# Patient Record
Sex: FEMALE | Race: BLACK OR AFRICAN AMERICAN | NOT HISPANIC OR LATINO | Employment: FULL TIME | URBAN - METROPOLITAN AREA
[De-identification: names, ages, dates, MRNs, and addresses within clinical notes are randomized per-mention and may not be internally consistent; named-entity substitution may affect disease eponyms.]

---

## 2017-04-13 ENCOUNTER — TELEPHONE (OUTPATIENT)
Dept: INTERNAL MEDICINE | Facility: CLINIC | Age: 52
End: 2017-04-13

## 2017-04-21 DIAGNOSIS — Z12.31 OTHER SCREENING MAMMOGRAM: ICD-10-CM

## 2017-06-08 ENCOUNTER — OFFICE VISIT (OUTPATIENT)
Dept: INTERNAL MEDICINE | Facility: CLINIC | Age: 52
End: 2017-06-08
Payer: COMMERCIAL

## 2017-06-08 VITALS
RESPIRATION RATE: 16 BRPM | SYSTOLIC BLOOD PRESSURE: 126 MMHG | HEIGHT: 57 IN | WEIGHT: 151.25 LBS | TEMPERATURE: 98 F | DIASTOLIC BLOOD PRESSURE: 82 MMHG | BODY MASS INDEX: 32.63 KG/M2 | HEART RATE: 83 BPM

## 2017-06-08 DIAGNOSIS — N39.0 URINARY TRACT INFECTION WITHOUT HEMATURIA, SITE UNSPECIFIED: Primary | ICD-10-CM

## 2017-06-08 DIAGNOSIS — R42 DIZZINESS: ICD-10-CM

## 2017-06-08 LAB
BACTERIA #/AREA URNS AUTO: ABNORMAL /HPF
BILIRUB UR QL STRIP: NEGATIVE
CLARITY UR REFRACT.AUTO: ABNORMAL
COLOR UR AUTO: YELLOW
GLUCOSE UR QL STRIP: NEGATIVE
HGB UR QL STRIP: ABNORMAL
HYALINE CASTS UR QL AUTO: 0 /LPF
KETONES UR QL STRIP: NEGATIVE
LEUKOCYTE ESTERASE UR QL STRIP: ABNORMAL
MICROSCOPIC COMMENT: ABNORMAL
NITRITE UR QL STRIP: NEGATIVE
NON-SQ EPI CELLS #/AREA URNS AUTO: 5 /HPF
PH UR STRIP: 7 [PH] (ref 5–8)
PROT UR QL STRIP: ABNORMAL
RBC #/AREA URNS AUTO: 56 /HPF (ref 0–4)
SP GR UR STRIP: 1.02 (ref 1–1.03)
SQUAMOUS #/AREA URNS AUTO: 6 /HPF
URN SPEC COLLECT METH UR: ABNORMAL
UROBILINOGEN UR STRIP-ACNC: NEGATIVE EU/DL
WBC #/AREA URNS AUTO: >100 /HPF (ref 0–5)
WBC CLUMPS UR QL AUTO: ABNORMAL

## 2017-06-08 PROCEDURE — 87186 SC STD MICRODIL/AGAR DIL: CPT

## 2017-06-08 PROCEDURE — 87088 URINE BACTERIA CULTURE: CPT

## 2017-06-08 PROCEDURE — 99214 OFFICE O/P EST MOD 30 MIN: CPT | Mod: S$GLB,,, | Performed by: INTERNAL MEDICINE

## 2017-06-08 PROCEDURE — 99999 PR PBB SHADOW E&M-EST. PATIENT-LVL III: CPT | Mod: PBBFAC,,, | Performed by: INTERNAL MEDICINE

## 2017-06-08 PROCEDURE — 87086 URINE CULTURE/COLONY COUNT: CPT

## 2017-06-08 PROCEDURE — 81001 URINALYSIS AUTO W/SCOPE: CPT

## 2017-06-08 PROCEDURE — 87077 CULTURE AEROBIC IDENTIFY: CPT

## 2017-06-08 RX ORDER — MECLIZINE HYDROCHLORIDE 25 MG/1
25 TABLET ORAL 3 TIMES DAILY PRN
Qty: 30 TABLET | Refills: 0 | Status: SHIPPED | OUTPATIENT
Start: 2017-06-08 | End: 2020-08-06

## 2017-06-08 RX ORDER — FLUCONAZOLE 150 MG/1
150 TABLET ORAL ONCE
Qty: 2 TABLET | Refills: 0 | Status: SHIPPED | OUTPATIENT
Start: 2017-06-08 | End: 2017-06-08

## 2017-06-08 RX ORDER — PHENAZOPYRIDINE HYDROCHLORIDE 200 MG/1
200 TABLET, FILM COATED ORAL 3 TIMES DAILY PRN
Qty: 9 TABLET | Refills: 0 | Status: SHIPPED | OUTPATIENT
Start: 2017-06-08 | End: 2017-06-11

## 2017-06-08 RX ORDER — CIPROFLOXACIN 500 MG/1
500 TABLET ORAL 2 TIMES DAILY
Qty: 6 TABLET | Refills: 0 | Status: SHIPPED | OUTPATIENT
Start: 2017-06-08 | End: 2017-06-11

## 2017-06-08 NOTE — PROGRESS NOTES
Subjective:       Patient ID: Joanne Stallings is a 51 y.o. female.    Chief Complaint: Urinary Tract Infection (possible)    HPI     51-year-old female here for evaluation of possible UTI.  This started 2 days ago.  She reports that she has frequent urination and discomfort at the end of urination.  She has had UTIs in the past.  No fevers or chills.  She has not taken azo as of yet.    She gets dizzy when she changes position.  This is intermittent.    Review of Systems    Objective:      Physical Exam   Constitutional: She is oriented to person, place, and time. She appears well-developed and well-nourished.   HENT:   Head: Normocephalic and atraumatic.   Mouth/Throat: No oropharyngeal exudate.   Eyes: EOM are normal. Pupils are equal, round, and reactive to light. Right eye exhibits no discharge. Left eye exhibits no discharge. No scleral icterus.   Neck: Normal range of motion. Neck supple. No tracheal deviation present. No thyromegaly present.   Cardiovascular: Normal rate, regular rhythm and normal heart sounds.  Exam reveals no gallop and no friction rub.    No murmur heard.  Pulmonary/Chest: Effort normal and breath sounds normal. No respiratory distress. She has no wheezes. She has no rales. She exhibits no tenderness.   Abdominal: Soft. Bowel sounds are normal. She exhibits no distension and no mass. There is no tenderness. There is no rebound and no guarding.   Musculoskeletal: Normal range of motion. She exhibits no edema or tenderness.   Neurological: She is alert and oriented to person, place, and time.   Skin: Skin is warm and dry. No rash noted. No erythema. No pallor.   Psychiatric: She has a normal mood and affect. Her behavior is normal.   Vitals reviewed.      Assessment:       1. Urinary tract infection without hematuria, site unspecified    2. Dizziness        Plan:       1.  Urinalysis, urine culture.  Cipro 500 mg twice a day ×3 days.  Diflucan prescribed in case she needs this.  Pyridium  for symptoms.  2.  Meclizine prescribed as needed for dizziness.  Advised patient that this is likely to be is additional hypertension and to move a little more slowly when changing position.

## 2017-06-09 ENCOUNTER — HOSPITAL ENCOUNTER (OUTPATIENT)
Dept: RADIOLOGY | Facility: HOSPITAL | Age: 52
Discharge: HOME | End: 2017-06-09
Attending: INTERNAL MEDICINE
Payer: COMMERCIAL

## 2017-06-09 DIAGNOSIS — Z12.31 OTHER SCREENING MAMMOGRAM: ICD-10-CM

## 2017-06-09 PROCEDURE — 77067 SCR MAMMO BI INCL CAD: CPT | Mod: 26,,, | Performed by: RADIOLOGY

## 2017-06-09 PROCEDURE — 77067 SCR MAMMO BI INCL CAD: CPT | Mod: TC

## 2017-06-09 PROCEDURE — 77063 BREAST TOMOSYNTHESIS BI: CPT | Mod: 26,,, | Performed by: RADIOLOGY

## 2017-06-11 LAB
BACTERIA UR CULT: NORMAL
BACTERIA UR CULT: NORMAL

## 2017-07-21 DIAGNOSIS — E11.9 TYPE 2 DIABETES MELLITUS WITHOUT COMPLICATION: ICD-10-CM

## 2017-09-28 ENCOUNTER — OFFICE VISIT (OUTPATIENT)
Dept: INTERNAL MEDICINE | Facility: CLINIC | Age: 52
End: 2017-09-28
Payer: COMMERCIAL

## 2017-09-28 VITALS
BODY MASS INDEX: 32.86 KG/M2 | WEIGHT: 152.31 LBS | TEMPERATURE: 99 F | RESPIRATION RATE: 16 BRPM | HEART RATE: 93 BPM | DIASTOLIC BLOOD PRESSURE: 63 MMHG | HEIGHT: 57 IN | SYSTOLIC BLOOD PRESSURE: 116 MMHG

## 2017-09-28 DIAGNOSIS — J02.9 ACUTE PHARYNGITIS, UNSPECIFIED ETIOLOGY: Primary | ICD-10-CM

## 2017-09-28 DIAGNOSIS — J04.0 LARYNGITIS: ICD-10-CM

## 2017-09-28 DIAGNOSIS — H61.20 IMPACTED CERUMEN, UNSPECIFIED LATERALITY: ICD-10-CM

## 2017-09-28 DIAGNOSIS — H92.09 OTALGIA, UNSPECIFIED LATERALITY: ICD-10-CM

## 2017-09-28 LAB — DEPRECATED S PYO AG THROAT QL EIA: NEGATIVE

## 2017-09-28 PROCEDURE — 99999 PR PBB SHADOW E&M-EST. PATIENT-LVL III: CPT | Mod: PBBFAC,,, | Performed by: INTERNAL MEDICINE

## 2017-09-28 PROCEDURE — 99212 OFFICE O/P EST SF 10 MIN: CPT | Mod: 25,S$GLB,, | Performed by: INTERNAL MEDICINE

## 2017-09-28 PROCEDURE — 96372 THER/PROPH/DIAG INJ SC/IM: CPT | Mod: S$GLB,,, | Performed by: INTERNAL MEDICINE

## 2017-09-28 PROCEDURE — 87880 STREP A ASSAY W/OPTIC: CPT | Mod: PO

## 2017-09-28 PROCEDURE — 87081 CULTURE SCREEN ONLY: CPT

## 2017-09-28 PROCEDURE — 87147 CULTURE TYPE IMMUNOLOGIC: CPT

## 2017-09-28 RX ORDER — TRIAMCINOLONE ACETONIDE 40 MG/ML
40 INJECTION, SUSPENSION INTRA-ARTICULAR; INTRAMUSCULAR
Status: COMPLETED | OUTPATIENT
Start: 2017-09-28 | End: 2017-09-28

## 2017-09-28 RX ORDER — AMOXICILLIN AND CLAVULANATE POTASSIUM 875; 125 MG/1; MG/1
1 TABLET, FILM COATED ORAL 2 TIMES DAILY
Qty: 20 TABLET | Refills: 0 | Status: SHIPPED | OUTPATIENT
Start: 2017-09-28 | End: 2017-10-08

## 2017-09-28 RX ADMIN — TRIAMCINOLONE ACETONIDE 40 MG: 40 INJECTION, SUSPENSION INTRA-ARTICULAR; INTRAMUSCULAR at 03:09

## 2017-09-30 LAB — BACTERIA THROAT CULT: NORMAL

## 2017-10-01 ENCOUNTER — PATIENT MESSAGE (OUTPATIENT)
Dept: INTERNAL MEDICINE | Facility: CLINIC | Age: 52
End: 2017-10-01

## 2017-10-10 NOTE — PROGRESS NOTES
Subjective:       Patient ID: Joanne Stallings is a 51 y.o. female.    Chief Complaint: Cough; Sore Throat; Otalgia; and Laryngitis    HPI   The patient presents for evaluation of progressive hoarseness, sore throat and dry cough.  She also notes that her ears are clogged.    The patient has type 2 diabetes mellitus and hyperlipidemia.  Review of Systems   HENT: Positive for ear pain, postnasal drip, sore throat and voice change.    Respiratory: Positive for cough and shortness of breath. Negative for wheezing.        Objective:      Physical Exam   Constitutional: She is oriented to person, place, and time. She appears well-developed and well-nourished. No distress.   HENT:   Head: Normocephalic and atraumatic.   Pharynx is injected but without exudate. Sinuses are tender to palpation.  Impacted cerumen is noted bilaterally.   Eyes: Conjunctivae are normal. Right eye exhibits no discharge. Left eye exhibits no discharge. No scleral icterus.   Neck: Normal range of motion. Neck supple. No JVD present. No thyromegaly present.   Cardiovascular: Normal rate, regular rhythm and normal heart sounds.  Exam reveals no gallop and no friction rub.    No murmur heard.  Pulmonary/Chest: Effort normal and breath sounds normal. No respiratory distress. She has no wheezes. She has no rales.   Lymphadenopathy:     She has no cervical adenopathy.   Neurological: She is alert and oriented to person, place, and time.   Skin: Skin is warm and dry. No rash noted.   Nursing note and vitals reviewed.      Assessment:       1. Acute pharyngitis, unspecified etiology    2. Laryngitis    3. Otalgia, unspecified laterality    4. Impacted cerumen, unspecified laterality        Plan:       Joanne was seen today for cough, sore throat, otalgia and laryngitis.  Kenalog will be administered at this time.  The patient was advised to rest her voice.  A throat swab will be sent for strep testing.  Augmentin will be prescribed.  The patient is to  return to clinic as needed.    Diagnoses and all orders for this visit:    Acute pharyngitis, unspecified etiology  -     Throat Screen, Rapid    Laryngitis    Otalgia, unspecified laterality    Impacted cerumen, unspecified laterality  -     Ambulatory consult to ENT    Other orders  -     triamcinolone acetonide injection 40 mg; Inject 1 mL (40 mg total) into the muscle one time.  -     amoxicillin-clavulanate 875-125mg (AUGMENTIN) 875-125 mg per tablet; Take 1 tablet by mouth 2 (two) times daily.  -     Strep A culture, throat

## 2017-10-11 RX ORDER — PROMETHAZINE HYDROCHLORIDE AND DEXTROMETHORPHAN HYDROBROMIDE 6.25; 15 MG/5ML; MG/5ML
5 SYRUP ORAL EVERY 4 HOURS PRN
Qty: 240 ML | Refills: 0 | Status: SHIPPED | OUTPATIENT
Start: 2017-10-11 | End: 2017-10-21

## 2017-12-29 DIAGNOSIS — E11.9 TYPE 2 DIABETES MELLITUS WITHOUT COMPLICATION: ICD-10-CM

## 2018-01-23 ENCOUNTER — OFFICE VISIT (OUTPATIENT)
Dept: URGENT CARE | Facility: CLINIC | Age: 53
End: 2018-01-23
Payer: COMMERCIAL

## 2018-01-23 VITALS
HEART RATE: 78 BPM | RESPIRATION RATE: 18 BRPM | SYSTOLIC BLOOD PRESSURE: 148 MMHG | TEMPERATURE: 98 F | BODY MASS INDEX: 32.79 KG/M2 | HEIGHT: 57 IN | OXYGEN SATURATION: 99 % | WEIGHT: 152 LBS | DIASTOLIC BLOOD PRESSURE: 95 MMHG

## 2018-01-23 DIAGNOSIS — N39.0 URINARY TRACT INFECTION WITHOUT HEMATURIA, SITE UNSPECIFIED: ICD-10-CM

## 2018-01-23 DIAGNOSIS — R30.0 DYSURIA: Primary | ICD-10-CM

## 2018-01-23 LAB
BILIRUB UR QL STRIP: NEGATIVE
GLUCOSE UR QL STRIP: NEGATIVE
KETONES UR QL STRIP: NEGATIVE
LEUKOCYTE ESTERASE UR QL STRIP: POSITIVE
PH, POC UA: 6 (ref 5–8)
POC BLOOD, URINE: POSITIVE
POC NITRATES, URINE: NEGATIVE
PROT UR QL STRIP: POSITIVE
SP GR UR STRIP: 1.01 (ref 1–1.03)
UROBILINOGEN UR STRIP-ACNC: NORMAL (ref 0.1–1.1)

## 2018-01-23 PROCEDURE — 99214 OFFICE O/P EST MOD 30 MIN: CPT | Mod: 25,S$GLB,, | Performed by: FAMILY MEDICINE

## 2018-01-23 PROCEDURE — 81003 URINALYSIS AUTO W/O SCOPE: CPT | Mod: QW,S$GLB,, | Performed by: FAMILY MEDICINE

## 2018-01-23 RX ORDER — SULFAMETHOXAZOLE AND TRIMETHOPRIM 800; 160 MG/1; MG/1
1 TABLET ORAL 2 TIMES DAILY
Qty: 20 TABLET | Refills: 0 | Status: SHIPPED | OUTPATIENT
Start: 2018-01-23 | End: 2018-02-02

## 2018-01-24 NOTE — PROGRESS NOTES
"Subjective:       Patient ID: Joanne Stallings is a 52 y.o. female.    Vitals:  height is 4' 9" (1.448 m) and weight is 68.9 kg (152 lb). Her tympanic temperature is 97.9 °F (36.6 °C). Her blood pressure is 148/95 (abnormal) and her pulse is 78. Her respiration is 18 and oxygen saturation is 99%.     Chief Complaint: Dysuria    Dysuria    This is a new problem. The current episode started yesterday. The problem occurs every urination. The problem has been unchanged. Quality: tingling. The pain is at a severity of 0/10. The patient is experiencing no pain. There has been no fever. She is sexually active. Associated symptoms include frequency. Pertinent negatives include no chills, hematuria, nausea, urgency or vomiting. She has tried nothing for the symptoms. Her past medical history is significant for diabetes mellitus and recurrent UTIs.     Review of Systems   Constitution: Negative for chills and fever.   Skin: Negative for itching.   Musculoskeletal: Negative for back pain.   Gastrointestinal: Negative for abdominal pain, nausea and vomiting.   Genitourinary: Positive for dysuria and frequency. Negative for genital sores, hematuria, missed menses, non-menstrual bleeding and urgency.       Objective:      Physical Exam   Constitutional: She is oriented to person, place, and time. She appears well-developed and well-nourished. She is cooperative.  Non-toxic appearance. She does not appear ill. No distress.   HENT:   Head: Normocephalic and atraumatic.   Right Ear: Hearing, tympanic membrane, external ear and ear canal normal.   Left Ear: Hearing, tympanic membrane, external ear and ear canal normal.   Nose: Nose normal. No mucosal edema, rhinorrhea or nasal deformity. No epistaxis. Right sinus exhibits no maxillary sinus tenderness and no frontal sinus tenderness. Left sinus exhibits no maxillary sinus tenderness and no frontal sinus tenderness.   Mouth/Throat: Uvula is midline, oropharynx is clear and moist " and mucous membranes are normal. No trismus in the jaw. Normal dentition. No uvula swelling. No posterior oropharyngeal erythema.   Eyes: Conjunctivae and lids are normal. Right eye exhibits no discharge. Left eye exhibits no discharge. No scleral icterus.   Sclera clear bilat   Neck: Trachea normal, normal range of motion, full passive range of motion without pain and phonation normal. Neck supple.   Cardiovascular: Normal rate, regular rhythm, normal heart sounds, intact distal pulses and normal pulses.    Pulmonary/Chest: Effort normal and breath sounds normal. No respiratory distress.   Abdominal: Soft. Normal appearance and bowel sounds are normal. She exhibits no pulsatile midline mass. There is no rebound and no guarding.   Musculoskeletal: Normal range of motion. She exhibits no edema or deformity.   Neurological: She is alert and oriented to person, place, and time. She exhibits normal muscle tone. Coordination normal.   Skin: Skin is warm, dry and intact. She is not diaphoretic. No pallor.   Psychiatric: She has a normal mood and affect. Her speech is normal and behavior is normal. Judgment and thought content normal. Cognition and memory are normal.   Nursing note and vitals reviewed.      Assessment:       1. Dysuria    2. Urinary tract infection without hematuria, site unspecified        Plan:         Dysuria  -     POCT Urinalysis, Dipstick, Automated, W/O Scope    Urinary tract infection without hematuria, site unspecified  -     sulfamethoxazole-trimethoprim 800-160mg (BACTRIM DS) 800-160 mg Tab; Take 1 tablet by mouth 2 (two) times daily.  Dispense: 20 tablet; Refill: 0        Force fluid

## 2018-01-24 NOTE — PATIENT INSTRUCTIONS

## 2018-01-25 ENCOUNTER — TELEPHONE (OUTPATIENT)
Dept: URGENT CARE | Facility: CLINIC | Age: 53
End: 2018-01-25

## 2018-02-22 ENCOUNTER — OFFICE VISIT (OUTPATIENT)
Dept: OPTOMETRY | Facility: CLINIC | Age: 53
End: 2018-02-22
Payer: COMMERCIAL

## 2018-02-22 DIAGNOSIS — H52.13 MYOPIA WITH PRESBYOPIA OF BOTH EYES: ICD-10-CM

## 2018-02-22 DIAGNOSIS — E11.9 TYPE 2 DIABETES MELLITUS WITHOUT RETINOPATHY: Primary | ICD-10-CM

## 2018-02-22 DIAGNOSIS — H52.4 MYOPIA WITH PRESBYOPIA OF BOTH EYES: ICD-10-CM

## 2018-02-22 DIAGNOSIS — H35.411 LATTICE DEGENERATION OF PERIPHERAL RETINA, RIGHT: ICD-10-CM

## 2018-02-22 PROCEDURE — 99999 PR PBB SHADOW E&M-EST. PATIENT-LVL II: CPT | Mod: PBBFAC,,, | Performed by: OPTOMETRIST

## 2018-02-22 PROCEDURE — 92310 CONTACT LENS FITTING OU: CPT | Mod: ,,, | Performed by: OPTOMETRIST

## 2018-02-22 PROCEDURE — 92015 DETERMINE REFRACTIVE STATE: CPT | Mod: S$GLB,,, | Performed by: OPTOMETRIST

## 2018-02-22 PROCEDURE — 92014 COMPRE OPH EXAM EST PT 1/>: CPT | Mod: S$GLB,,, | Performed by: OPTOMETRIST

## 2018-02-22 NOTE — PROGRESS NOTES
HPI     Diabetic eye exam  Still has floaters Ou, no worse  Needs contact lens rx also, happy with current brand    Last edited by Troy Box, OD on 2/22/2018  3:47 PM. (History)            Assessment /Plan     For exam results, see Encounter Report.    Type 2 diabetes mellitus without retinopathy    Lattice degeneration of peripheral retina, right    Myopia with presbyopia of both eyes      1. No diabetic retinopathy, no csme. Return in 1 year for dilated eye exam.  2. Monitor condition. Atrophic hole. Patient to report any changes. RTC 1 year recheck.  3. Spec Rx given and Contact lens Rx released to pt. Daily wear only advised, with education to risks of extended wear.  Discussed lens care, compliance and solutions. RTC yearly contact lens follow up.    Different lens options discussed with patient. RTC 1 year full exam.

## 2018-03-05 ENCOUNTER — LAB VISIT (OUTPATIENT)
Dept: LAB | Facility: HOSPITAL | Age: 53
End: 2018-03-05
Attending: INTERNAL MEDICINE
Payer: COMMERCIAL

## 2018-03-05 ENCOUNTER — PATIENT MESSAGE (OUTPATIENT)
Dept: INTERNAL MEDICINE | Facility: CLINIC | Age: 53
End: 2018-03-05

## 2018-03-05 ENCOUNTER — OFFICE VISIT (OUTPATIENT)
Dept: INTERNAL MEDICINE | Facility: CLINIC | Age: 53
End: 2018-03-05
Payer: COMMERCIAL

## 2018-03-05 VITALS
DIASTOLIC BLOOD PRESSURE: 70 MMHG | HEART RATE: 80 BPM | SYSTOLIC BLOOD PRESSURE: 130 MMHG | TEMPERATURE: 99 F | HEIGHT: 57 IN | WEIGHT: 147.94 LBS | BODY MASS INDEX: 31.91 KG/M2

## 2018-03-05 DIAGNOSIS — Z00.00 ANNUAL PHYSICAL EXAM: ICD-10-CM

## 2018-03-05 DIAGNOSIS — E78.5 HYPERLIPIDEMIA, UNSPECIFIED HYPERLIPIDEMIA TYPE: ICD-10-CM

## 2018-03-05 DIAGNOSIS — Z00.00 ANNUAL PHYSICAL EXAM: Primary | ICD-10-CM

## 2018-03-05 DIAGNOSIS — M75.21 BICIPITAL TENDONITIS OF RIGHT SHOULDER: ICD-10-CM

## 2018-03-05 DIAGNOSIS — E11.9 TYPE 2 DIABETES MELLITUS WITHOUT COMPLICATION, WITHOUT LONG-TERM CURRENT USE OF INSULIN: Chronic | ICD-10-CM

## 2018-03-05 LAB
25(OH)D3+25(OH)D2 SERPL-MCNC: 6 NG/ML
ALBUMIN SERPL BCP-MCNC: 3.9 G/DL
ALP SERPL-CCNC: 87 U/L
ALT SERPL W/O P-5'-P-CCNC: 17 U/L
ANION GAP SERPL CALC-SCNC: 8 MMOL/L
AST SERPL-CCNC: 19 U/L
BASOPHILS # BLD AUTO: 0.03 K/UL
BASOPHILS NFR BLD: 0.6 %
BILIRUB SERPL-MCNC: 0.5 MG/DL
BUN SERPL-MCNC: 12 MG/DL
CALCIUM SERPL-MCNC: 10 MG/DL
CHLORIDE SERPL-SCNC: 104 MMOL/L
CHOLEST SERPL-MCNC: 288 MG/DL
CHOLEST/HDLC SERPL: 5 {RATIO}
CO2 SERPL-SCNC: 28 MMOL/L
CREAT SERPL-MCNC: 0.9 MG/DL
DIFFERENTIAL METHOD: ABNORMAL
EOSINOPHIL # BLD AUTO: 0.1 K/UL
EOSINOPHIL NFR BLD: 2.4 %
ERYTHROCYTE [DISTWIDTH] IN BLOOD BY AUTOMATED COUNT: 14 %
EST. GFR  (AFRICAN AMERICAN): >60 ML/MIN/1.73 M^2
EST. GFR  (NON AFRICAN AMERICAN): >60 ML/MIN/1.73 M^2
ESTIMATED AVG GLUCOSE: 134 MG/DL
GLUCOSE SERPL-MCNC: 126 MG/DL
HBA1C MFR BLD HPLC: 6.3 %
HCT VFR BLD AUTO: 42 %
HDLC SERPL-MCNC: 58 MG/DL
HDLC SERPL: 20.1 %
HGB BLD-MCNC: 13.1 G/DL
IMM GRANULOCYTES # BLD AUTO: 0.01 K/UL
IMM GRANULOCYTES NFR BLD AUTO: 0.2 %
LDLC SERPL CALC-MCNC: 210.8 MG/DL
LYMPHOCYTES # BLD AUTO: 1.6 K/UL
LYMPHOCYTES NFR BLD: 30.7 %
MCH RBC QN AUTO: 27.6 PG
MCHC RBC AUTO-ENTMCNC: 31.2 G/DL
MCV RBC AUTO: 89 FL
MONOCYTES # BLD AUTO: 0.4 K/UL
MONOCYTES NFR BLD: 7.7 %
NEUTROPHILS # BLD AUTO: 3 K/UL
NEUTROPHILS NFR BLD: 58.4 %
NONHDLC SERPL-MCNC: 230 MG/DL
NRBC BLD-RTO: 0 /100 WBC
PLATELET # BLD AUTO: 364 K/UL
PMV BLD AUTO: 10.4 FL
POTASSIUM SERPL-SCNC: 4.3 MMOL/L
PROT SERPL-MCNC: 8.2 G/DL
RBC # BLD AUTO: 4.74 M/UL
SODIUM SERPL-SCNC: 140 MMOL/L
TRIGL SERPL-MCNC: 96 MG/DL
TSH SERPL DL<=0.005 MIU/L-ACNC: 1.19 UIU/ML
WBC # BLD AUTO: 5.05 K/UL

## 2018-03-05 PROCEDURE — 36415 COLL VENOUS BLD VENIPUNCTURE: CPT | Mod: PO

## 2018-03-05 PROCEDURE — 80061 LIPID PANEL: CPT

## 2018-03-05 PROCEDURE — 85025 COMPLETE CBC W/AUTO DIFF WBC: CPT

## 2018-03-05 PROCEDURE — 99999 PR PBB SHADOW E&M-EST. PATIENT-LVL III: CPT | Mod: PBBFAC,,, | Performed by: INTERNAL MEDICINE

## 2018-03-05 PROCEDURE — 80053 COMPREHEN METABOLIC PANEL: CPT

## 2018-03-05 PROCEDURE — 83036 HEMOGLOBIN GLYCOSYLATED A1C: CPT

## 2018-03-05 PROCEDURE — 84443 ASSAY THYROID STIM HORMONE: CPT

## 2018-03-05 PROCEDURE — 99396 PREV VISIT EST AGE 40-64: CPT | Mod: S$GLB,,, | Performed by: INTERNAL MEDICINE

## 2018-03-05 PROCEDURE — 82306 VITAMIN D 25 HYDROXY: CPT

## 2018-03-05 RX ORDER — MELOXICAM 15 MG/1
15 TABLET ORAL DAILY
Qty: 7 TABLET | Refills: 0 | Status: SHIPPED | OUTPATIENT
Start: 2018-03-05 | End: 2018-04-02 | Stop reason: ALTCHOICE

## 2018-03-05 RX ORDER — ERGOCALCIFEROL 1.25 MG/1
50000 CAPSULE ORAL WEEKLY
Qty: 12 CAPSULE | Refills: 0 | Status: SHIPPED | OUTPATIENT
Start: 2018-03-05 | End: 2018-05-25 | Stop reason: SDUPTHER

## 2018-03-05 RX ORDER — ROSUVASTATIN CALCIUM 20 MG/1
20 TABLET, COATED ORAL DAILY
Qty: 30 TABLET | Refills: 6 | Status: SHIPPED | OUTPATIENT
Start: 2018-03-05 | End: 2019-03-15 | Stop reason: SDUPTHER

## 2018-03-05 NOTE — PROGRESS NOTES
Subjective:       Patient ID: Joanne Stallings is a 52 y.o. female.    Chief Complaint: Annual Exam    Patient is a 52 y.o.female who presents today for annual.      Cholesterol: due now  Vaccines: Influenza (yearly); Tetanus (due next year)   Eye exam: up to date  Mammogram: 2017  Gyn exam: hysterectomy; done in ; cervix is the only thing remaining  Colonoscopy: 2016; due in ten years    Sugars: 122 last Friday fasting; hasn't taken metformin at least for one year      Back in  she noticed it. She had her bag on the left arm and she reached for the door in her right arm and had a sharp shooting pain in her right arm. It felt numb. It passed and she was able to open the door.   Now, if she reaches behind her, she gets the pain again. It is still sharp in nature. No pain at rest. The pain is located in the tricep muscle.   Past Medical History:   Diagnosis Date    Chronic constipation     Depression     Diabetes mellitus     Hyperlipidemia      Past Surgical History:   Procedure Laterality Date     SECTION      COLONOSCOPY N/A 2016    Procedure: COLONOSCOPY;  Surgeon: Angel Sawyer MD;  Location: [facility] ([address] [place] [address]);  Service: Endoscopy;  Laterality: N/A;    HYSTERECTOMY      ALLIE, ovarires and cervix  remain (fibroids)     Social History     Social History    Marital status: Single     Spouse name: N/A    Number of children: N/A    Years of education: N/A     Occupational History    Not on file.     Social History Main Topics    Smoking status: Never Smoker    Smokeless tobacco: Never Used    Alcohol use Yes      Comment: occasional use only/ not weekly    Drug use: No    Sexual activity: Yes     Partners: Male     Birth control/ protection: Surgical     Other Topics Concern    Not on file     Social History Narrative    Work:      Family: Patient is  2 children.     Habits: Nonsmoker, occasional etoh    Diet/Exercise:     Other:             Review of patient's  allergies indicates:   Allergen Reactions    Iodine Swelling     Other reaction(s): Hives    Shellfish containing products Swelling     Other reaction(s): Hives     Ms. Stallings had no medications administered during this visit.    Review of Systems   Constitutional: Negative for appetite change, chills, diaphoresis, fatigue and fever.   HENT: Negative for congestion, dental problem, ear discharge, ear pain, hearing loss, postnasal drip, sinus pressure and sore throat.    Eyes: Negative for discharge, redness and itching.   Respiratory: Negative for cough, chest tightness, shortness of breath and wheezing.    Cardiovascular: Negative for chest pain, palpitations and leg swelling.   Gastrointestinal: Negative for abdominal pain, constipation, diarrhea, nausea and vomiting.   Endocrine: Negative for cold intolerance and heat intolerance.   Genitourinary: Negative for difficulty urinating, frequency, hematuria and urgency.   Musculoskeletal: Negative for arthralgias, back pain, gait problem, myalgias and neck pain.   Skin: Negative for color change and rash.   Neurological: Negative for dizziness, syncope and headaches.   Hematological: Negative for adenopathy.   Psychiatric/Behavioral: Negative for behavioral problems and sleep disturbance. The patient is not nervous/anxious.        Objective:      Physical Exam   Constitutional: She is oriented to person, place, and time. She appears well-developed and well-nourished. No distress.   HENT:   Head: Normocephalic and atraumatic.   Right Ear: External ear normal.   Left Ear: External ear normal.   Nose: Nose normal.   Mouth/Throat: Oropharynx is clear and moist. No oropharyngeal exudate.   Eyes: Conjunctivae and EOM are normal. Pupils are equal, round, and reactive to light. Right eye exhibits no discharge. Left eye exhibits no discharge. No scleral icterus.   Neck: Normal range of motion. Neck supple. No JVD present. No thyromegaly present.   Cardiovascular: Normal  rate, regular rhythm, normal heart sounds and intact distal pulses.  Exam reveals no gallop and no friction rub.    No murmur heard.  Pulmonary/Chest: Effort normal and breath sounds normal. No stridor. No respiratory distress. She has no wheezes. She has no rales. She exhibits no tenderness.   Abdominal: Soft. Bowel sounds are normal. She exhibits no distension. There is no tenderness. There is no rebound.   Musculoskeletal: Normal range of motion. She exhibits no edema or tenderness.   Lymphadenopathy:     She has no cervical adenopathy.   Neurological: She is alert and oriented to person, place, and time. No cranial nerve deficit.   Skin: Skin is warm and dry. No rash noted. She is not diaphoretic. No erythema.   Psychiatric: She has a normal mood and affect. Her behavior is normal.   Nursing note and vitals reviewed.      Assessment and Plan:       1. Annual physical exam  - Hemoglobin A1c; Future  - CBC auto differential; Future  - Comprehensive metabolic panel; Future  - Lipid panel; Future  - TSH; Future  - Urinalysis; Future  - Vitamin D; Future  - Microalbumin/creatinine urine ratio; Future    2. Hyperlipidemia, unspecified hyperlipidemia type  - Hemoglobin A1c; Future  - CBC auto differential; Future  - Comprehensive metabolic panel; Future  - Lipid panel; Future  - TSH; Future  - Urinalysis; Future  - Vitamin D; Future  - Microalbumin/creatinine urine ratio; Future    3. Type 2 diabetes mellitus without complication, without long-term current use of insulin  - off meds  - Hemoglobin A1c; Future  - CBC auto differential; Future  - Comprehensive metabolic panel; Future  - Lipid panel; Future  - TSH; Future  - Urinalysis; Future  - Vitamin D; Future  - Microalbumin/creatinine urine ratio; Future    4. Bicipital tendonitis of right shoulder  - trial of mobic; will try medrol if glucose is well controlled          No Follow-up on file.

## 2018-04-02 ENCOUNTER — OFFICE VISIT (OUTPATIENT)
Dept: INTERNAL MEDICINE | Facility: CLINIC | Age: 53
End: 2018-04-02
Payer: COMMERCIAL

## 2018-04-02 VITALS
RESPIRATION RATE: 16 BRPM | BODY MASS INDEX: 32.73 KG/M2 | DIASTOLIC BLOOD PRESSURE: 79 MMHG | SYSTOLIC BLOOD PRESSURE: 121 MMHG | WEIGHT: 151.69 LBS | TEMPERATURE: 99 F | HEIGHT: 57 IN | HEART RATE: 80 BPM

## 2018-04-02 DIAGNOSIS — M54.41 ACUTE BILATERAL LOW BACK PAIN WITH BILATERAL SCIATICA: Primary | ICD-10-CM

## 2018-04-02 DIAGNOSIS — M54.42 ACUTE BILATERAL LOW BACK PAIN WITH BILATERAL SCIATICA: Primary | ICD-10-CM

## 2018-04-02 PROCEDURE — 99213 OFFICE O/P EST LOW 20 MIN: CPT | Mod: S$GLB,,, | Performed by: INTERNAL MEDICINE

## 2018-04-02 PROCEDURE — 99999 PR PBB SHADOW E&M-EST. PATIENT-LVL III: CPT | Mod: PBBFAC,,, | Performed by: INTERNAL MEDICINE

## 2018-04-02 RX ORDER — IBUPROFEN 800 MG/1
800 TABLET ORAL EVERY 8 HOURS PRN
Qty: 90 TABLET | Refills: 1 | Status: SHIPPED | OUTPATIENT
Start: 2018-04-02 | End: 2018-04-12

## 2018-04-02 RX ORDER — OXYCODONE AND ACETAMINOPHEN 5; 325 MG/1; MG/1
1 TABLET ORAL EVERY 6 HOURS PRN
Qty: 28 TABLET | Refills: 0 | Status: SHIPPED | OUTPATIENT
Start: 2018-04-02 | End: 2018-06-25

## 2018-04-10 NOTE — PROGRESS NOTES
Subjective:       Patient ID: Joanne Stallings is a 52 y.o. female.    Chief Complaint: Back Pain    HPI   The patient presents with a 3 day history of lower back pain.  Pain radiates into both lower extremities.  She describes the pain as aching in character.  Pain is exacerbated by movement.  There is no bowel or bladder sphincter dysfunction.  He has been using Aleve to help alleviate the pain.  She feels she could use something stronger to provide better relief.  There is no history of injury.  There is no lower extremity weakness.  Review of Systems   Constitutional: Negative for activity change, chills, fever and unexpected weight change.   Respiratory: Negative for shortness of breath.    Cardiovascular: Negative for chest pain.   Gastrointestinal: Positive for constipation. Negative for abdominal pain.   Genitourinary: Negative for difficulty urinating and pelvic pain.   Musculoskeletal: Positive for back pain.   Skin: Negative for rash.   Neurological: Negative for weakness and numbness.       Objective:      Physical Exam   Constitutional: She is oriented to person, place, and time.   The patient is a well-developed middle-aged female complaining of back pain.   HENT:   Head: Normocephalic and atraumatic.   Cardiovascular: Normal rate, regular rhythm, normal heart sounds and intact distal pulses.    Abdominal: Soft. Bowel sounds are normal. She exhibits no distension. There is no tenderness.   Musculoskeletal:   No lumbar paraspinous muscle tenderness is present on palpation.  Anterior flexion to 80°; good extension is noted.  Equivocal straight leg raising test bilaterally.  Hip range of motion is intact.   Neurological: She is alert and oriented to person, place, and time. She exhibits normal muscle tone. Coordination normal.   Lower extremity strength is symmetrical on testing.   Nursing note and vitals reviewed.      Assessment:       1. Acute bilateral low back pain with bilateral sciatica         Plan:       Joanne was seen today for back pain.  Ibuprofen and Percocet will be ordered for pain.  Local heat may be used to the area while awake.  The patient is to return to clinic in 2-3 weeks for follow-up.    Diagnoses and all orders for this visit:    Acute bilateral low back pain with bilateral sciatica    Other orders  -     ibuprofen (ADVIL,MOTRIN) 800 MG tablet; Take 1 tablet (800 mg total) by mouth every 8 (eight) hours as needed for Pain.  -     oxyCODONE-acetaminophen (PERCOCET) 5-325 mg per tablet; Take 1 tablet by mouth every 6 (six) hours as needed for Pain.

## 2018-05-01 ENCOUNTER — PATIENT MESSAGE (OUTPATIENT)
Dept: INTERNAL MEDICINE | Facility: CLINIC | Age: 53
End: 2018-05-01

## 2018-05-01 DIAGNOSIS — M25.511 ACUTE PAIN OF RIGHT SHOULDER: Primary | ICD-10-CM

## 2018-05-15 ENCOUNTER — OFFICE VISIT (OUTPATIENT)
Dept: ORTHOPEDICS | Facility: CLINIC | Age: 53
End: 2018-05-15
Payer: COMMERCIAL

## 2018-05-15 ENCOUNTER — HOSPITAL ENCOUNTER (OUTPATIENT)
Dept: RADIOLOGY | Facility: HOSPITAL | Age: 53
Discharge: HOME | End: 2018-05-15
Attending: ORTHOPAEDIC SURGERY
Payer: COMMERCIAL

## 2018-05-15 VITALS — WEIGHT: 151 LBS | HEIGHT: 57 IN | BODY MASS INDEX: 32.58 KG/M2

## 2018-05-15 DIAGNOSIS — M75.41 IMPINGEMENT SYNDROME OF RIGHT SHOULDER: Primary | ICD-10-CM

## 2018-05-15 DIAGNOSIS — M75.41 IMPINGEMENT SYNDROME OF RIGHT SHOULDER: ICD-10-CM

## 2018-05-15 PROCEDURE — 99999 PR PBB SHADOW E&M-EST. PATIENT-LVL III: CPT | Mod: PBBFAC,,, | Performed by: ORTHOPAEDIC SURGERY

## 2018-05-15 PROCEDURE — 73030 X-RAY EXAM OF SHOULDER: CPT | Mod: 26,RT,, | Performed by: RADIOLOGY

## 2018-05-15 PROCEDURE — 3008F BODY MASS INDEX DOCD: CPT | Mod: CPTII,S$GLB,, | Performed by: ORTHOPAEDIC SURGERY

## 2018-05-15 PROCEDURE — 99203 OFFICE O/P NEW LOW 30 MIN: CPT | Mod: 25,S$GLB,, | Performed by: ORTHOPAEDIC SURGERY

## 2018-05-15 PROCEDURE — 73030 X-RAY EXAM OF SHOULDER: CPT | Mod: TC,FY,RT

## 2018-05-15 PROCEDURE — 20610 DRAIN/INJ JOINT/BURSA W/O US: CPT | Mod: RT,S$GLB,, | Performed by: ORTHOPAEDIC SURGERY

## 2018-05-15 RX ORDER — TRIAMCINOLONE ACETONIDE 40 MG/ML
40 INJECTION, SUSPENSION INTRA-ARTICULAR; INTRAMUSCULAR
Status: COMPLETED | OUTPATIENT
Start: 2018-05-15 | End: 2018-05-15

## 2018-05-15 RX ADMIN — TRIAMCINOLONE ACETONIDE 40 MG: 40 INJECTION, SUSPENSION INTRA-ARTICULAR; INTRAMUSCULAR at 04:05

## 2018-05-15 NOTE — PROGRESS NOTES
Subjective:      Patient ID: Joanne Stallings is a 52 y.o. female.    Chief Complaint: Pain of the Right Shoulder    HPI    The patient complains of 3 months subdeltoid pain.  No specific injury.  She has stiffness and some distal referred pain.  Denies neck pain and neurologic symptoms.  Oral medication from primary physician did not affect symptoms    Review of Systems   Constitution: Negative for fever and weight loss.   HENT: Negative for congestion.    Eyes: Negative for visual disturbance.   Cardiovascular: Negative for chest pain.   Respiratory: Negative for shortness of breath.    Hematologic/Lymphatic: Negative for bleeding problem. Does not bruise/bleed easily.   Skin: Negative for poor wound healing.   Musculoskeletal: Positive for joint pain.   Gastrointestinal: Negative for abdominal pain.   Genitourinary: Negative for dysuria.   Neurological: Negative for seizures.   Psychiatric/Behavioral: Negative for altered mental status.   Allergic/Immunologic: Negative for persistent infections.         Objective:            Ortho/SPM Exam    Right shoulder    General appearance: Alert, oriented, no acute distress.  C spine exam: no tenderness noted.  Skin: No rashes or lesions on exposed areas.  Atrophy: none noted.  Tenderness to palpation: Acromion.  AROM (deg): abduction-95, flexion-120, rotation- unrestricted, painful rotation- absent.  Rotator cuff: normal, Impingement test- positive.  Cross arm adduction test- negative.  Instability testing: negative.   Distal neuro: normal, normal 5/5 strength in all tested muscle groups.  Pulses: Positive peripheral pulses..                    Assessment:       Encounter Diagnosis   Name Primary?    Impingement syndrome of right shoulder Yes          Plan:       Joanne was seen today for pain.    Diagnoses and all orders for this visit:    Impingement syndrome of right shoulder        I explained my diagnostic impression and the reasoning behind it in detail, using  layman's terms.  Models and/or pictures were used to help in the explanation.    Injection recommended and consent given    Physiotherapy    Radiographic evaluation

## 2018-05-15 NOTE — LETTER
May 15, 2018      Samreen Casey, DO  [address] [place] [address]           [facility]  [address]  [facility] [address]  Phone: [phone]          Patient: Joanne Stallings   MR Number: [id number]   YOB: 1965   Date of Visit: 5/15/2018       Dear Dr. Samreen Casey:    Thank you for referring Joanne Stallings to me for evaluation. Attached you will find relevant portions of my assessment and plan of care.    If you have questions, please do not hesitate to call me. I look forward to following Joanne Stallings along with you.    Sincerely,    Andrew Lorenzana MD    Enclosure  CC:  No Recipients    If you would like to receive this communication electronically, please contact [email or web address] or [phone] to request more information on [facility] access.    For providers and/or their staff who would like to refer a patient to [facility], please contact us through our one-stop-shop provider referral line, Regan Gambino, at [phone].    If you feel you have received this communication in error or would no longer like to receive these types of communications, please e-mail [email or web address]

## 2018-05-15 NOTE — PROCEDURES
Procedures   After verbal informed consent, the right subacromial space was aseptically injected with 40 mg of triamcinolone and 1 cc of 1% plain Xylocaine.  The management of post injection flare with rest, ice and over-the-counter analgesics as explained.

## 2018-05-28 RX ORDER — ERGOCALCIFEROL 1.25 MG/1
50000 CAPSULE ORAL WEEKLY
Qty: 12 CAPSULE | Refills: 0 | Status: SHIPPED | OUTPATIENT
Start: 2018-05-28 | End: 2018-08-14

## 2018-05-30 ENCOUNTER — TELEPHONE (OUTPATIENT)
Dept: ORTHOPEDICS | Facility: CLINIC | Age: 53
End: 2018-05-30

## 2018-05-30 NOTE — TELEPHONE ENCOUNTER
Spoke with patient to reschedule appointment. She was scheduled with PA . He was made aware of date, time, and location. Verbalized understanding.  ----- Message from Analisa Cristobal sent at 5/30/2018  3:22 PM CDT -----  Contact: [phone]/self  Pt requesting to be seen before her Courtney 15, 2018 appointment for right shoulder pain  Please call and advise

## 2018-06-05 ENCOUNTER — OFFICE VISIT (OUTPATIENT)
Dept: ORTHOPEDICS | Facility: CLINIC | Age: 53
End: 2018-06-05
Payer: COMMERCIAL

## 2018-06-05 VITALS — WEIGHT: 151 LBS | BODY MASS INDEX: 32.58 KG/M2 | HEIGHT: 57 IN

## 2018-06-05 DIAGNOSIS — M75.41 IMPINGEMENT SYNDROME OF RIGHT SHOULDER: Primary | ICD-10-CM

## 2018-06-05 PROCEDURE — 3008F BODY MASS INDEX DOCD: CPT | Mod: CPTII,S$GLB,, | Performed by: ORTHOPAEDIC SURGERY

## 2018-06-05 PROCEDURE — 99999 PR PBB SHADOW E&M-EST. PATIENT-LVL III: CPT | Mod: PBBFAC,,, | Performed by: ORTHOPAEDIC SURGERY

## 2018-06-05 PROCEDURE — 99213 OFFICE O/P EST LOW 20 MIN: CPT | Mod: S$GLB,,, | Performed by: ORTHOPAEDIC SURGERY

## 2018-06-05 RX ORDER — DICLOFENAC SODIUM 20 MG/G
40 SOLUTION TOPICAL 2 TIMES DAILY
Qty: 1 BOTTLE | Refills: 1 | Status: SHIPPED | OUTPATIENT
Start: 2018-06-05 | End: 2020-10-13 | Stop reason: CLARIF

## 2018-06-05 NOTE — PROGRESS NOTES
Subjective:      Patient ID: Joanne Stallings is a 52 y.o. female.    Chief Complaint: Follow-up of the Right Shoulder      HPI: Joanne Stallings is here in follow up of right shoulder impingement symptoms. She was seen 3 weeks ago by Dr. Lorenzana and was treated with a corticosteroid injection of the right shoulder and physiotherapy. She is here today and reports symptoms have gotten worse since the injection and she never heard from therapy. She previously had pain with reaching behind her back. Now she has constant achy pain that is moving down her arm and into her neck. She has tried Pensaid for this which helps some.     Past Medical History:   Diagnosis Date    Chronic constipation     Depression     Diabetes mellitus     Hyperlipidemia        Current Outpatient Prescriptions:     fluticasone (FLONASE) 50 mcg/actuation nasal spray, 1 spray by Each Nare route once daily., Disp: 1 Bottle, Rfl: 0    lancets (ONETOUCH DELICA LANCETS) 33 gauge Misc, 100 lancets by Misc.(Non-Drug; Combo Route) route once daily., Disp: 100 each, Rfl: 3    lancets Misc, 1 each by Misc.(Non-Drug; Combo Route) route once daily., Disp: 50 each, Rfl: 11    meclizine (ANTIVERT) 25 mg tablet, Take 1 tablet (25 mg total) by mouth 3 (three) times daily as needed., Disp: 30 tablet, Rfl: 0    ONETOUCH ULTRA TEST Strp, TEST ONCE DAILY., Disp: 50 strip, Rfl: 2    oxyCODONE-acetaminophen (PERCOCET) 5-325 mg per tablet, Take 1 tablet by mouth every 6 (six) hours as needed for Pain., Disp: 28 tablet, Rfl: 0    rosuvastatin (CRESTOR) 20 MG tablet, Take 1 tablet (20 mg total) by mouth once daily., Disp: 30 tablet, Rfl: 6    VITAMIN D2 50,000 unit capsule, TAKE 1 CAPSULE (50,000 UNITS TOTAL) BY MOUTH ONCE A WEEK., Disp: 12 capsule, Rfl: 0    blood-glucose meter (ONE TOUCH ULTRA 2) kit, Use as instructed, Disp: 1 each, Rfl: 0    diclofenac sodium (PENNSAID) 20 mg/gram /actuation(2 %) sopm, Apply 40 mg topically 2 (two) times daily.,  "Disp: 1 Bottle, Rfl: 1  Review of patient's allergies indicates:   Allergen Reactions    Iodine Swelling     Other reaction(s): Hives    Shellfish containing products Swelling     Other reaction(s): Hives       Ht 4' 9" (1.448 m)   Wt 68.5 kg (151 lb)   BMI 32.68 kg/m²     Review of Systems   Constitution: Negative for chills and fever.   Cardiovascular: Negative for chest pain and palpitations.   Respiratory: Negative for shortness of breath and wheezing.    Skin: Negative for poor wound healing and rash.   Musculoskeletal: Positive for joint pain and joint swelling. Negative for arthritis, falls, muscle weakness and stiffness.   Gastrointestinal: Negative for nausea and vomiting.   Neurological: Negative for numbness, paresthesias, seizures and tremors.   Psychiatric/Behavioral: Negative for altered mental status.         Objective:    Ortho Exam     C spine exam: no tenderness noted.   Skin: No rashes or lesions on exposed areas.  Atrophy: none noted.  Tenderness to palpation: Acromion, deltoid, and trapezoid.   AROM (deg): abduction-95, flexion- 120,  painful rotation- present.  Rotator cuff: limited by pain, Impingement test- positive.  Supraspinatus stress test positive  Cross arm adduction test- positive.  Distal neuro: normal,  Pulses: Positive peripheral pulses..    GEN: Well developed, well nourished female. AAOX3. No acute distress.   Breathing unlabored.  Mood and affect normal.           Assessment:     Imaging: "FINDINGS:  The osseous structures appear intact without evidence of a displaced fracture or osseous destructive lesion.  No evidence of dislocation.    Laterally downsloping acromion.  No advanced degenerative change.  No calcifications to suggest tendonitis."    I personally reviewed the radiographs and agree with the above impression.        1. Impingement syndrome of right shoulder          Plan:       Orders Placed This Encounter    Ambulatory Referral to Physical/Occupational Therapy "    diclofenac sodium (PENNSAID) 20 mg/gram /actuation(2 %) sopm    I explained the nature of the problem to the patient. I explained since the injection did not work and symptoms are moving into/around the neck that this condition could possibly be coming from the neck vs the shoulder.  I think she would benefit from physiotherapy which she was previously prescribed but did not attend. I have made another order for this.   She has Ibuprofen 800 mg at home, I would like her to take this for 1 month.   She also requested PENSAID. I think this is reasonable.   Follow-up in about 2 months (around 8/5/2018).

## 2018-06-12 ENCOUNTER — CLINICAL SUPPORT (OUTPATIENT)
Dept: REHABILITATION | Facility: HOSPITAL | Age: 53
End: 2018-06-12
Payer: COMMERCIAL

## 2018-06-12 DIAGNOSIS — R53.1 WEAKNESS: ICD-10-CM

## 2018-06-12 DIAGNOSIS — M79.601 PAIN OF RIGHT UPPER EXTREMITY: ICD-10-CM

## 2018-06-12 PROCEDURE — 97110 THERAPEUTIC EXERCISES: CPT | Mod: PN

## 2018-06-12 PROCEDURE — 97165 OT EVAL LOW COMPLEX 30 MIN: CPT | Mod: PN

## 2018-06-12 NOTE — PLAN OF CARE
TIME RECORD    Date: 2018    Start Time:  300  Stop Time:  400    PROCEDURES:    TIMED  Procedure Min.   TE 10             UNTIMED  Procedure Min.   IE 50         Total Timed Minutes:  10  Total Timed Units:  1  Total Untimed Units:  1  Charges Billed/# of units:  LCE1 TE1    Visit:1 FOTO @ 5    OCCUPATIONAL THERAPY INITIAL EVALUATION & PLAN OF TREATMENT    Patient Name: Joanne Stallings  Physician Name:  Tim Abraham  Primary Diagnosis:  R shoulder impingement  Treatment Diagnosis:  Pain in limb, weakness, stiffness in joint  Onset Date:  2018  Eval Date:  2018  Certification Period:  2018 to 2018  Past Medical History:   Past Medical History:   Diagnosis Date    Chronic constipation     Depression     Diabetes mellitus     Hyperlipidemia    Precautions:  universal  Prior Therapy:  None for shoulder  Signs of Abuse: no  Medications: Joanne Stallings has a current medication list which includes the following prescription(s): blood-glucose meter, diclofenac sodium, fluticasone, lancets, lancets, meclizine, onetouch ultra test, oxycodone-acetaminophen, rosuvastatin, and vitamin d2.  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   Prior Level of Function: Independent  Social History:  Lives with family  Place of Residence (steps/adaptations):  N/A  Functional Deficits Leading to Referral/Nature of Injury:  Pt states she was reaching for door and felt a sharp pain with her laptop bag on her shoulder; increased pain with certain movements since then. Presents to clinic this date with decreased ROM, weakness and pain all of which impact her daily functioning.   Patient Therapy Goals:  To decrease pain and increase functional use RUE  Hand dominance: Right  X-Rays/Tests: No evidence of fracture or dislocation. No MRI    Subjective:  Pain:  During no work: 4/10  While working: 10/10  Sleepin/10  Location  of pain: lateral arm radiating down into arm.     Objective:  Sensation Test: experiences tingling experiences numbness with the certain painful movements; no numbness or tingling at rest    Observation/Inspection:rounded shoulders, forward head    Range of Motion:   Shoulder  Right   Left  Pain/Dysfunction with Movement    AROM PROM MMT AROM PROM MMT    flexion 95 100 3-/5 WNL WNL WNL    extension 25 NT 3-/5 WNL WNL WNL    abduction 85 90 3-/5 WNL WNL WNL    adduction 15 NT NT WNL WNL WNL    Internal rotation unable 25 3-/5 WNL WNL WNL    ER at 90° abd 40 25 NT WNL WNL WNL    ER at 0° abd 40 35 3-/5 WNL WNL WNL      ROM Comments: Pain at end range    Painful Arc: Patient demonstrates no painful arc in shoulder flexion or abduction    Special Tests:  Positive: Int. Rot. and ADD. Impingement, Neer Impingement and Empty can test     Palpation: (for pain)     Positive: Anterior Subacromial Space, Bicipital Groove and Supraspinatus Region    Limitations of Functional Status:   Self Care: requires increase time to don clothes, inability or pain with donning bra and reaching overhead  Work: NA  Leisure: driving, lifting, carrying, reaching    Test: Patient scored 45% on FOTO shoulder survey demonstrating Pt's functional ability with upper extremity.     Treatment included: OT evaluation, the following exercises (HEP) were instructed and Joanne was able to demonstrate them prior to the end of the session. HEP are as follows: see attached in media     Assessment  This 52 y.o. female referred to Outpatient Occupational Therapy with diagnosis of R shoulder impingement presents with limitations as described in problem list. Patient can benefit from Occupational Therapy services for Ultrasound, moist heat, PROM, AAROM, AROM, Theraputic exercises, joint mobs, home exercise program provied with written instructions, ice and strengthening. The following goals were discussed with the patient and she is in agreement with them as to  be addressed in the treatment plan.     History Examination Decision Making Complexity Score   Occupational Profile: Did an expanded chart review        Medical and Therapy History:     Comorbidities that may affect POC include: DM          Low   Performance Deficits   Dominant UE affected    Physical  Decreased function of Right UE, Decreased ROM, Increased pain, Decreased strength, Hypomobility, Muscular atrophy, Inability to perform work/tasks, Difficulty sleeping, Inability to perform leisure activiites and Inability to perform self care tasks    Cognitive  N/A      Psychosocial:    Pt unable to perform the following DM all of which were a part of pt's habits, roles, routines, interpersonal interactions prior to injury    Moderate Foto score:45%    Pt has several treatment options including ASTYM, IASTM, cupping, soft tissue mobs, joint mobs, therex, therapeutic activity, education, endurance training, modalities etc.      Discussed goals and Pt in agreement with goals.    Issued HEP at [facility] and pt able to perform all with minimal verbal and tactile cues provided by OT. Pt able to complete all without c/o and demonstrating good technique    Low Low     Rehab Potential: good    Goals to be met in 4 weeks: (7/12/18)  1) Initiate Hep   2) Pt will increase R shoulder AROM by 10 degrees grossly for improved performance with overhead ADL's  3) Pt will report 6/10 pain in (R)shoulder at worst  4) Pt will demonstrate increased MMT to 4-/5 grossly R shoulder  5) Patient will be able to achieve less than or equal to 25% on FOTO shoulder survey demonstrating overall improved functional ability with upper extremity.     Goals to be met by discharge:  1) Independent with HEP  2) Pt will demonstrate (R) shoulder AROM WNL grossly for [place] with ADL's  3) Pt will demonstrate (R) shoulder MMT WNL grossly for [place] with functional activities  4) Independent and pain free with ADL's and IADL's  5) Patient will be  able to achieve less than or equal to 10% on FOTO shoulder survey demonstrating overall improved functional ability with upper extremity.     Plan  Recommended Treatment Plan (2 times per week for 8 weeks): Therapeutic Exercise, Functional Activities, Patient Education, Home Exercise Program, ADL Training, Ultrasound/Phonophoresis, Electrical Stimulation/TENS/Interferential, Moist Heat/Ice, Sensory/Neuromuscular Reeducation and Manual Therapy  Other Recommendations:  RENA/ASTYM PRN    Therapist's Name: Maris JOHN   Date: 06/12/2018    I CERTIFY THE NEED FOR THESE SERVICES FURNISHED UNDER THIS PLAN OF TREATMENT AND WHILE UNDER MY CARE    Physician's comments: ________________________________________________________________________________________________________________________________________________      Physician's Name: ___________________________________

## 2018-06-18 ENCOUNTER — CLINICAL SUPPORT (OUTPATIENT)
Dept: REHABILITATION | Facility: HOSPITAL | Age: 53
End: 2018-06-18
Payer: COMMERCIAL

## 2018-06-18 DIAGNOSIS — R53.1 WEAKNESS: ICD-10-CM

## 2018-06-18 DIAGNOSIS — M79.601 PAIN OF RIGHT UPPER EXTREMITY: ICD-10-CM

## 2018-06-18 PROCEDURE — 97110 THERAPEUTIC EXERCISES: CPT | Mod: PN

## 2018-06-18 PROCEDURE — 97140 MANUAL THERAPY 1/> REGIONS: CPT | Mod: PN

## 2018-06-18 NOTE — PROGRESS NOTES
TIME RECORD     Date: 06/18/2018     Start Time: 300  Stop Time: 400     PROCEDURES:     TIMED  Procedure Min.   MT 10   TE 10   Sup TE 20 N/C                                                     UNTIMED  Procedure Min.   MHP 10   CP 10      Total Timed Minutes:  20  Total Timed Units:  1  Total Untimed Units:  -  Charges Billed/# of units:  MT1 TE1                       Visit:2 FOTO @ 5     OCCUPATIONAL THERAPY PROGRESS NOTE     Patient Name: Joanne Stallings  Physician Name:  Tim Abraham  Primary Diagnosis:  R shoulder impingement  Treatment Diagnosis:  Pain in limb, weakness, stiffness in joint  Onset Date:  February 2018  Eval Date:  06/12/2018  Certification Period:  06/12/2018 to 8/12/2018  Past Medical History:        Past Medical History:   Diagnosis Date    Chronic constipation      Depression      Diabetes mellitus      Hyperlipidemia     Precautions:  universal  Prior Therapy:  None for shoulder  Signs of Abuse: no  Medications: Joanne Stallings has a current medication list which includes the following prescription(s): blood-glucose meter, diclofenac sodium, fluticasone, lancets, lancets, meclizine, onetouch ultra test, oxycodone-acetaminophen, rosuvastatin, and vitamin d2.  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   Prior Level of Function: Independent  Social History:  Lives with family  Place of Residence (steps/adaptations):  N/A  Functional Deficits Leading to Referral/Nature of Injury:  Pt states she was reaching for door and felt a sharp pain with her laptop bag on her shoulder; increased pain with certain movements since then. Presents to clinic this date with decreased ROM, weakness and pain all of which impact her daily functioning.   Patient Therapy Goals:  To decrease pain and increase functional use RUE  Hand dominance: Right  X-Rays/Tests: No evidence of fracture or dislocation. No MRI     Subjective:  "    Pain: 7 /10  "I tried to do those ex's but they were causing me some pain."    Objective:     Pt with MHP to R shoulder x 10 minutes for pain mgmt and tissue ext. MT x 10 consisting of patient supine for L shoulder lateral telescoping, UT STM, pec lift, subscapularis MFR and stretch, PROM with endrange stretching, GHJ inferior anterior posterior glides grade I-III, gentle shoulder oscillations. Pt participates in OT therex for ROM and strengthening RUE per tx log:  Exercises Date 06/18/2018    Visit #2   PROM (R) Shoulder FLEX/ABD/IR/ER 10x   Supine dowel Flex/chest press 0  2/10   SL ABD 0  2/10   SL ER 0  2/10   Sleeper Stretch 3/30"   pulleys 3'   Wall slides 2/15   Corner pec stretch 3/30"   Tband Lats red  2/10   Tband Rows red  2/10   Tband IR/ER red  2/10         CP x 10 minutes after session  Assessment:     Pt participated well this date. She is very limited by pain and continues to have a high pain report however she was able to complete therex in a pain free ROM and with good technique. She has soft tissue tightness in pecs, biceps and upper traps and subscap however tolerates manual therapy well and with improved ROM noted after.  Pt good candidate for OT. She is still limited by decreased ROM, weakness and pain which impact her daily functioning. She would continue to benefit from skilled OT to address deficits.     Patient Education/Response:     Cont with HEP    Plans and Goals:     Cont with OT POC     Goals to be met in 4 weeks: (7/12/18)  1) Initiate Hep   2) Pt will increase R shoulder AROM by 10 degrees grossly for improved performance with overhead ADL's  3) Pt will report 6/10 pain in (R)shoulder at worst  4) Pt will demonstrate increased MMT to 4-/5 grossly R shoulder  5) Patient will be able to achieve less than or equal to 25% on FOTO shoulder survey demonstrating overall improved functional ability with upper extremity.      Goals to be met by discharge:  1) Independent with HEP  2) Pt " will demonstrate (R) shoulder AROM WNL grossly for [place] with ADL's  3) Pt will demonstrate (R) shoulder MMT WNL grossly for [place] with functional activities  4) Independent and pain free with ADL's and IADL's  5) Patient will be able to achieve less than or equal to 10% on FOTO shoulder survey demonstrating overall improved functional ability with upper extremity.

## 2018-06-25 ENCOUNTER — OFFICE VISIT (OUTPATIENT)
Dept: INTERNAL MEDICINE | Facility: CLINIC | Age: 53
End: 2018-06-25
Payer: COMMERCIAL

## 2018-06-25 ENCOUNTER — CLINICAL SUPPORT (OUTPATIENT)
Dept: REHABILITATION | Facility: HOSPITAL | Age: 53
End: 2018-06-25
Payer: COMMERCIAL

## 2018-06-25 VITALS
TEMPERATURE: 99 F | SYSTOLIC BLOOD PRESSURE: 136 MMHG | HEART RATE: 69 BPM | WEIGHT: 152.75 LBS | HEIGHT: 57 IN | DIASTOLIC BLOOD PRESSURE: 84 MMHG | BODY MASS INDEX: 32.96 KG/M2 | RESPIRATION RATE: 18 BRPM

## 2018-06-25 DIAGNOSIS — M79.601 PAIN OF RIGHT UPPER EXTREMITY: ICD-10-CM

## 2018-06-25 DIAGNOSIS — R53.1 WEAKNESS: ICD-10-CM

## 2018-06-25 DIAGNOSIS — M75.41 IMPINGEMENT SYNDROME OF SHOULDER REGION, RIGHT: Primary | ICD-10-CM

## 2018-06-25 DIAGNOSIS — M79.89 ARM SWELLING: ICD-10-CM

## 2018-06-25 PROCEDURE — 97140 MANUAL THERAPY 1/> REGIONS: CPT | Mod: PN

## 2018-06-25 PROCEDURE — 97110 THERAPEUTIC EXERCISES: CPT | Mod: PN

## 2018-06-25 PROCEDURE — 3008F BODY MASS INDEX DOCD: CPT | Mod: CPTII,S$GLB,, | Performed by: INTERNAL MEDICINE

## 2018-06-25 PROCEDURE — 99999 PR PBB SHADOW E&M-EST. PATIENT-LVL III: CPT | Mod: PBBFAC,,, | Performed by: INTERNAL MEDICINE

## 2018-06-25 PROCEDURE — 99214 OFFICE O/P EST MOD 30 MIN: CPT | Mod: S$GLB,,, | Performed by: INTERNAL MEDICINE

## 2018-06-25 RX ORDER — TRAMADOL HYDROCHLORIDE 50 MG/1
50 TABLET ORAL EVERY 6 HOURS PRN
Qty: 60 TABLET | Refills: 0 | Status: SHIPPED | OUTPATIENT
Start: 2018-06-25 | End: 2018-07-05

## 2018-06-25 NOTE — PROGRESS NOTES
Subjective:       Patient ID: Joanne Stallings is a 52 y.o. female.    Chief Complaint: Shoulder Pain (right) and Edema (right arm and hand)    HPI   Pt with right sided shoulder pain(since Feb)/impingement syndrome is here c/o recurrent pain in that shoulder which can radiate to her neck and with certain movements can radiate down her arm to the fingers. She has had 2 sessions of PT which has improved her ROM some. Pt has continued with Motrin 800 mg BID which helps slightly. Pt is also c/o left arm/hand swelling which has been persistent for the last month.   Review of Systems   Constitutional: Negative for activity change, appetite change, chills, diaphoresis, fatigue, fever and unexpected weight change.   HENT: Negative for postnasal drip, rhinorrhea, sinus pressure, sneezing, sore throat, trouble swallowing and voice change.    Respiratory: Negative for cough, shortness of breath and wheezing.    Cardiovascular: Negative for chest pain, palpitations and leg swelling.   Gastrointestinal: Negative for abdominal pain, blood in stool, constipation, diarrhea, nausea and vomiting.   Genitourinary: Negative for dysuria.   Musculoskeletal: Positive for myalgias. Negative for arthralgias.   Skin: Negative for rash and wound.   Allergic/Immunologic: Negative for environmental allergies and food allergies.   Hematological: Negative for adenopathy. Does not bruise/bleed easily.       Objective:      Physical Exam   Constitutional: She is oriented to person, place, and time. She appears well-developed and well-nourished. No distress.   HENT:   Head: Normocephalic and atraumatic.   Eyes: Conjunctivae and EOM are normal. Pupils are equal, round, and reactive to light. Right eye exhibits no discharge. Left eye exhibits no discharge. No scleral icterus.   Neck: Neck supple. No JVD present.   Cardiovascular: Normal rate, regular rhythm, normal heart sounds and intact distal pulses.    Pulmonary/Chest: Effort normal and breath  sounds normal. No respiratory distress. She has no wheezes. She has no rales.   Musculoskeletal: She exhibits no edema.        Right shoulder: She exhibits tenderness and pain. She exhibits no bony tenderness.   Lymphadenopathy:     She has no cervical adenopathy.   Neurological: She is alert and oriented to person, place, and time.   Skin: Skin is warm and dry. No rash noted. She is not diaphoretic. No pallor.       Assessment:       1. Impingement syndrome of shoulder region, right    2. Arm swelling        Plan:    1. Pt advised to continue with PT       Rx Tramadol 50 mg q6 PRN severe pain and continue NSAIDs   2. Check US for evaluation

## 2018-06-25 NOTE — PROGRESS NOTES
TIME RECORD     Date: 06/25/2018     Start Time: 300  Stop Time: 400     PROCEDURES:     TIMED  Procedure Min.   MT 10   TE 40                                                         UNTIMED  Procedure Min.   MHP 10          Total Timed Minutes:  50  Total Timed Units:  4  Total Untimed Units:  -  Charges Billed/# of units:  MT1 TE3                       Visit:2 FOTO @ 5     OCCUPATIONAL THERAPY PROGRESS NOTE     Patient Name: Joanne Stallings  Physician Name:  Tim Abraham  Primary Diagnosis:  R shoulder impingement  Treatment Diagnosis:  Pain in limb, weakness, stiffness in joint  Onset Date:  February 2018  Eval Date:  06/12/2018  Certification Period:  06/12/2018 to 8/12/2018  Past Medical History:        Past Medical History:   Diagnosis Date    Chronic constipation      Depression      Diabetes mellitus      Hyperlipidemia     Precautions:  universal  Prior Therapy:  None for shoulder  Signs of Abuse: no  Medications: Joanne Stallings has a current medication list which includes the following prescription(s): blood-glucose meter, diclofenac sodium, fluticasone, lancets, lancets, meclizine, onetouch ultra test, oxycodone-acetaminophen, rosuvastatin, and vitamin d2.  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   Prior Level of Function: Independent  Social History:  Lives with family  Place of Residence (steps/adaptations):  N/A  Functional Deficits Leading to Referral/Nature of Injury:  Pt states she was reaching for door and felt a sharp pain with her laptop bag on her shoulder; increased pain with certain movements since then. Presents to clinic this date with decreased ROM, weakness and pain all of which impact her daily functioning.   Patient Therapy Goals:  To decrease pain and increase functional use RUE  Hand dominance: Right  X-Rays/Tests: No evidence of fracture or dislocation. No MRI     Subjective:     Pain:  "4/10  "I went to the doctor today and he gave me some pain meds which help."    Objective:     Pt with MHP to R shoulder x 10 minutes for pain mgmt and tissue ext. MT x 10 consisting of patient supine for L shoulder lateral telescoping, UT STM, pec lift, subscapularis MFR and stretch, PROM with endrange stretching, GHJ inferior anterior posterior glides grade I-III, gentle shoulder oscillations. Pt participates in OT therex for ROM and strengthening RUE per tx log:  Exercises Date 06/25/2018    Visit #3   PROM (R) Shoulder FLEX/ABD/IR/ER 10x   Supine dowel Flex 0  2/15   SL ABD 0  2/15   SL ER 0  2/15   Sleeper Stretch 3/30"   pulleys 3'   Wall slides 2/15   Corner pec stretch 3/30"   Tband Lats red  2/15   Tband Rows red  2/15   Tband IR/ER red  2/15   IR pulley stretch 3/30"     Declined CP this date.   Assessment:     Pt with better participation this date. Her ROM continues to steadily improve. She was able to increase reps without c/o. Her pain report is improved significantly from previous session. She has soft tissue tightness in pecs, biceps and upper traps and subscap however responds well to manual therapy.  Pt progressing well to goals. She is still limited by decreased ROM, weakness and pain which impact her daily functioning. She would continue to benefit from skilled OT to address deficits.     Patient Education/Response:     Cont with HEP    Plans and Goals:     Cont with OT POC     Goals to be met in 4 weeks: (7/12/18)  1) Initiate Hep   2) Pt will increase R shoulder AROM by 10 degrees grossly for improved performance with overhead ADL's  3) Pt will report 6/10 pain in (R)shoulder at worst  4) Pt will demonstrate increased MMT to 4-/5 grossly R shoulder  5) Patient will be able to achieve less than or equal to 25% on FOTO shoulder survey demonstrating overall improved functional ability with upper extremity.      Goals to be met by discharge:  1) Independent with HEP  2) Pt will demonstrate (R) " shoulder AROM WNL grossly for [place] with ADL's  3) Pt will demonstrate (R) shoulder MMT WNL grossly for [place] with functional activities  4) Independent and pain free with ADL's and IADL's  5) Patient will be able to achieve less than or equal to 10% on FOTO shoulder survey demonstrating overall improved functional ability with upper extremity.

## 2018-06-26 ENCOUNTER — CLINICAL SUPPORT (OUTPATIENT)
Dept: CARDIOLOGY | Facility: CLINIC | Age: 53
End: 2018-06-26
Attending: INTERNAL MEDICINE
Payer: COMMERCIAL

## 2018-06-26 DIAGNOSIS — M79.89 ARM SWELLING: ICD-10-CM

## 2018-06-26 PROCEDURE — 93971 EXTREMITY STUDY: CPT | Mod: S$GLB,,, | Performed by: INTERNAL MEDICINE

## 2018-07-09 ENCOUNTER — TELEPHONE (OUTPATIENT)
Dept: REHABILITATION | Facility: HOSPITAL | Age: 53
End: 2018-07-09

## 2018-07-23 ENCOUNTER — CLINICAL SUPPORT (OUTPATIENT)
Dept: REHABILITATION | Facility: HOSPITAL | Age: 53
End: 2018-07-23
Payer: COMMERCIAL

## 2018-07-23 DIAGNOSIS — M79.601 PAIN OF RIGHT UPPER EXTREMITY: ICD-10-CM

## 2018-07-23 DIAGNOSIS — R53.1 WEAKNESS: ICD-10-CM

## 2018-07-23 PROCEDURE — 97110 THERAPEUTIC EXERCISES: CPT | Mod: PN

## 2018-07-23 PROCEDURE — 97140 MANUAL THERAPY 1/> REGIONS: CPT | Mod: PN

## 2018-07-23 NOTE — PROGRESS NOTES
"TIME RECORD     Date: 07/23/2018     Start Time: 300  Stop Time: 350     PROCEDURES:     TIMED  Procedure Min.   MT 10   TE 40                                                         UNTIMED  Procedure Min.              Total Timed Minutes:  50  Total Timed Units:  4  Total Untimed Units:  -  Charges Billed/# of units:  MT1 TE3                       Visit:3 FOTO @ 5     OCCUPATIONAL THERAPY PROGRESS NOTE     Patient Name: Joanne Stallings  Physician Name:  Tim Abraham  Primary Diagnosis:  R shoulder impingement  Treatment Diagnosis:  Pain in limb, weakness, stiffness in joint  Onset Date:  February 2018  Eval Date:  06/12/2018  Certification Period:  06/12/2018 to 8/12/2018  Past Medical History:        Past Medical History:   Diagnosis Date    Chronic constipation      Depression      Diabetes mellitus      Hyperlipidemia     Precautions:  universal  X-Rays/Tests: No evidence of fracture or dislocation. No MRI     Subjective:     Pain: 5/10  "I feel like the motion is improving but the pain is not."    Objective:     Pt on UBE for/rev @120rpms x 6 minutes prior to session. MT x 10 consisting of patient supine for R shoulder lateral telescoping, UT STM, pec lift, subscapularis MFR and stretch, PROM with endrange stretching, GHJ inferior anterior posterior glides grade I-III, gentle shoulder oscillations. Pt participates in OT therex for ROM and strengthening RUE per tx log:  Exercises Date 07/23/2018    Visit #4   PROM (R) Shoulder FLEX/ABD/IR/ER 10x   Supine dowel Flex 2  2/15   SL ABD 1  2/15   SL ER 1  2/15   Sleeper Stretch 3/30"   pulleys 3'   Wall slides 2/15   Corner pec stretch 3/30"   Tband Lats Green  2/15   Tband Rows Green  2/15   Tband IR/ER Green  2/15   IR pulley stretch 3/30"     Range of Motion:   Shoulder   Right     AROM PROM   flexion 135(+40) 135(+35)   extension 35(+10) NT   abduction 100(+15) 100(+10)   adduction 20(+5) NT   Internal rotation unable 50(+25)   ER at 90° abd 70(+30) " 55(+30)   ER at 0° abd 45(+5) 50(+15)      Declined CP this date.   Assessment:     Pt continues to have a high pain report however her ROM has significantly improved from IE. She was able to tolerate progression of treatment well-increasing weight and resistance with therex without c/o. She has soft tissue tightness in pecs, biceps, upper traps and subscap however responds well to manual therapy.  Pt progressing well to goals. She is still limited by decreased ROM, weakness and pain which impact her daily functioning. She would continue to benefit from skilled OT to address deficits.     Patient Education/Response:     Cont with HEP    Plans and Goals:     Cont with OT POC     Goals to be met in 4 weeks: (7/12/18)  1) Initiate Hep   2) Pt will increase R shoulder AROM by 10 degrees grossly for improved performance with overhead ADL's  3) Pt will report 6/10 pain in (R)shoulder at worst  4) Pt will demonstrate increased MMT to 4-/5 grossly R shoulder  5) Patient will be able to achieve less than or equal to 25% on FOTO shoulder survey demonstrating overall improved functional ability with upper extremity.      Goals to be met by discharge:  1) Independent with HEP  2) Pt will demonstrate (R) shoulder AROM WNL grossly for Warren with ADL's  3) Pt will demonstrate (R) shoulder MMT WNL grossly for Warren with functional activities  4) Independent and pain free with ADL's and IADL's  5) Patient will be able to achieve less than or equal to 10% on FOTO shoulder survey demonstrating overall improved functional ability with upper extremity.

## 2018-07-27 ENCOUNTER — OFFICE VISIT (OUTPATIENT)
Dept: ORTHOPEDICS | Facility: CLINIC | Age: 53
End: 2018-07-27
Payer: COMMERCIAL

## 2018-07-27 VITALS — HEIGHT: 57 IN | BODY MASS INDEX: 32.15 KG/M2 | WEIGHT: 149 LBS

## 2018-07-27 DIAGNOSIS — R20.0 NUMBNESS AND TINGLING OF RIGHT ARM: Primary | ICD-10-CM

## 2018-07-27 DIAGNOSIS — R20.2 NUMBNESS AND TINGLING OF RIGHT ARM: Primary | ICD-10-CM

## 2018-07-27 PROCEDURE — 3008F BODY MASS INDEX DOCD: CPT | Mod: CPTII,S$GLB,, | Performed by: ORTHOPAEDIC SURGERY

## 2018-07-27 PROCEDURE — 99999 PR PBB SHADOW E&M-EST. PATIENT-LVL II: CPT | Mod: PBBFAC,,, | Performed by: ORTHOPAEDIC SURGERY

## 2018-07-27 PROCEDURE — 99213 OFFICE O/P EST LOW 20 MIN: CPT | Mod: S$GLB,,, | Performed by: ORTHOPAEDIC SURGERY

## 2018-07-27 NOTE — PROGRESS NOTES
Subjective:      Patient ID: Joanne Stallings is a 52 y.o. female.    Chief Complaint: Pain of the Right Shoulder    HPI  The patient complains of persistent pain and stiffness in the right shoulder.  She has radicular pain in the arm with occasional numbness.  She denies motor symptoms. She feels physical therapy has helped with range of motion but not pain control.  Review of Systems   Constitution: Negative for fever and weight loss.   HENT: Negative for congestion.    Eyes: Negative for visual disturbance.   Cardiovascular: Negative for chest pain.   Respiratory: Negative for shortness of breath.    Hematologic/Lymphatic: Negative for bleeding problem. Does not bruise/bleed easily.   Skin: Negative for poor wound healing.   Gastrointestinal: Negative for abdominal pain.   Genitourinary: Negative for dysuria.   Neurological: Negative for seizures.   Psychiatric/Behavioral: Negative for altered mental status.   Allergic/Immunologic: Negative for persistent infections.         Objective:            Ortho/SPM Exam  Normally developed woman in no acute distress  Supple cervical spine  Right shoulder has normal skin without atrophy or winging.  No focal tenderness.  Range of motion:  Flexion-130° abduction-110° rotation is nearly full  Rotator cuff strength is normal  Impingement testing is really negative  Distal sensory motor exam is intact  Distal pulses are present  Phalen's test is negative          Assessment:       Encounter Diagnosis   Name Primary?    Numbness and tingling of right arm Yes     Mixed picture of frozen shoulder and neurologic impingement      Plan:       Joanne was seen today for pain.    Diagnoses and all orders for this visit:    Numbness and tingling of right arm        Continue therapy for shoulder  EMG to rule out nerve impingement syndromes  Follow up with Dr. Abraham to evaluate possible need for surgery

## 2018-07-31 ENCOUNTER — TELEPHONE (OUTPATIENT)
Dept: REHABILITATION | Facility: HOSPITAL | Age: 53
End: 2018-07-31

## 2018-07-31 DIAGNOSIS — M79.601 PAIN OF RIGHT UPPER EXTREMITY: ICD-10-CM

## 2018-07-31 DIAGNOSIS — R53.1 WEAKNESS: ICD-10-CM

## 2018-08-30 ENCOUNTER — PATIENT MESSAGE (OUTPATIENT)
Dept: ORTHOPEDICS | Facility: CLINIC | Age: 53
End: 2018-08-30

## 2018-09-04 ENCOUNTER — OFFICE VISIT (OUTPATIENT)
Dept: ORTHOPEDICS | Facility: CLINIC | Age: 53
End: 2018-09-04
Payer: COMMERCIAL

## 2018-09-04 VITALS — BODY MASS INDEX: 32.15 KG/M2 | HEIGHT: 57 IN | WEIGHT: 149 LBS

## 2018-09-04 DIAGNOSIS — M75.41 SHOULDER IMPINGEMENT SYNDROME, RIGHT: Primary | ICD-10-CM

## 2018-09-04 PROCEDURE — 99999 PR PBB SHADOW E&M-EST. PATIENT-LVL III: CPT | Mod: PBBFAC,,, | Performed by: ORTHOPAEDIC SURGERY

## 2018-09-04 PROCEDURE — 99214 OFFICE O/P EST MOD 30 MIN: CPT | Mod: S$GLB,,, | Performed by: ORTHOPAEDIC SURGERY

## 2018-09-04 PROCEDURE — 3008F BODY MASS INDEX DOCD: CPT | Mod: CPTII,S$GLB,, | Performed by: ORTHOPAEDIC SURGERY

## 2018-09-04 NOTE — PROGRESS NOTES
Subjective:      Patient ID: Joanne Stallings is a 52 y.o. female.    Chief Complaint: Pain of the Right Shoulder      HPI: Joanne Stallings  Is here in follow-up of right shoulder pain and numbness/tingling of the right arm. Patient reports having symptoms since February of 2018.  Symptoms originally started as numbness and tingling of the arm  and then progressed to decreased range of motion of the shoulder. She was previously seen and treated  in May with a corticosteroid injection of the right shoulder and physical therapy.  The patient reports her symptoms actually worsened after the injection however she  does report increased range of motion after physical therapy.  At her last visit an EMG was ordered but the patient did not have the test done. She continues to have daily numbness and tingling starting  In her upper arm and radiating to the dorsal hand.  The pain begins at the  Right-sided nape of the neck,  into the medial border of the scapula,  and biceps.    Past Medical History:   Diagnosis Date    Chronic constipation     Depression     Diabetes mellitus     Hyperlipidemia        Current Outpatient Medications:     fluticasone (FLONASE) 50 mcg/actuation nasal spray, 1 spray by Each Nare route once daily., Disp: 1 Bottle, Rfl: 0    lancets (ONETOUCH DELICA LANCETS) 33 gauge Misc, 100 lancets by Misc.(Non-Drug; Combo Route) route once daily., Disp: 100 each, Rfl: 3    lancets Misc, 1 each by Misc.(Non-Drug; Combo Route) route once daily., Disp: 50 each, Rfl: 11    ONETOUCH ULTRA TEST Strp, TEST ONCE DAILY., Disp: 50 strip, Rfl: 2    rosuvastatin (CRESTOR) 20 MG tablet, Take 1 tablet (20 mg total) by mouth once daily., Disp: 30 tablet, Rfl: 6    blood-glucose meter (ONE TOUCH ULTRA 2) kit, Use as instructed, Disp: 1 each, Rfl: 0    diclofenac sodium (PENNSAID) 20 mg/gram /actuation(2 %) sopm, Apply 40 mg topically 2 (two) times daily., Disp: 1 Bottle, Rfl: 1    meclizine (ANTIVERT) 25 mg  "tablet, Take 1 tablet (25 mg total) by mouth 3 (three) times daily as needed., Disp: 30 tablet, Rfl: 0  Review of patient's allergies indicates:   Allergen Reactions    Iodine Swelling     Other reaction(s): Hives    Shellfish containing products Swelling     Other reaction(s): Hives       Ht 4' 9" (1.448 m)   Wt 67.6 kg (149 lb)   LMP  (LMP Unknown)   BMI 32.24 kg/m²     Review of Systems   Constitution: Negative for chills and fever.   Cardiovascular: Negative for chest pain and palpitations.   Respiratory: Negative for shortness of breath and wheezing.    Skin: Negative for poor wound healing and rash.   Musculoskeletal: Positive for joint pain, myalgias and stiffness.   Gastrointestinal: Negative for nausea and vomiting.   Genitourinary: Negative for dysuria and hematuria.   Neurological: Positive for numbness and paresthesias. Negative for seizures and tremors.   Psychiatric/Behavioral: Negative for altered mental status.   Allergic/Immunologic: Negative for environmental allergies and persistent infections.         Objective:    Ortho Exam       GEN: Well developed, well nourished  female. AAOX3. No acute distress.   Normocephalic, atraumatic.   MAUREEN  Breathing unlabored.  Mood and affect  appropriate.     C spine exam: no tenderness noted.     Right shoulder:  Skin: No rashes or lesions on exposed areas.  Atrophy: none noted.  Tenderness to palpation: None.  AROM (deg): abduction- 120, flexion- 150, rotation-  Slightly restricted secondary to pain. painful rotation- present.  Rotator cuff: limited by pain, Impingement test- positive.  Cross arm adduction test- negative.  Instability testing:  Not performed.   Distal neuro:  No decrease in sensation today however patient reports decrease in sensation from time to time, no muscle wasting or atrophy.  Pulses: Positive peripheral pulses.    Assessment:     Imaging:   No new imaging.      1. Shoulder impingement syndrome, right      Vs frozen shoulder vs  " Neurologic impingement.      Plan:        I explained to the patient  her symptoms have a mixed picture and it is difficult to decipher exactly where her pain originates.    The majority of her symptoms sound a neurologic in nature however her exam  Is more consistent with frozen shoulder/ impingement.   I recommended repeat injection today  which the patient declined.   I have provided the patient with a number to have her EMG scheduled and completed.   Given the length of her symptoms and decreased range of motion think it is reasonable to also get an MRI of the shoulder for further evaluation.   Continue topical Pennsaid.   Continue 800 mg ibuprofen.   activity modification discussed.    Orders Placed This Encounter    MRI Shoulder Without Contrast Right     Follow-up for  EMG and MRI results.    patient make appointment on [facility].

## 2018-09-10 ENCOUNTER — HOSPITAL ENCOUNTER (OUTPATIENT)
Dept: RADIOLOGY | Facility: HOSPITAL | Age: 53
Discharge: HOME | End: 2018-09-10
Attending: ORTHOPAEDIC SURGERY
Payer: COMMERCIAL

## 2018-09-10 DIAGNOSIS — M75.41 SHOULDER IMPINGEMENT SYNDROME, RIGHT: ICD-10-CM

## 2018-09-10 PROCEDURE — 73221 MRI JOINT UPR EXTREM W/O DYE: CPT | Mod: 26,RT,, | Performed by: RADIOLOGY

## 2018-09-10 PROCEDURE — 73221 MRI JOINT UPR EXTREM W/O DYE: CPT | Mod: TC,RT

## 2018-09-17 ENCOUNTER — PATIENT MESSAGE (OUTPATIENT)
Dept: ORTHOPEDICS | Facility: CLINIC | Age: 53
End: 2018-09-17

## 2018-09-17 ENCOUNTER — TELEPHONE (OUTPATIENT)
Dept: ORTHOPEDICS | Facility: CLINIC | Age: 53
End: 2018-09-17

## 2018-09-17 DIAGNOSIS — M54.12 CERVICAL RADICULOPATHY: Primary | ICD-10-CM

## 2018-09-17 RX ORDER — GABAPENTIN 100 MG/1
100 CAPSULE ORAL 3 TIMES DAILY
Qty: 90 CAPSULE | Refills: 11 | Status: SHIPPED | OUTPATIENT
Start: 2018-09-17 | End: 2019-03-24

## 2018-09-17 NOTE — TELEPHONE ENCOUNTER
Spoke with the patient today about her right shoulder MRI results.  Explained to the patient that she does not have rotator cuff tear that requires surgery and that her symptoms are likely coming from the neck as previously discussed.     Her EMG is not scheduled until November.    I recommend trial of gabapentin in the meantime.  I have placed this order today.

## 2018-09-28 DIAGNOSIS — E11.9 TYPE 2 DIABETES MELLITUS WITHOUT COMPLICATION: ICD-10-CM

## 2018-10-08 ENCOUNTER — OFFICE VISIT (OUTPATIENT)
Dept: OBSTETRICS AND GYNECOLOGY | Facility: CLINIC | Age: 53
End: 2018-10-08
Payer: COMMERCIAL

## 2018-10-08 VITALS
HEIGHT: 57 IN | DIASTOLIC BLOOD PRESSURE: 80 MMHG | BODY MASS INDEX: 32.08 KG/M2 | WEIGHT: 148.69 LBS | SYSTOLIC BLOOD PRESSURE: 132 MMHG

## 2018-10-08 DIAGNOSIS — N89.8 VAGINAL DRYNESS: ICD-10-CM

## 2018-10-08 DIAGNOSIS — N76.1 SUBACUTE VAGINITIS: Primary | ICD-10-CM

## 2018-10-08 LAB
CANDIDA RRNA VAG QL PROBE: NEGATIVE
G VAGINALIS RRNA GENITAL QL PROBE: POSITIVE
T VAGINALIS RRNA GENITAL QL PROBE: NEGATIVE

## 2018-10-08 PROCEDURE — 99999 PR PBB SHADOW E&M-EST. PATIENT-LVL III: CPT | Mod: PBBFAC,,, | Performed by: NURSE PRACTITIONER

## 2018-10-08 PROCEDURE — 87660 TRICHOMONAS VAGIN DIR PROBE: CPT

## 2018-10-08 PROCEDURE — 99213 OFFICE O/P EST LOW 20 MIN: CPT | Mod: S$GLB,,, | Performed by: NURSE PRACTITIONER

## 2018-10-08 PROCEDURE — 3008F BODY MASS INDEX DOCD: CPT | Mod: CPTII,S$GLB,, | Performed by: NURSE PRACTITIONER

## 2018-10-08 RX ORDER — METRONIDAZOLE 7.5 MG/G
1 GEL VAGINAL DAILY
Qty: 70 G | Refills: 0 | Status: SHIPPED | OUTPATIENT
Start: 2018-10-08 | End: 2018-10-13

## 2018-10-08 RX ORDER — ESTRADIOL 0.1 MG/G
1 CREAM VAGINAL
Qty: 42.5 G | Refills: 3 | Status: SHIPPED | OUTPATIENT
Start: 2018-10-08 | End: 2019-11-26

## 2018-10-08 NOTE — PROGRESS NOTES
CC: Vaginal Irritation   Joanne Stallings is a 52 y.o. female  presents with complaint of vaginal irritation and itching  for 2 weeks.  reports odor.  She states the discharge is clear.  Alleviating factors: none. No new sexual partners. Pt reports recurrent vaginal irritation after intercourse.  Partner is not circumcised. She is s/p hysterectomy- ovaries and cervix remain. Pt also reports vaginal dryness- worse with intercourse.  She is using KY water based jelly prior to intercourse with minimal effects.       ROS:  GENERAL: No fever, chills, fatigability or weight loss.  VULVAR: No pain, no lesions and no itching.  VAGINAL: No relaxation, + itching, +  discharge, no abnormal bleeding and no lesions.  ABDOMEN: No abdominal pain. Denies nausea. Denies vomiting. No diarrhea. No constipation  BREAST: Denies pain. No lumps. No discharge.  URINARY: No incontinence, no nocturia, no frequency and no dysuria.  CARDIOVASCULAR: No chest pain. No shortness of breath. No leg cramps.  NEUROLOGICAL: No headaches. No vision changes.    PHYSICAL EXAM:  VULVA: normal appearing vulva with no masses, tenderness or lesions   VAGINA: normal appearing vagina with normal color and + thin discharge, + odor no lesions   CERVIX: surgically absent  UTERUS: uterus is normal size, shape, consistency and nontender   ADNEXA: normal adnexa in size, nontender and no masses    ASSESSMENT and PLAN:    ICD-10-CM ICD-9-CM    1. Subacute vaginitis N76.1 616.10 metroNIDAZOLE (METROGEL VAGINAL) 0.75 % vaginal gel      Vaginosis Screen by DNA Probe      boric acid (BORIC ACID) vaginal suppository   2. Vaginal dryness N89.8 625.8 estradiol (ESTRACE) 0.01 % (0.1 mg/gram) vaginal cream       Affirm  Metrogel  Boric Acid suppositories PRN after intercourse  Discussed vaginal atrophy - vaginal estrace cream bi weekly     Patient was counseled today on vaginitis prevention including :  a. avoiding feminine products such as deoderant soaps, body  wash, bubble bath, douches, scented toilet paper, deoderant tampons or pads, feminine wipes, chronic pad use, etc.  b. avoiding other vulvovaginal irritants such as long hot baths, humidity, tight, synthetic clothing, chlorine and sitting around in wet bathing suits  c. wearing cotton underwear, avoiding thong underwear and no underwear to bed  d. taking showers instead of baths and use a hair dryer on cool setting afterwards to dry  e. wearing cotton to exercise and shower immediately after exercise and change clothes  f. using polyurethane condoms without spermicide if sexually active and symptoms are triggered by intercourse    FOLLOW UP: PRN lack of improvement.    Yvonne Salazar, PACHECOP-C

## 2018-11-09 ENCOUNTER — TELEPHONE (OUTPATIENT)
Dept: NEUROLOGY | Facility: CLINIC | Age: 53
End: 2018-11-09

## 2018-11-09 NOTE — TELEPHONE ENCOUNTER
I left several messages on patient's voicemail attempting to reschedule her EMG procedure scheduled for Nov. 23rd at 10 am.  My provider will be out of the office that day and time and had some earlier dates available.  I left my name and number in order for her to return my call.

## 2018-11-12 ENCOUNTER — TELEPHONE (OUTPATIENT)
Dept: NEUROLOGY | Facility: CLINIC | Age: 53
End: 2018-11-12

## 2018-11-28 ENCOUNTER — OFFICE VISIT (OUTPATIENT)
Dept: URGENT CARE | Facility: CLINIC | Age: 53
End: 2018-11-28
Payer: COMMERCIAL

## 2018-11-28 VITALS
HEART RATE: 90 BPM | TEMPERATURE: 99 F | OXYGEN SATURATION: 100 % | DIASTOLIC BLOOD PRESSURE: 89 MMHG | WEIGHT: 148 LBS | RESPIRATION RATE: 18 BRPM | SYSTOLIC BLOOD PRESSURE: 140 MMHG | BODY MASS INDEX: 31.93 KG/M2 | HEIGHT: 57 IN

## 2018-11-28 DIAGNOSIS — J40 BRONCHITIS: Primary | ICD-10-CM

## 2018-11-28 PROCEDURE — 3008F BODY MASS INDEX DOCD: CPT | Mod: CPTII,S$GLB,, | Performed by: NURSE PRACTITIONER

## 2018-11-28 PROCEDURE — 99214 OFFICE O/P EST MOD 30 MIN: CPT | Mod: S$GLB,,, | Performed by: NURSE PRACTITIONER

## 2018-11-28 RX ORDER — PROMETHAZINE HYDROCHLORIDE AND DEXTROMETHORPHAN HYDROBROMIDE 6.25; 15 MG/5ML; MG/5ML
5 SYRUP ORAL NIGHTLY PRN
Qty: 120 ML | Refills: 0 | Status: SHIPPED | OUTPATIENT
Start: 2018-11-28 | End: 2019-03-24

## 2018-11-28 RX ORDER — METHYLPREDNISOLONE 4 MG/1
TABLET ORAL
Qty: 1 PACKAGE | Refills: 0 | Status: SHIPPED | OUTPATIENT
Start: 2018-11-28 | End: 2019-02-12 | Stop reason: ALTCHOICE

## 2018-11-28 NOTE — PROGRESS NOTES
"Subjective:       Patient ID: Joanne Stallings is a 53 y.o. female.    Vitals:  height is 4' 9" (1.448 m) and weight is 67.1 kg (148 lb). Her temperature is 98.5 °F (36.9 °C). Her blood pressure is 140/89 (abnormal) and her pulse is 90. Her respiration is 18 and oxygen saturation is 100%.     Chief Complaint: Cough    Cough   This is a new problem. Episode onset: 2 days. The problem has been unchanged. The problem occurs constantly. The cough is non-productive. Associated symptoms include chills, ear pain, headaches and a sore throat. Pertinent negatives include no eye redness, fever, hemoptysis, myalgias, rash, shortness of breath or wheezing. Nothing aggravates the symptoms. Treatments tried: teraflu. The treatment provided mild relief.       Constitution: Positive for chills. Negative for sweating, fatigue and fever.   HENT: Positive for ear pain and sore throat. Negative for congestion, sinus pain, sinus pressure and voice change.    Neck: Negative for painful lymph nodes.   Eyes: Negative for eye redness.   Respiratory: Positive for cough. Negative for chest tightness, sputum production, bloody sputum, COPD, shortness of breath, stridor, wheezing and asthma.    Gastrointestinal: Negative for nausea and vomiting.   Musculoskeletal: Negative for muscle ache.   Skin: Negative for rash.   Allergic/Immunologic: Negative for seasonal allergies and asthma.   Neurological: Positive for headaches.   Hematologic/Lymphatic: Negative for swollen lymph nodes.       Objective:      Physical Exam   Constitutional: She is oriented to person, place, and time. She appears well-developed and well-nourished. She is cooperative.  Non-toxic appearance. She does not appear ill. No distress.   HENT:   Head: Normocephalic and atraumatic.   Right Ear: Hearing, external ear and ear canal normal. A middle ear effusion is present.   Left Ear: Hearing, external ear and ear canal normal.   Nose: Nose normal. No mucosal edema, rhinorrhea " or nasal deformity. No epistaxis. Right sinus exhibits no maxillary sinus tenderness and no frontal sinus tenderness. Left sinus exhibits no maxillary sinus tenderness and no frontal sinus tenderness.   Mouth/Throat: Uvula is midline and mucous membranes are normal. No trismus in the jaw. Normal dentition. No uvula swelling. Posterior oropharyngeal erythema present.   Eyes: Conjunctivae and lids are normal. No scleral icterus.   Sclera clear bilat   Neck: Trachea normal, full passive range of motion without pain and phonation normal. Neck supple.   Cardiovascular: Normal rate, regular rhythm, normal heart sounds, intact distal pulses and normal pulses.   Pulmonary/Chest: Effort normal and breath sounds normal. No respiratory distress.   Bronchial cough noted.    Abdominal: Soft. Normal appearance and bowel sounds are normal. She exhibits no distension. There is no tenderness.   Musculoskeletal: Normal range of motion. She exhibits no edema or deformity.   Neurological: She is alert and oriented to person, place, and time. She exhibits normal muscle tone. Coordination normal.   Skin: Skin is warm, dry and intact. She is not diaphoretic. No pallor.   Psychiatric: She has a normal mood and affect. Her speech is normal and behavior is normal. Judgment and thought content normal. Cognition and memory are normal.   Nursing note and vitals reviewed.      Assessment:       1. Bronchitis        Plan:       Patient educated on checking and monitoring blood sugar while on dose of steroids.   Bronchitis  -     methylPREDNISolone (MEDROL DOSEPACK) 4 mg tablet; use as directed  Dispense: 1 Package; Refill: 0  -     promethazine-dextromethorphan (PROMETHAZINE-DM) 6.25-15 mg/5 mL Syrp; Take 5 mLs by mouth nightly as needed.  Dispense: 120 mL; Refill: 0      Patient Instructions   Patient instructed to drink hot tea with honey, use humidifier. Patient to take steroid pack.       Bronchitis, Viral (Adult)    You have a viral  bronchitis. Bronchitis is inflammation and swelling of the lining of the lungs. This is often caused by an infection. Symptoms include a dry, hacking cough that is worse at night. The cough may bring up yellow-green mucus. You may also feel short of breath or wheeze. Other symptoms may include tiredness, chest discomfort, and chills.  Bronchitis that is caused by a virus is not treated with antibiotics. Instead, medicines may be given to help relieve symptoms. Symptoms can last up to 2 weeks, although the cough may last much longer.  This illness is contagious during the first few days and is spread through the air by coughing and sneezing, or by direct contact (touching the sick person and then touching your own eyes, nose, or mouth).  Most viral illnesses resolve within 10 to 14 days with rest and simple home remedies, although they may sometimes last for several weeks.  Home care  · If symptoms are severe, rest at home for the first 2 to 3 days. When you go back to your usual activities, don't let yourself get too tired.  · Do not smoke. Also avoid being exposed to secondhand smoke.  · You may use over-the-counter medicine to control fever or pain, unless another pain medicine was prescribed. (Note: If you have chronic liver or kidney disease or have ever had a stomach ulcer or gastrointestinal bleeding, talk with your healthcare provider before using these medicines. Also talk to your provider if you are taking medicine to prevent blood clots.) Aspirin should never be given to anyone younger than 18 years of age who is ill with a viral infection or fever. It may cause severe liver or brain damage.  · Your appetite may be poor, so a light diet is fine. Avoid dehydration by drinking 6 to 8 glasses of fluids per day (such as water, soft drinks, sports drinks, juices, tea, or soup). Extra fluids will help loosen secretions in the nose and lungs.  · Over-the-counter cough, cold, and sore-throat medicines will not  shorten the length of the illness, but they may help to reduce symptoms. (Note: Do not use decongestants if you have high blood pressure.)  Follow-up care  Follow up with your healthcare provider, or as advised. If you had an X-ray or ECG (electrocardiogram), a specialist will review it. You will be notified of any new findings that may affect your care.  Note: If you are age 65 or older, or if you have a chronic lung disease or condition that affects your immune system, or you smoke, talk to your healthcare provider about having pneumococcal vaccinations and a yearly influenza vaccination (flu shot).  When to seek medical advice  Call your healthcare provider right away if any of these occur:  · Fever of 100.4°F (38°C) or higher  · Coughing up increased amounts of colored sputum  · Weakness, drowsiness, headache, facial pain, ear pain, or a stiff neck  Call 911, or get immediate medical care  Contact emergency services right away if any of these occur:  · Coughing up blood  · Worsening weakness, drowsiness, headache, or stiff neck  · Trouble breathing, wheezing, or pain with breathing  Date Last Reviewed: 9/13/2015  © 5035-6013 [employer]. [address] [place] [address]. All rights reserved. This information is not intended as a substitute for professional medical care. Always follow your healthcare professional's instructions.    Please drink plenty of fluids.  Please get plenty of rest.  Clear liquid diet as instructed for 2 - 3 days or until symptoms improve.  Please return here or go to the Emergency Department for any concerns or worsening of condition.  If you were prescribed antibiotics, please take them to completion.  If not allergic, please take over the counter Tylenol (Acetaminophen) as directed for control of pain and/or fever.  Motrin (advil) or Alleve may help a fever, but they may also worsen your Nausea and Vomiting.    Please follow up with your primary care doctor or  specialist as needed.    Alonso Phillips MD  [phone]    If you  smoke, please stop smoking.

## 2018-11-29 NOTE — PATIENT INSTRUCTIONS
Patient instructed to drink hot tea with honey, use humidifier. Patient to take steroid pack.       Bronchitis, Viral (Adult)    You have a viral bronchitis. Bronchitis is inflammation and swelling of the lining of the lungs. This is often caused by an infection. Symptoms include a dry, hacking cough that is worse at night. The cough may bring up yellow-green mucus. You may also feel short of breath or wheeze. Other symptoms may include tiredness, chest discomfort, and chills.  Bronchitis that is caused by a virus is not treated with antibiotics. Instead, medicines may be given to help relieve symptoms. Symptoms can last up to 2 weeks, although the cough may last much longer.  This illness is contagious during the first few days and is spread through the air by coughing and sneezing, or by direct contact (touching the sick person and then touching your own eyes, nose, or mouth).  Most viral illnesses resolve within 10 to 14 days with rest and simple home remedies, although they may sometimes last for several weeks.  Home care  · If symptoms are severe, rest at home for the first 2 to 3 days. When you go back to your usual activities, don't let yourself get too tired.  · Do not smoke. Also avoid being exposed to secondhand smoke.  · You may use over-the-counter medicine to control fever or pain, unless another pain medicine was prescribed. (Note: If you have chronic liver or kidney disease or have ever had a stomach ulcer or gastrointestinal bleeding, talk with your healthcare provider before using these medicines. Also talk to your provider if you are taking medicine to prevent blood clots.) Aspirin should never be given to anyone younger than 18 years of age who is ill with a viral infection or fever. It may cause severe liver or brain damage.  · Your appetite may be poor, so a light diet is fine. Avoid dehydration by drinking 6 to 8 glasses of fluids per day (such as water, soft drinks, sports drinks, juices, tea,  or soup). Extra fluids will help loosen secretions in the nose and lungs.  · Over-the-counter cough, cold, and sore-throat medicines will not shorten the length of the illness, but they may help to reduce symptoms. (Note: Do not use decongestants if you have high blood pressure.)  Follow-up care  Follow up with your healthcare provider, or as advised. If you had an X-ray or ECG (electrocardiogram), a specialist will review it. You will be notified of any new findings that may affect your care.  Note: If you are age 65 or older, or if you have a chronic lung disease or condition that affects your immune system, or you smoke, talk to your healthcare provider about having pneumococcal vaccinations and a yearly influenza vaccination (flu shot).  When to seek medical advice  Call your healthcare provider right away if any of these occur:  · Fever of 100.4°F (38°C) or higher  · Coughing up increased amounts of colored sputum  · Weakness, drowsiness, headache, facial pain, ear pain, or a stiff neck  Call 911, or get immediate medical care  Contact emergency services right away if any of these occur:  · Coughing up blood  · Worsening weakness, drowsiness, headache, or stiff neck  · Trouble breathing, wheezing, or pain with breathing  Date Last Reviewed: 9/13/2015  © 7211-8287 [employer]. [address] [place] [address], [place] [address]. All rights reserved. This information is not intended as a substitute for professional medical care. Always follow your healthcare professional's instructions.    Please drink plenty of fluids.  Please get plenty of rest.  Clear liquid diet as instructed for 2 - 3 days or until symptoms improve.  Please return here or go to the Emergency Department for any concerns or worsening of condition.  If you were prescribed antibiotics, please take them to completion.  If not allergic, please take over the counter Tylenol (Acetaminophen) as directed for control of pain and/or fever.  Motrin  (advil) or Alleve may help a fever, but they may also worsen your Nausea and Vomiting.    Please follow up with your primary care doctor or specialist as needed.    Alonso Phillips MD  [phone]    If you  smoke, please stop smoking.

## 2018-12-10 ENCOUNTER — TELEPHONE (OUTPATIENT)
Dept: INTERNAL MEDICINE | Facility: CLINIC | Age: 53
End: 2018-12-10

## 2018-12-10 DIAGNOSIS — Z00.00 ROUTINE GENERAL MEDICAL EXAMINATION AT A HEALTH CARE FACILITY: Primary | ICD-10-CM

## 2018-12-10 NOTE — TELEPHONE ENCOUNTER
----- Message from Torres Kim sent at 12/10/2018 12:14 PM CST -----  Doctor appointment and lab have been scheduled.  Please link lab orders to the lab appointment.  Date of doctor appointment:  3/7  Physical or EP:  Physical  Date of lab appointment:  3/4  Comments:

## 2019-01-07 ENCOUNTER — OFFICE VISIT (OUTPATIENT)
Dept: URGENT CARE | Facility: CLINIC | Age: 54
End: 2019-01-07
Payer: COMMERCIAL

## 2019-01-07 VITALS
TEMPERATURE: 97 F | HEIGHT: 57 IN | SYSTOLIC BLOOD PRESSURE: 131 MMHG | HEART RATE: 75 BPM | BODY MASS INDEX: 30.2 KG/M2 | OXYGEN SATURATION: 100 % | WEIGHT: 140 LBS | DIASTOLIC BLOOD PRESSURE: 82 MMHG | RESPIRATION RATE: 20 BRPM

## 2019-01-07 DIAGNOSIS — M54.9 BACK PAIN, UNSPECIFIED BACK LOCATION, UNSPECIFIED BACK PAIN LATERALITY, UNSPECIFIED CHRONICITY: Primary | ICD-10-CM

## 2019-01-07 LAB
BILIRUB UR QL STRIP: NEGATIVE
GLUCOSE UR QL STRIP: NEGATIVE
KETONES UR QL STRIP: NEGATIVE
LEUKOCYTE ESTERASE UR QL STRIP: NEGATIVE
PH, POC UA: 6
POC BLOOD, URINE: NEGATIVE
POC NITRATES, URINE: NEGATIVE
PROT UR QL STRIP: NEGATIVE
SP GR UR STRIP: 1.02 (ref 1–1.03)
UROBILINOGEN UR STRIP-ACNC: NORMAL (ref 0.1–1.1)

## 2019-01-07 PROCEDURE — 3008F BODY MASS INDEX DOCD: CPT | Mod: CPTII,S$GLB,, | Performed by: PHYSICIAN ASSISTANT

## 2019-01-07 PROCEDURE — 99214 PR OFFICE/OUTPT VISIT, EST, LEVL IV, 30-39 MIN: ICD-10-PCS | Mod: 25,S$GLB,, | Performed by: PHYSICIAN ASSISTANT

## 2019-01-07 PROCEDURE — 99214 OFFICE O/P EST MOD 30 MIN: CPT | Mod: 25,S$GLB,, | Performed by: PHYSICIAN ASSISTANT

## 2019-01-07 PROCEDURE — 81003 URINALYSIS AUTO W/O SCOPE: CPT | Mod: QW,S$GLB,, | Performed by: PHYSICIAN ASSISTANT

## 2019-01-07 PROCEDURE — 96372 PR INJECTION,THERAP/PROPH/DIAG2ST, IM OR SUBCUT: ICD-10-PCS | Mod: S$GLB,,, | Performed by: FAMILY MEDICINE

## 2019-01-07 PROCEDURE — 3008F PR BODY MASS INDEX (BMI) DOCUMENTED: ICD-10-PCS | Mod: CPTII,S$GLB,, | Performed by: PHYSICIAN ASSISTANT

## 2019-01-07 PROCEDURE — 81003 POCT URINALYSIS, DIPSTICK, AUTOMATED, W/O SCOPE: ICD-10-PCS | Mod: QW,S$GLB,, | Performed by: PHYSICIAN ASSISTANT

## 2019-01-07 PROCEDURE — 96372 THER/PROPH/DIAG INJ SC/IM: CPT | Mod: S$GLB,,, | Performed by: FAMILY MEDICINE

## 2019-01-07 RX ORDER — IBUPROFEN 800 MG/1
800 TABLET ORAL EVERY 8 HOURS PRN
Qty: 30 TABLET | Refills: 0 | Status: SHIPPED | OUTPATIENT
Start: 2019-01-07 | End: 2019-07-12

## 2019-01-07 RX ORDER — METHOCARBAMOL 500 MG/1
1000 TABLET, FILM COATED ORAL 3 TIMES DAILY
Qty: 30 TABLET | Refills: 0 | Status: SHIPPED | OUTPATIENT
Start: 2019-01-07 | End: 2019-01-12

## 2019-01-07 RX ORDER — KETOROLAC TROMETHAMINE 30 MG/ML
30 INJECTION, SOLUTION INTRAMUSCULAR; INTRAVENOUS
Status: COMPLETED | OUTPATIENT
Start: 2019-01-07 | End: 2019-01-07

## 2019-01-07 RX ADMIN — KETOROLAC TROMETHAMINE 30 MG: 30 INJECTION, SOLUTION INTRAMUSCULAR; INTRAVENOUS at 06:01

## 2019-01-07 NOTE — LETTER
January 7, 2019      [facility] [place]  [address]  [facility] [address]  Phone: [phone]  Fax: [phone]       Patient: Joanne Stallings   YOB: 1965  Date of Visit: 01/07/2019    To Whom It May Concern:    Tony Stallings  was at [facility] on 01/07/2019. She may return to work/school on 01/09/2019 with no restrictions. If you have any questions or concerns, or if I can be of further assistance, please do not hesitate to contact me.    Sincerely,        Maddie Chamorro PA-C

## 2019-01-08 NOTE — PATIENT INSTRUCTIONS
- Rest.    - Drink plenty of fluids.    - Tylenol or Ibuprofen as directed as needed for fever/pain.    - No heavy lifting.    - Low heat to areas of pain for 20 minutes at a time.  - Go to the ER for any weakness or sensation loss of the legs, or loss of bowel or bladder control.   - Follow up with your PCP or specialty clinic as directed in the next 1-2 weeks if not improved or as needed.  You can call [phone] to schedule an appointment with the appropriate provider.    - Go to the ED if your symptoms worsen.  - You must understand that you have received an Urgent Care treatment only and that you may be released before all of your medical problems are known or treated.   - You, the patient, will arrange for follow up care as instructed.   - If your condition worsens or fails to improve we recommend that you receive another evaluation at the ER immediately or contact your PCP to discuss your concerns or return here.       Back Pain (Acute or Chronic)    Back pain is one of the most common problems. The good news is that most people feel better in 1 to 2 weeks, and most of the rest in 1 to 2 months. Most people can remain active.  People experience and describe pain differently; not everyone is the same.  · The pain can be sharp, stabbing, shooting, aching, cramping or burning.  · Movement, standing, bending, lifting, sitting, or walking may worsen pain.  · It can be localized to one spot or area, or it can be more generalized.  · It can spread or radiate upwards, to the front, or go down your arms or legs (sciatica).  · It can cause muscle spasm.  Most of the time, mechanical problems with the muscles or spine cause the pain. Mechanical problems are usually caused by an injury to the muscles or ligaments. While illness can cause back pain, it is usually not caused by a serious illness. Mechanical problems include:   · Physical activity such as sports, exercise, work, or normal activity  · Overexertion,  lifting, pushing, pulling incorrectly or too aggressively  · Sudden twisting, bending, or stretching from an accident, or accidental movement  · Poor posture  · Stretching or moving wrong, without noticing pain at the time  · Poor coordination, lack of regular exercise (check with your doctor about this)  · Spinal disc disease or arthritis  · Stress  Pain can also be related to pregnancy, or illness like appendicitis, bladder or kidney infections, pelvic infections, and many other things.  Acute back pain usually gets better in 1 to 2 weeks. Back pain related to disk disease, arthritis in the spinal joints or spinal stenosis (narrowing of the spinal canal) can become chronic and last for months or years.  Unless you had a physical injury (for example, a car accident or fall) X-rays are usually not needed for the initial evaluation of back pain. If pain continues and does not respond to medical treatment, X-rays and other tests may be needed.  Home care  Try these home care recommendations:  · When in bed, try to find a position of comfort. A firm mattress is best. Try lying flat on your back with pillows under your knees. You can also try lying on your side with your knees bent up towards your chest and a pillow between your knees.  · At first, do not try to stretch out the sore spots. If there is a strain, it is not like the good soreness you get after exercising without an injury. In this case, stretching may make it worse.  · Avoid prolong sitting, long car rides, or travel. This puts more stress on the lower back than standing or walking.  · During the first 24 to 72 hours after an acute injury or flare up of chronic back pain, apply an ice pack to the painful area for 20 minutes and then remove it for 20 minutes. Do this over a period of 60 to 90 minutes or several times a day. This will reduce swelling and pain. Wrap the ice pack in a thin towel or plastic to protect your skin.  · You can start with ice, then  switch to heat. Heat (hot shower, hot bath, or heating pad) reduces pain and works well for muscle spasms. Heat can be applied to the painful area for 20 minutes then remove it for 20 minutes. Do this over a period of 60 to 90 minutes or several times a day. Do not sleep on a heating pad. It can lead to skin burns or tissue damage.  · You can alternate ice and heat therapy. Talk with your doctor about the best treatment for your back pain.  · Therapeutic massage can help relax the back muscles without stretching them.  · Be aware of safe lifting methods and do not lift anything without stretching first.  Medicines  Talk to your doctor before using medicine, especially if you have other medical problems or are taking other medicines.  · You may use over-the-counter medicine as directed on the bottle to control pain, unless another pain medicine was prescribed. If you have chronic conditions like diabetes, liver or kidney disease, stomach ulcers, or gastrointestinal bleeding, or are taking blood thinners, talk to your doctor before taking any medicine.  · Be careful if you are given a prescription medicines, narcotics, or medicine for muscle spasms. They can cause drowsiness, affect your coordination, reflexes, and judgement. Do not drive or operate heavy machinery.  Follow-up care  Follow up with your healthcare provider, or as advised.   A radiologist will review any X-rays that were taken. Your provide will notify you of any new findings that may affect your care.  Call 911  Call emergency services if any of the following occur:  · Trouble breathing  · Confusion  · Very drowsy or trouble awakening  · Fainting or loss of consciousness  · Rapid or very slow heart rate  · Loss of bowel or bladder control  When to seek medical advice  Call your healthcare provider right away if any of these occur:   · Pain becomes worse or spreads to your legs  · Weakness or numbness in one or both legs  · Numbness in the groin or  genital area  Date Last Reviewed: 7/1/2016  © 6825-1855 [employer], [employer]. [address] [place] [address], [place] [address]. All rights reserved. This information is not intended as a substitute for professional medical care. Always follow your healthcare professional's instructions.

## 2019-01-08 NOTE — PROGRESS NOTES
"Subjective:       Patient ID: Joanne Stallings is a 53 y.o. female.    Vitals:  height is 4' 9" (1.448 m) and weight is 63.5 kg (140 lb). Her oral temperature is 97.2 °F (36.2 °C). Her blood pressure is 131/82 and her pulse is 75. Her respiration is 20 and oxygen saturation is 100%.     Chief Complaint: Back Pain (Lower Back Pain)    This is a 53 y.o. female who presents today with a chief complaint of lower back since yesterday.  Pt states she moved furniture about 1 week ago.  She began to have started to have a constant back ache 3 days ago.  She has taken Motrin for the pain with some relief.  No trauma.      Back Pain   This is a new problem. The current episode started yesterday. The problem occurs constantly. The problem is unchanged. The pain is present in the lumbar spine. The quality of the pain is described as aching. The pain does not radiate. The pain is at a severity of 4/10. The pain is mild. The pain is worse during the day. The symptoms are aggravated by sitting and position. Stiffness is present in the morning. Pertinent negatives include no abdominal pain, bladder incontinence, bowel incontinence, chest pain, dysuria or numbness. Treatments tried: Motrin. The treatment provided mild relief.       Constitution: Negative for fatigue.   Neck: Negative for neck pain.   Cardiovascular: Negative for chest pain.   Respiratory: Negative for shortness of breath.    Gastrointestinal: Negative for abdominal pain and bowel incontinence.   Genitourinary: Negative for dysuria, urgency, bladder incontinence and hematuria.   Musculoskeletal: Positive for back pain. Negative for muscle cramps and history of spine disorder.   Skin: Negative for rash and erythema.   Neurological: Negative for coordination disturbances, numbness and tingling.       Objective:      Physical Exam   Constitutional: She is oriented to person, place, and time. She appears well-developed and well-nourished.   HENT:   Head: Normocephalic " and atraumatic.   Eyes: Conjunctivae are normal.   Neck: Normal range of motion. Neck supple. No thyromegaly present.   Cardiovascular: Normal rate and regular rhythm. Exam reveals no gallop and no friction rub.   No murmur heard.  Pulmonary/Chest: Effort normal and breath sounds normal. She has no wheezes. She has no rales.   Musculoskeletal:        Lumbar back: She exhibits decreased range of motion, tenderness, pain and spasm. She exhibits no bony tenderness.   Lymphadenopathy:     She has no cervical adenopathy.   Neurological: She is alert and oriented to person, place, and time. She has normal strength. No sensory deficit.   Reflex Scores:       Patellar reflexes are 2+ on the right side and 2+ on the left side.  Skin: Skin is warm and dry. No rash noted. No erythema.   Psychiatric: She has a normal mood and affect. Her behavior is normal. Judgment and thought content normal.   Nursing note and vitals reviewed.      Results for orders placed or performed in visit on 01/07/19   POCT Urinalysis, Dipstick, Automated, W/O Scope   Result Value Ref Range    POC Blood, Urine Negative Negative    POC Bilirubin, Urine Negative Negative    POC Urobilinogen, Urine Normal 0.1 - 1.1    POC Ketones, Urine Negative Negative    POC Protein, Urine Negative Negative    POC Nitrates, Urine Negative Negative    POC Glucose, Urine Negative Negative    pH, UA 6.0     POC Specific Gravity, Urine 1.020 1.003 - 1.029    POC Leukocytes, Urine Negative Negative       Assessment:       1. Back pain, unspecified back location, unspecified back pain laterality, unspecified chronicity        Plan:         Back pain, unspecified back location, unspecified back pain laterality, unspecified chronicity  -     POCT Urinalysis, Dipstick, Automated, W/O Scope  -     ketorolac injection 30 mg  -     ibuprofen (ADVIL,MOTRIN) 800 MG tablet; Take 1 tablet (800 mg total) by mouth every 8 (eight) hours as needed for Pain.  Dispense: 30 tablet; Refill:  0  -     methocarbamol (ROBAXIN) 500 MG Tab; Take 2 tablets (1,000 mg total) by mouth 3 (three) times daily. for 5 days  Dispense: 30 tablet; Refill: 0        Joanne was seen today for back pain.    Diagnoses and all orders for this visit:    Back pain, unspecified back location, unspecified back pain laterality, unspecified chronicity  -     POCT Urinalysis, Dipstick, Automated, W/O Scope  -     ketorolac injection 30 mg  -     ibuprofen (ADVIL,MOTRIN) 800 MG tablet; Take 1 tablet (800 mg total) by mouth every 8 (eight) hours as needed for Pain.  -     methocarbamol (ROBAXIN) 500 MG Tab; Take 2 tablets (1,000 mg total) by mouth 3 (three) times daily. for 5 days      Patient Instructions   - Rest.    - Drink plenty of fluids.    - Tylenol or Ibuprofen as directed as needed for fever/pain.    - No heavy lifting.    - Low heat to areas of pain for 20 minutes at a time.  - Go to the ER for any weakness or sensation loss of the legs, or loss of bowel or bladder control.   - Follow up with your PCP or specialty clinic as directed in the next 1-2 weeks if not improved or as needed.  You can call [phone] to schedule an appointment with the appropriate provider.    - Go to the ED if your symptoms worsen.  - You must understand that you have received an Urgent Care treatment only and that you may be released before all of your medical problems are known or treated.   - You, the patient, will arrange for follow up care as instructed.   - If your condition worsens or fails to improve we recommend that you receive another evaluation at the ER immediately or contact your PCP to discuss your concerns or return here.       Back Pain (Acute or Chronic)    Back pain is one of the most common problems. The good news is that most people feel better in 1 to 2 weeks, and most of the rest in 1 to 2 months. Most people can remain active.  People experience and describe pain differently; not everyone is the same.  · The pain can be  sharp, stabbing, shooting, aching, cramping or burning.  · Movement, standing, bending, lifting, sitting, or walking may worsen pain.  · It can be localized to one spot or area, or it can be more generalized.  · It can spread or radiate upwards, to the front, or go down your arms or legs (sciatica).  · It can cause muscle spasm.  Most of the time, mechanical problems with the muscles or spine cause the pain. Mechanical problems are usually caused by an injury to the muscles or ligaments. While illness can cause back pain, it is usually not caused by a serious illness. Mechanical problems include:   · Physical activity such as sports, exercise, work, or normal activity  · Overexertion, lifting, pushing, pulling incorrectly or too aggressively  · Sudden twisting, bending, or stretching from an accident, or accidental movement  · Poor posture  · Stretching or moving wrong, without noticing pain at the time  · Poor coordination, lack of regular exercise (check with your doctor about this)  · Spinal disc disease or arthritis  · Stress  Pain can also be related to pregnancy, or illness like appendicitis, bladder or kidney infections, pelvic infections, and many other things.  Acute back pain usually gets better in 1 to 2 weeks. Back pain related to disk disease, arthritis in the spinal joints or spinal stenosis (narrowing of the spinal canal) can become chronic and last for months or years.  Unless you had a physical injury (for example, a car accident or fall) X-rays are usually not needed for the initial evaluation of back pain. If pain continues and does not respond to medical treatment, X-rays and other tests may be needed.  Home care  Try these home care recommendations:  · When in bed, try to find a position of comfort. A firm mattress is best. Try lying flat on your back with pillows under your knees. You can also try lying on your side with your knees bent up towards your chest and a pillow between your  knees.  · At first, do not try to stretch out the sore spots. If there is a strain, it is not like the good soreness you get after exercising without an injury. In this case, stretching may make it worse.  · Avoid prolong sitting, long car rides, or travel. This puts more stress on the lower back than standing or walking.  · During the first 24 to 72 hours after an acute injury or flare up of chronic back pain, apply an ice pack to the painful area for 20 minutes and then remove it for 20 minutes. Do this over a period of 60 to 90 minutes or several times a day. This will reduce swelling and pain. Wrap the ice pack in a thin towel or plastic to protect your skin.  · You can start with ice, then switch to heat. Heat (hot shower, hot bath, or heating pad) reduces pain and works well for muscle spasms. Heat can be applied to the painful area for 20 minutes then remove it for 20 minutes. Do this over a period of 60 to 90 minutes or several times a day. Do not sleep on a heating pad. It can lead to skin burns or tissue damage.  · You can alternate ice and heat therapy. Talk with your doctor about the best treatment for your back pain.  · Therapeutic massage can help relax the back muscles without stretching them.  · Be aware of safe lifting methods and do not lift anything without stretching first.  Medicines  Talk to your doctor before using medicine, especially if you have other medical problems or are taking other medicines.  · You may use over-the-counter medicine as directed on the bottle to control pain, unless another pain medicine was prescribed. If you have chronic conditions like diabetes, liver or kidney disease, stomach ulcers, or gastrointestinal bleeding, or are taking blood thinners, talk to your doctor before taking any medicine.  · Be careful if you are given a prescription medicines, narcotics, or medicine for muscle spasms. They can cause drowsiness, affect your coordination, reflexes, and judgement.  Do not drive or operate heavy machinery.  Follow-up care  Follow up with your healthcare provider, or as advised.   A radiologist will review any X-rays that were taken. Your provide will notify you of any new findings that may affect your care.  Call 911  Call emergency services if any of the following occur:  · Trouble breathing  · Confusion  · Very drowsy or trouble awakening  · Fainting or loss of consciousness  · Rapid or very slow heart rate  · Loss of bowel or bladder control  When to seek medical advice  Call your healthcare provider right away if any of these occur:   · Pain becomes worse or spreads to your legs  · Weakness or numbness in one or both legs  · Numbness in the groin or genital area  Date Last Reviewed: 7/1/2016  © 4585-4679 [employer], [employer]. [address] [place] [address], [place] [address]. All rights reserved. This information is not intended as a substitute for professional medical care. Always follow your healthcare professional's instructions.

## 2019-02-12 ENCOUNTER — HOSPITAL ENCOUNTER (OUTPATIENT)
Dept: RADIOLOGY | Facility: HOSPITAL | Age: 54
Discharge: HOME | End: 2019-02-12
Attending: INTERNAL MEDICINE
Payer: COMMERCIAL

## 2019-02-12 ENCOUNTER — OFFICE VISIT (OUTPATIENT)
Dept: INTERNAL MEDICINE | Facility: CLINIC | Age: 54
End: 2019-02-12
Payer: COMMERCIAL

## 2019-02-12 VITALS
BODY MASS INDEX: 32.29 KG/M2 | HEIGHT: 57 IN | DIASTOLIC BLOOD PRESSURE: 64 MMHG | TEMPERATURE: 99 F | OXYGEN SATURATION: 98 % | SYSTOLIC BLOOD PRESSURE: 116 MMHG | WEIGHT: 149.69 LBS | HEART RATE: 73 BPM

## 2019-02-12 DIAGNOSIS — R07.9 CHEST PAIN, UNSPECIFIED TYPE: Primary | ICD-10-CM

## 2019-02-12 DIAGNOSIS — R06.02 SOB (SHORTNESS OF BREATH) ON EXERTION: ICD-10-CM

## 2019-02-12 DIAGNOSIS — R07.9 CHEST PAIN, UNSPECIFIED TYPE: ICD-10-CM

## 2019-02-12 PROCEDURE — 71046 XR CHEST PA AND LATERAL: ICD-10-PCS | Mod: 26,,, | Performed by: RADIOLOGY

## 2019-02-12 PROCEDURE — 93000 ELECTROCARDIOGRAM COMPLETE: CPT | Mod: S$GLB,,, | Performed by: INTERNAL MEDICINE

## 2019-02-12 PROCEDURE — 93000 EKG 12-LEAD: ICD-10-PCS | Mod: S$GLB,,, | Performed by: INTERNAL MEDICINE

## 2019-02-12 PROCEDURE — 99999 PR PBB SHADOW E&M-EST. PATIENT-LVL III: ICD-10-PCS | Mod: PBBFAC,,, | Performed by: INTERNAL MEDICINE

## 2019-02-12 PROCEDURE — 71046 X-RAY EXAM CHEST 2 VIEWS: CPT | Mod: 26,,, | Performed by: RADIOLOGY

## 2019-02-12 PROCEDURE — 99214 PR OFFICE/OUTPT VISIT, EST, LEVL IV, 30-39 MIN: ICD-10-PCS | Mod: S$GLB,,, | Performed by: INTERNAL MEDICINE

## 2019-02-12 PROCEDURE — 3008F PR BODY MASS INDEX (BMI) DOCUMENTED: ICD-10-PCS | Mod: CPTII,S$GLB,, | Performed by: INTERNAL MEDICINE

## 2019-02-12 PROCEDURE — 99999 PR PBB SHADOW E&M-EST. PATIENT-LVL III: CPT | Mod: PBBFAC,,, | Performed by: INTERNAL MEDICINE

## 2019-02-12 PROCEDURE — 3008F BODY MASS INDEX DOCD: CPT | Mod: CPTII,S$GLB,, | Performed by: INTERNAL MEDICINE

## 2019-02-12 PROCEDURE — 71046 X-RAY EXAM CHEST 2 VIEWS: CPT | Mod: TC,PO

## 2019-02-12 PROCEDURE — 99214 OFFICE O/P EST MOD 30 MIN: CPT | Mod: S$GLB,,, | Performed by: INTERNAL MEDICINE

## 2019-02-12 RX ORDER — INDOMETHACIN 50 MG/1
50 CAPSULE ORAL 2 TIMES DAILY WITH MEALS
Qty: 30 CAPSULE | Refills: 1 | Status: SHIPPED | OUTPATIENT
Start: 2019-02-12 | End: 2019-03-24

## 2019-02-18 ENCOUNTER — PATIENT MESSAGE (OUTPATIENT)
Dept: INTERNAL MEDICINE | Facility: CLINIC | Age: 54
End: 2019-02-18

## 2019-02-24 NOTE — PROGRESS NOTES
Subjective:       Patient ID: Joanne Stallings is a 53 y.o. female.    Chief Complaint: Chest Pain (x 3 weeks. sharp pain in the left side behind the breast.)    HPI   The patient presents with chief complaint of recurrent chest pains of 3 weeks duration.  She describes the chest pain as sharp in character.  She has been averaging 2-3 episodes per week.  The pain lasts for several seconds.  Pain is left chest.  The pain is nonradiating.  Pain is exacerbated by deep breathing or coughing.  Patient has had a recent respiratory infection manifested cough sinus postnasal drainage, and sore throat.  She denies having any chills or fever.  She denies having any shortness of breath.    Active medical conditions include type 2 diabetes mellitus hyperlipidemia depressive disorder, and menopausal symptoms.  Review of Systems   Constitutional: Negative for activity change, appetite change and unexpected weight change.   HENT: Positive for postnasal drip.    Eyes: Negative for visual disturbance.   Respiratory: Positive for cough and shortness of breath.    Cardiovascular: Positive for chest pain. Negative for palpitations and leg swelling.   Gastrointestinal: Negative for abdominal pain, blood in stool and diarrhea.   Genitourinary: Negative for dysuria, frequency, hematuria and urgency.   Neurological: Negative for weakness, numbness and headaches.   Psychiatric/Behavioral: Negative for sleep disturbance.       Objective:      Physical Exam   Constitutional: She is oriented to person, place, and time. She appears well-developed and well-nourished. No distress.   HENT:   Head: Normocephalic and atraumatic.   Eyes: Conjunctivae and EOM are normal. No scleral icterus.   Neck: Normal range of motion. Neck supple. No JVD present. No thyromegaly present.   Cardiovascular: Normal rate, regular rhythm, normal heart sounds and intact distal pulses. Exam reveals no gallop and no friction rub.   No murmur heard.  Pulmonary/Chest:  Effort normal and breath sounds normal. No respiratory distress. She has no wheezes. She has no rales. She exhibits tenderness (Left lateral chest wall tenderness is present on palpation.).   Abdominal: Soft. Bowel sounds are normal. She exhibits no mass. There is no tenderness.   Musculoskeletal: Normal range of motion. She exhibits no tenderness.   Lymphadenopathy:     She has no cervical adenopathy.   Neurological: She is alert and oriented to person, place, and time.   Skin: Skin is warm and dry. No rash noted.   Psychiatric: She has a normal mood and affect. Her behavior is normal.   Nursing note and vitals reviewed.      EKG today shows normal sinus rhythm with ventricular rate of 67.  Nonspecific ST and T-wave abnormality.  Abnormal EKG.  Assessment:       1. Chest pain, unspecified type    2. SOB (shortness of breath) on exertion        Plan:       Joanne was seen today for chest pain. The patient's symptoms are consistent musculoskeletal chest pain or costochondritis.  This was discussed at length with the patient.  Goals of therapy discussed.  Indomethacin will be prescribed.  Chest x-ray also be obtained.  The patient is to return to clinic as needed.    Diagnoses and all orders for this visit:    Chest pain, unspecified type  -     EKG 12-lead; Future  -     X-Ray Chest PA And Lateral; Future  -     EKG 12-lead    SOB (shortness of breath) on exertion  -     EKG 12-lead; Future  -     X-Ray Chest PA And Lateral; Future  -     EKG 12-lead    Other orders  -     indomethacin (INDOCIN) 50 MG capsule; Take 1 capsule (50 mg total) by mouth 2 (two) times daily with meals. For chest wall pain

## 2019-03-04 ENCOUNTER — LAB VISIT (OUTPATIENT)
Dept: LAB | Facility: HOSPITAL | Age: 54
End: 2019-03-04
Attending: INTERNAL MEDICINE
Payer: COMMERCIAL

## 2019-03-04 DIAGNOSIS — E11.9 TYPE 2 DIABETES MELLITUS WITHOUT COMPLICATION: ICD-10-CM

## 2019-03-04 DIAGNOSIS — Z00.00 ROUTINE GENERAL MEDICAL EXAMINATION AT A HEALTH CARE FACILITY: ICD-10-CM

## 2019-03-04 LAB
ALBUMIN SERPL BCP-MCNC: 3.9 G/DL
ALP SERPL-CCNC: 80 U/L
ALT SERPL W/O P-5'-P-CCNC: 11 U/L
ANION GAP SERPL CALC-SCNC: 14 MMOL/L
AST SERPL-CCNC: 17 U/L
BASOPHILS # BLD AUTO: 0.04 K/UL
BASOPHILS NFR BLD: 0.8 %
BILIRUB SERPL-MCNC: 0.4 MG/DL
BUN SERPL-MCNC: 16 MG/DL
CALCIUM SERPL-MCNC: 10.2 MG/DL
CHLORIDE SERPL-SCNC: 105 MMOL/L
CHOLEST SERPL-MCNC: 275 MG/DL
CHOLEST/HDLC SERPL: 5 {RATIO}
CO2 SERPL-SCNC: 23 MMOL/L
CREAT SERPL-MCNC: 0.9 MG/DL
DIFFERENTIAL METHOD: ABNORMAL
EOSINOPHIL # BLD AUTO: 0.1 K/UL
EOSINOPHIL NFR BLD: 2.7 %
ERYTHROCYTE [DISTWIDTH] IN BLOOD BY AUTOMATED COUNT: 13.8 %
EST. GFR  (AFRICAN AMERICAN): >60 ML/MIN/1.73 M^2
EST. GFR  (NON AFRICAN AMERICAN): >60 ML/MIN/1.73 M^2
ESTIMATED AVG GLUCOSE: 146 MG/DL
GLUCOSE SERPL-MCNC: 124 MG/DL
HBA1C MFR BLD HPLC: 6.7 %
HCT VFR BLD AUTO: 43.6 %
HDLC SERPL-MCNC: 55 MG/DL
HDLC SERPL: 20 %
HGB BLD-MCNC: 12.9 G/DL
IMM GRANULOCYTES # BLD AUTO: 0.01 K/UL
IMM GRANULOCYTES NFR BLD AUTO: 0.2 %
LDLC SERPL CALC-MCNC: 200.4 MG/DL
LYMPHOCYTES # BLD AUTO: 1.6 K/UL
LYMPHOCYTES NFR BLD: 30.9 %
MCH RBC QN AUTO: 27.6 PG
MCHC RBC AUTO-ENTMCNC: 29.6 G/DL
MCV RBC AUTO: 93 FL
MONOCYTES # BLD AUTO: 0.4 K/UL
MONOCYTES NFR BLD: 7.4 %
NEUTROPHILS # BLD AUTO: 3 K/UL
NEUTROPHILS NFR BLD: 58 %
NONHDLC SERPL-MCNC: 220 MG/DL
NRBC BLD-RTO: 0 /100 WBC
PLATELET # BLD AUTO: 348 K/UL
PMV BLD AUTO: 10.7 FL
POTASSIUM SERPL-SCNC: 4.1 MMOL/L
PROT SERPL-MCNC: 7.9 G/DL
RBC # BLD AUTO: 4.68 M/UL
SODIUM SERPL-SCNC: 142 MMOL/L
TRIGL SERPL-MCNC: 98 MG/DL
TSH SERPL DL<=0.005 MIU/L-ACNC: 1.39 UIU/ML
WBC # BLD AUTO: 5.14 K/UL

## 2019-03-04 PROCEDURE — 84443 ASSAY THYROID STIM HORMONE: CPT

## 2019-03-04 PROCEDURE — 85025 COMPLETE CBC W/AUTO DIFF WBC: CPT

## 2019-03-04 PROCEDURE — 83036 HEMOGLOBIN GLYCOSYLATED A1C: CPT

## 2019-03-04 PROCEDURE — 80061 LIPID PANEL: CPT

## 2019-03-04 PROCEDURE — 36415 COLL VENOUS BLD VENIPUNCTURE: CPT | Mod: PO

## 2019-03-04 PROCEDURE — 80053 COMPREHEN METABOLIC PANEL: CPT

## 2019-03-12 ENCOUNTER — PATIENT MESSAGE (OUTPATIENT)
Dept: INTERNAL MEDICINE | Facility: CLINIC | Age: 54
End: 2019-03-12

## 2019-03-15 ENCOUNTER — OFFICE VISIT (OUTPATIENT)
Dept: INTERNAL MEDICINE | Facility: CLINIC | Age: 54
End: 2019-03-15
Payer: COMMERCIAL

## 2019-03-15 VITALS
BODY MASS INDEX: 32.58 KG/M2 | DIASTOLIC BLOOD PRESSURE: 81 MMHG | TEMPERATURE: 98 F | OXYGEN SATURATION: 98 % | HEIGHT: 57 IN | SYSTOLIC BLOOD PRESSURE: 140 MMHG | HEART RATE: 90 BPM | WEIGHT: 151 LBS

## 2019-03-15 DIAGNOSIS — R03.0 BLOOD PRESSURE ELEVATED WITHOUT HISTORY OF HTN: ICD-10-CM

## 2019-03-15 DIAGNOSIS — E11.9 TYPE 2 DIABETES MELLITUS WITHOUT COMPLICATION, WITHOUT LONG-TERM CURRENT USE OF INSULIN: Chronic | ICD-10-CM

## 2019-03-15 DIAGNOSIS — Z00.00 WELL ADULT EXAM: Primary | ICD-10-CM

## 2019-03-15 DIAGNOSIS — E78.5 HYPERLIPIDEMIA, UNSPECIFIED HYPERLIPIDEMIA TYPE: ICD-10-CM

## 2019-03-15 PROCEDURE — 90471 FLU VACCINE (QUAD) GREATER THAN OR EQUAL TO 3YO PRESERVATIVE FREE IM: ICD-10-PCS | Mod: S$GLB,,, | Performed by: INTERNAL MEDICINE

## 2019-03-15 PROCEDURE — 90686 FLU VACCINE (QUAD) GREATER THAN OR EQUAL TO 3YO PRESERVATIVE FREE IM: ICD-10-PCS | Mod: S$GLB,,, | Performed by: INTERNAL MEDICINE

## 2019-03-15 PROCEDURE — 99999 PR PBB SHADOW E&M-EST. PATIENT-LVL IV: ICD-10-PCS | Mod: PBBFAC,,, | Performed by: INTERNAL MEDICINE

## 2019-03-15 PROCEDURE — 99396 PR PREVENTIVE VISIT,EST,40-64: ICD-10-PCS | Mod: 25,S$GLB,, | Performed by: INTERNAL MEDICINE

## 2019-03-15 PROCEDURE — 90686 IIV4 VACC NO PRSV 0.5 ML IM: CPT | Mod: S$GLB,,, | Performed by: INTERNAL MEDICINE

## 2019-03-15 PROCEDURE — 99999 PR PBB SHADOW E&M-EST. PATIENT-LVL IV: CPT | Mod: PBBFAC,,, | Performed by: INTERNAL MEDICINE

## 2019-03-15 PROCEDURE — 3044F PR MOST RECENT HEMOGLOBIN A1C LEVEL <7.0%: ICD-10-PCS | Mod: CPTII,S$GLB,, | Performed by: INTERNAL MEDICINE

## 2019-03-15 PROCEDURE — 3044F HG A1C LEVEL LT 7.0%: CPT | Mod: CPTII,S$GLB,, | Performed by: INTERNAL MEDICINE

## 2019-03-15 PROCEDURE — 99396 PREV VISIT EST AGE 40-64: CPT | Mod: 25,S$GLB,, | Performed by: INTERNAL MEDICINE

## 2019-03-15 PROCEDURE — 90471 IMMUNIZATION ADMIN: CPT | Mod: S$GLB,,, | Performed by: INTERNAL MEDICINE

## 2019-03-15 RX ORDER — ROSUVASTATIN CALCIUM 20 MG/1
20 TABLET, COATED ORAL DAILY
Qty: 90 TABLET | Refills: 3 | Status: SHIPPED | OUTPATIENT
Start: 2019-03-15 | End: 2020-08-06

## 2019-03-15 RX ORDER — METFORMIN HYDROCHLORIDE 500 MG/1
500 TABLET ORAL
Qty: 90 TABLET | Refills: 3 | Status: SHIPPED | OUTPATIENT
Start: 2019-03-15 | End: 2020-08-06

## 2019-03-15 NOTE — PROGRESS NOTES
Subjective:       Patient ID: Joanne Stallings is a 53 y.o. female.    Chief Complaint: Annual Exam    HPI   The patient presents for annual physical examination.  Active medical conditions include diabetes mellitus and hyperlipidemia.  The patient states she has been feeling better since her last office visit.  She was diagnosed to have costochondritis.  Her chest pain has resolved.  She does not exercise regularly.  She is resting well at night.  She does not monitor blood sugar levels.  Dietary compliance is fair.    Last eye examination was on 02/22/2018.  Her last colonoscopy was on 02/12/2016.  A follow-up screening examination in 10 years was recommended.    Review of Systems   Constitutional: Negative for activity change, appetite change and unexpected weight change.   HENT: Negative for hearing loss, rhinorrhea and trouble swallowing.    Eyes: Negative for discharge and visual disturbance.   Respiratory: Negative for chest tightness, shortness of breath and wheezing.    Cardiovascular: Negative for chest pain, palpitations and leg swelling.   Gastrointestinal: Negative for abdominal pain, blood in stool, constipation, diarrhea and vomiting.   Endocrine: Negative for polydipsia and polyuria.   Genitourinary: Negative for difficulty urinating, dysuria, frequency, hematuria, menstrual problem and urgency.   Musculoskeletal: Negative for arthralgias, joint swelling and neck pain.   Neurological: Negative for weakness, numbness and headaches.   Psychiatric/Behavioral: Negative for confusion, dysphoric mood and sleep disturbance.       Objective:      Physical Exam   Constitutional: She is oriented to person, place, and time. Vital signs are normal. She appears well-developed and well-nourished. No distress.   HENT:   Head: Normocephalic and atraumatic.   Right Ear: External ear normal.   Left Ear: External ear normal.   Nose: Nose normal.   Mouth/Throat: Oropharynx is clear and moist. No oropharyngeal  exudate.   Eyes: Conjunctivae and EOM are normal. Pupils are equal, round, and reactive to light. No scleral icterus.   Neck: Normal range of motion. Neck supple. No JVD present. Carotid bruit is not present. No thyromegaly present.   Cardiovascular: Normal rate, regular rhythm, normal heart sounds, intact distal pulses and normal pulses. Exam reveals no gallop and no friction rub.   No murmur heard.  Pulmonary/Chest: Effort normal and breath sounds normal. No respiratory distress. She has no wheezes. She has no rales.   Abdominal: Soft. Bowel sounds are normal. She exhibits no abdominal bruit and no mass. There is no splenomegaly or hepatomegaly. There is no tenderness. No hernia.   Musculoskeletal: Normal range of motion. She exhibits no edema or tenderness.        Right shoulder: She exhibits no effusion and no deformity.   Lymphadenopathy:     She has no cervical adenopathy.     She has no axillary adenopathy.        Right: No supraclavicular adenopathy present.        Left: No supraclavicular adenopathy present.   Neurological: She is alert and oriented to person, place, and time. She has normal strength. No cranial nerve deficit.   Sensory exam is intact in both feet on monofilament testing.   Skin: Skin is warm and dry. No rash noted.   No foot lesions are present.   Psychiatric: She has a normal mood and affect. Her speech is normal and behavior is normal.   Nursing note and vitals reviewed.      Results for orders placed or performed in visit on 03/04/19   Urinalysis   Result Value Ref Range    Specimen UA Urine, Clean Catch     Color, UA Yellow Yellow, Straw, Asha    Appearance, UA Clear Clear    pH, UA 5.0 5.0 - 8.0    Specific Gravity, UA 1.025 1.005 - 1.030    Protein, UA Negative Negative    Glucose, UA Negative Negative    Ketones, UA Negative Negative    Bilirubin (UA) Negative Negative    Occult Blood UA 1+ (A) Negative    Nitrite, UA Negative Negative    Leukocytes, UA Negative Negative    Urinalysis Microscopic   Result Value Ref Range    RBC, UA 3 0 - 4 /hpf    Squam Epithel, UA 2 /hpf    Microscopic Comment SEE COMMENT      Other labs were reviewed.  Assessment:       1. Well adult exam    2. Type 2 diabetes mellitus without complication, without long-term current use of insulin    3. Hyperlipidemia, unspecified hyperlipidemia type    4. Blood pressure elevated without history of HTN        Plan:       Joanne was seen today for annual exam.  The patient was encouraged to lose weight and increase her exercise level.  She should reduce sodium intake.  Metformin will be reordered.  The patient should also resume Crestor therapy.  Blood tests and a follow-up visit in 3 months is recommended.  Influenza vaccine will be administered today.    Diagnoses and all orders for this visit:    Well adult exam    Type 2 diabetes mellitus without complication, without long-term current use of insulin    Hyperlipidemia, unspecified hyperlipidemia type    Other orders  -     metFORMIN (GLUCOPHAGE) 500 MG tablet; Take 1 tablet (500 mg total) by mouth before dinner.  -     rosuvastatin (CRESTOR) 20 MG tablet; Take 1 tablet (20 mg total) by mouth once daily.  -     blood sugar diagnostic Strp; Test blood sugar once daily

## 2019-03-24 ENCOUNTER — OFFICE VISIT (OUTPATIENT)
Dept: URGENT CARE | Facility: CLINIC | Age: 54
End: 2019-03-24
Payer: COMMERCIAL

## 2019-03-24 VITALS
WEIGHT: 151 LBS | DIASTOLIC BLOOD PRESSURE: 81 MMHG | OXYGEN SATURATION: 99 % | BODY MASS INDEX: 32.58 KG/M2 | TEMPERATURE: 97 F | HEIGHT: 57 IN | RESPIRATION RATE: 20 BRPM | SYSTOLIC BLOOD PRESSURE: 132 MMHG | HEART RATE: 63 BPM

## 2019-03-24 DIAGNOSIS — H61.23 BILATERAL IMPACTED CERUMEN: Primary | ICD-10-CM

## 2019-03-24 PROCEDURE — 99214 OFFICE O/P EST MOD 30 MIN: CPT | Mod: S$GLB,,, | Performed by: FAMILY MEDICINE

## 2019-03-24 PROCEDURE — 3008F PR BODY MASS INDEX (BMI) DOCUMENTED: ICD-10-PCS | Mod: CPTII,S$GLB,, | Performed by: FAMILY MEDICINE

## 2019-03-24 PROCEDURE — 99214 PR OFFICE/OUTPT VISIT, EST, LEVL IV, 30-39 MIN: ICD-10-PCS | Mod: S$GLB,,, | Performed by: FAMILY MEDICINE

## 2019-03-24 PROCEDURE — 3008F BODY MASS INDEX DOCD: CPT | Mod: CPTII,S$GLB,, | Performed by: FAMILY MEDICINE

## 2019-03-24 NOTE — PATIENT INSTRUCTIONS
Earwax Removal    The ear canal makes earwax from the canals lining. The ears make wax to lubricate and protect the ear canal. The ear canal is the tube that connects the middle ear to the outside of the ear. The wax protects the ear from bacteria, infection, and damage from water or trauma.  The wax that forms in the canal naturally moves toward the outside of the ear and falls out. In some cases, the ear may make too much wax. If the wax causes problems or keeps the healthcare provider from seeing into the ear, the extra wax may be removed.  Too much wax can affect your hearing. It can cause itching. In rare cases, it can be painful. Earwax should not be removed unless it is causing a problem. You should not stick objects into your ear to remove wax unless told to do so by your healthcare provider.  Healthcare providers can remove earwax safely. It is important to stay still during the procedure to avoid damage to the ear canal. But removing earwax generally doesnt hurt. You will not usually need anesthesia or pain medicine when the provider removes the earwax.  A number of conditions lead to earwax buildup. These include some skin problems, a narrow ear canal, or ears that make too much earwax. Using cotton swabs in the canal pushes earwax deeper into the ear and contributes to the buildup of earwax.  Home care  · The healthcare provider may recommend mineral oil or an over-the-counter eardrop to use at home to soften the earwax. Use these products only if the provider recommends them. Use these products only if the provider recommends them. Carefully follow the instructions given.  · Dont use mineral oil or OTC eardrops if you might have an ear infection or a ruptured eardrum. Tell your healthcare provider right away if you have diabetes or an immune disorder.  · Dont use cotton swabs in your ears. Cotton swabs may push wax deeper into the ear canal or damage the eardrum. Use cotton gauze or a wet  washcloth  to gently remove wax on the outside of the ear and around the opening to the ear canal.  · Don't use any probing device or object such as cotton-tipped swabs or kevin pins to clean the inside of your ears.  · Dont use ear candles to clean your ears. Candling can be dangerous. It can burn the ear canal. It can also make the condition worse instead of better.  · Dont use cold water to rinse the ear. This will make you dizzy. If your provider tells you to rinse your ear, use only warm water or follow his or her instructions.  · Check the ear for signs of infection or irritation listed below under When to seek medical advice.  Steps for using eardrops  1. Warm the medicine bottle by rubbing it between your hands for a few minutes.  2. Lie down on your side, with the affected ear up.  3. Place the recommended number of drops in the ear. Wet a cotton ball with the medicine. Gently put the cotton ball into the ear opening.  Follow-up care  Follow up with your healthcare provider, or as directed.  When to seek medical advice  Call the provider right away if you have:  · Ear pain that gets worse  · Fever of 100.4F°F (38°C) or higher, or as directed by your healthcare provider  · Worsening wax buildup  · Severe pain, dizziness, or nausea  · Bleeding from the ear  · Hearing problems  · Signs of irritation from the eardrops, such as burning, stinging, or swelling and tenderness  · Foul-smelling fluid draining from the ear  · Swelling, redness, or tenderness of the outer ear  · Headache, neck pain, or stiff neck  Date Last Reviewed: 3/22/2015  © 6312-5371 [employer]. [address] [place] [address], [place] [address]. All rights reserved. This information is not intended as a substitute for professional medical care. Always follow your healthcare professional's instructions.      Make sure that you follow up with your primary care doctor in the next 2-5 days if needed .  Return to the Urgent Care if signs or  symptoms change and certainly if you have worsening symptoms go to the nearest emergency department for further evaluation.

## 2019-03-24 NOTE — PROGRESS NOTES
"Subjective:       Patient ID: Joanne Stallings is a 53 y.o. female.    Vitals:  height is 4' 9" (1.448 m) and weight is 68.5 kg (151 lb). Her tympanic temperature is 97 °F (36.1 °C). Her blood pressure is 132/81 and her pulse is 63. Her respiration is 20 and oxygen saturation is 99%.     Chief Complaint: Otalgia    This is a 53 y.o. female   with Past Medical History:  No date: Chronic constipation  No date: Depression  No date: Diabetes mellitus  No date: Hyperlipidemia   and Past Surgical History:  No date:  SECTION  2016: COLONOSCOPY; N/A      Comment:  Performed by Angel Sawyer MD at [facility] (4TH FLR)  2007: HYSTERECTOMY      Comment:  ALLIE, ovarires and cervix  remain (fibroids)  who presents today with a chief complaint of right ear pain that began two days ago. Her ear hurts more during the night. The pain is beginning to travel down the right side of her neck. She hasn't taken any medication to help relieve her symptoms.     Otalgia    There is pain in the right ear. This is a new problem. The current episode started in the past 7 days. The problem occurs constantly. The problem has been gradually worsening. There has been no fever. The pain is at a severity of 5/10. The pain is moderate. Pertinent negatives include no coughing, diarrhea, headaches, rash, sore throat or vomiting. She has tried nothing for the symptoms.       Constitution: Negative for chills, fatigue and fever.   HENT: Positive for ear pain and tinnitus. Negative for congestion and sore throat.    Neck: Negative for painful lymph nodes.   Cardiovascular: Negative for chest pain and leg swelling.   Eyes: Negative for double vision and blurred vision.   Respiratory: Negative for cough and shortness of breath.    Gastrointestinal: Negative for nausea, vomiting and diarrhea.   Genitourinary: Negative for dysuria, frequency, urgency and history of kidney stones.   Musculoskeletal: Negative for joint pain, joint swelling, muscle " cramps and muscle ache.   Skin: Negative for color change, pale, rash and bruising.   Allergic/Immunologic: Negative for seasonal allergies.   Neurological: Negative for dizziness, history of vertigo, light-headedness, passing out and headaches.   Hematologic/Lymphatic: Negative for swollen lymph nodes.   Psychiatric/Behavioral: Negative for nervous/anxious, sleep disturbance and depression. The patient is not nervous/anxious.        Objective:      Physical Exam   Constitutional: She is oriented to person, place, and time. She appears well-developed and well-nourished. She is cooperative.  Non-toxic appearance. She does not appear ill. No distress.   HENT:   Head: Normocephalic and atraumatic.   Right Ear: Hearing, tympanic membrane and ear canal normal.   Left Ear: Hearing, tympanic membrane and ear canal normal.   Nose: Nose normal. No mucosal edema, rhinorrhea or nasal deformity. No epistaxis. Right sinus exhibits no maxillary sinus tenderness and no frontal sinus tenderness. Left sinus exhibits no maxillary sinus tenderness and no frontal sinus tenderness.   Mouth/Throat: Uvula is midline, oropharynx is clear and moist and mucous membranes are normal. No trismus in the jaw. Normal dentition. No uvula swelling. No oropharyngeal exudate or posterior oropharyngeal erythema.   Both ear canals packed with cerumen.  Impactions removed with lavage.  Underlying TMs pearly gray with good landmarks.  Minimal irritation  ear canal.   Eyes: Pupils are equal, round, and reactive to light. Conjunctivae and lids are normal. No scleral icterus.   Sclera clear bilat   Neck: Trachea normal, normal range of motion, full passive range of motion without pain and phonation normal. Neck supple.   Cardiovascular: Normal rate, regular rhythm, normal heart sounds, intact distal pulses and normal pulses.   Pulmonary/Chest: Effort normal and breath sounds normal. No stridor. No respiratory distress. She has no wheezes. She has no rales.    Abdominal: Normal appearance and bowel sounds are normal.   Musculoskeletal: Normal range of motion. She exhibits no edema or deformity.   Lymphadenopathy:     She has no cervical adenopathy.   Neurological: She is alert and oriented to person, place, and time. She exhibits normal muscle tone. Coordination normal.   Skin: Skin is warm, dry and intact. She is not diaphoretic. No pallor.   Psychiatric: She has a normal mood and affect. Her speech is normal and behavior is normal. Judgment and thought content normal. Cognition and memory are normal.   Nursing note and vitals reviewed.      Assessment:       1. Bilateral impacted cerumen        Plan:         Bilateral impacted cerumen    Make sure that you follow up with your primary care doctor in the next 2-5 days if needed .  Return to the Urgent Care if signs or symptoms change and certainly if you have worsening symptoms go to the nearest emergency department for further evaluation.

## 2019-03-26 ENCOUNTER — PATIENT MESSAGE (OUTPATIENT)
Dept: INTERNAL MEDICINE | Facility: CLINIC | Age: 54
End: 2019-03-26

## 2019-03-27 DIAGNOSIS — E11.9 DIABETES MELLITUS WITHOUT COMPLICATION: Primary | ICD-10-CM

## 2019-03-27 RX ORDER — LANCETS
EACH MISCELLANEOUS
Refills: 0 | Status: CANCELLED | OUTPATIENT
Start: 2019-03-27

## 2019-03-27 RX ORDER — INSULIN PUMP SYRINGE, 3 ML
EACH MISCELLANEOUS
Refills: 0 | Status: CANCELLED | OUTPATIENT
Start: 2019-03-27 | End: 2020-03-26

## 2019-04-01 ENCOUNTER — OFFICE VISIT (OUTPATIENT)
Dept: OPTOMETRY | Facility: CLINIC | Age: 54
End: 2019-04-01
Payer: COMMERCIAL

## 2019-04-01 DIAGNOSIS — H35.411 LATTICE DEGENERATION OF PERIPHERAL RETINA, RIGHT: ICD-10-CM

## 2019-04-01 DIAGNOSIS — H10.13 ALLERGIC CONJUNCTIVITIS, BILATERAL: ICD-10-CM

## 2019-04-01 DIAGNOSIS — H52.4 MYOPIA WITH PRESBYOPIA OF BOTH EYES: ICD-10-CM

## 2019-04-01 DIAGNOSIS — Z46.0 FITTING AND ADJUSTMENT OF SPECTACLES AND CONTACT LENSES: Primary | ICD-10-CM

## 2019-04-01 DIAGNOSIS — E11.9 TYPE 2 DIABETES MELLITUS WITHOUT RETINOPATHY: Primary | ICD-10-CM

## 2019-04-01 DIAGNOSIS — H52.13 MYOPIA WITH PRESBYOPIA OF BOTH EYES: ICD-10-CM

## 2019-04-01 PROCEDURE — 92015 DETERMINE REFRACTIVE STATE: CPT | Mod: S$GLB,,, | Performed by: OPTOMETRIST

## 2019-04-01 PROCEDURE — 92014 PR EYE EXAM, EST PATIENT,COMPREHESV: ICD-10-PCS | Mod: S$GLB,,, | Performed by: OPTOMETRIST

## 2019-04-01 PROCEDURE — 99999 PR PBB SHADOW E&M-EST. PATIENT-LVL III: CPT | Mod: PBBFAC,,, | Performed by: OPTOMETRIST

## 2019-04-01 PROCEDURE — 92310 CONTACT LENS FITTING OU: CPT | Mod: ,,, | Performed by: OPTOMETRIST

## 2019-04-01 PROCEDURE — 99999 PR PBB SHADOW E&M-EST. PATIENT-LVL III: ICD-10-PCS | Mod: PBBFAC,,, | Performed by: OPTOMETRIST

## 2019-04-01 PROCEDURE — 92015 PR REFRACTION: ICD-10-PCS | Mod: S$GLB,,, | Performed by: OPTOMETRIST

## 2019-04-01 PROCEDURE — 92014 COMPRE OPH EXAM EST PT 1/>: CPT | Mod: S$GLB,,, | Performed by: OPTOMETRIST

## 2019-04-01 PROCEDURE — 92310 PR CONTACT LENS FITTING (NO CHANGE): ICD-10-PCS | Mod: ,,, | Performed by: OPTOMETRIST

## 2019-04-01 NOTE — PROGRESS NOTES
ORVILLE NUNES 02/2018  Diabetic doesn't monitor BS at home.    Patient wears   monthly contacts and has been noticing the OS contact sometimes gets a   film on it, and has had several episodes in the past year.  Patient also   has seasonal allergies and the film happens at the same time as allergies.    Patient doesn't use any AT's or allergy drops.  Distance vision seems   fine with contacts, patient wears OTC +2.00 with contacts for reading.    Patient doesn't have any glasses and would like updated RX today.     Hemoglobin A1C       Date                     Value               Ref Range             Status                03/04/2019               6.7 (H)             4.0 - 5.6 %           Final                   03/05/2018               6.3 (H)             4.0 - 5.6 %           Final             12/19/2016               6.6 (H)             4.5 - 6.2 %           Final           Last edited by Carlotta Herbert on 4/1/2019  1:42 PM. (History)            Assessment /Plan     For exam results, see Encounter Report.    Type 2 diabetes mellitus without retinopathy    Lattice degeneration of peripheral retina, right    Allergic conjunctivitis, bilateral    Myopia with presbyopia of both eyes      1. No diabetic retinopathy, no csme. Return in 1 year for dilated eye exam.  2. Stable, no new floaters, Monitor condition. Patient to report any changes. RTC 1 year recheck.  3. Recommended OTC Zaditor bid OU for itching  4. New Spec Rx given and Contact lens Rx released to pt. Daily wear only advised, with education to risks of extended wear.  Discussed lens care, compliance and solutions. RTC yearly contact lens follow up.   . Different lens options discussed with patient. RTC 1 year full exam.

## 2019-04-15 RX ORDER — INSULIN PUMP SYRINGE, 3 ML
EACH MISCELLANEOUS
Qty: 1 EACH | Refills: 3 | Status: SHIPPED | OUTPATIENT
Start: 2019-04-15 | End: 2021-09-30 | Stop reason: SDUPTHER

## 2019-04-15 RX ORDER — LANCETS
EACH MISCELLANEOUS
Qty: 100 EACH | Refills: 3 | Status: SHIPPED | OUTPATIENT
Start: 2019-04-15 | End: 2019-05-10

## 2019-05-10 ENCOUNTER — OFFICE VISIT (OUTPATIENT)
Dept: URGENT CARE | Facility: CLINIC | Age: 54
End: 2019-05-10
Payer: COMMERCIAL

## 2019-05-10 VITALS
RESPIRATION RATE: 18 BRPM | BODY MASS INDEX: 32.58 KG/M2 | SYSTOLIC BLOOD PRESSURE: 132 MMHG | WEIGHT: 151 LBS | HEIGHT: 57 IN | OXYGEN SATURATION: 100 % | TEMPERATURE: 98 F | DIASTOLIC BLOOD PRESSURE: 67 MMHG | HEART RATE: 81 BPM

## 2019-05-10 DIAGNOSIS — E11.9 TYPE 2 DIABETES MELLITUS WITHOUT COMPLICATION: ICD-10-CM

## 2019-05-10 DIAGNOSIS — S46.312A TRICEPS STRAIN, LEFT, INITIAL ENCOUNTER: Primary | ICD-10-CM

## 2019-05-10 DIAGNOSIS — S49.92XA LEFT UPPER ARM INJURY, INITIAL ENCOUNTER: ICD-10-CM

## 2019-05-10 PROCEDURE — 3008F PR BODY MASS INDEX (BMI) DOCUMENTED: ICD-10-PCS | Mod: CPTII,S$GLB,, | Performed by: PHYSICIAN ASSISTANT

## 2019-05-10 PROCEDURE — 3008F BODY MASS INDEX DOCD: CPT | Mod: CPTII,S$GLB,, | Performed by: PHYSICIAN ASSISTANT

## 2019-05-10 PROCEDURE — 99214 PR OFFICE/OUTPT VISIT, EST, LEVL IV, 30-39 MIN: ICD-10-PCS | Mod: S$GLB,,, | Performed by: PHYSICIAN ASSISTANT

## 2019-05-10 PROCEDURE — 73060 XR HUMERUS 2 VIEW LEFT: ICD-10-PCS | Mod: FY,LT,S$GLB, | Performed by: RADIOLOGY

## 2019-05-10 PROCEDURE — 73060 X-RAY EXAM OF HUMERUS: CPT | Mod: FY,LT,S$GLB, | Performed by: RADIOLOGY

## 2019-05-10 PROCEDURE — 99214 OFFICE O/P EST MOD 30 MIN: CPT | Mod: S$GLB,,, | Performed by: PHYSICIAN ASSISTANT

## 2019-05-11 NOTE — PROGRESS NOTES
"Subjective:       Patient ID: Joanne Stallings is a 53 y.o. female.    Vitals:  height is 4' 9" (1.448 m) and weight is 68.5 kg (151 lb). Her temperature is 98.2 °F (36.8 °C). Her blood pressure is 132/67 and her pulse is 81. Her respiration is 18 and oxygen saturation is 100%.     Chief Complaint: Arm Pain (left arm)    Arm Pain    The incident occurred 2 days ago. Incident location: [facility]. The injury mechanism was a fall. The pain is present in the left shoulder. The quality of the pain is described as aching. The pain is at a severity of 5/10. The pain is mild. The pain has been constant since the incident. Pertinent negatives include no chest pain. Nothing aggravates the symptoms. She has tried nothing for the symptoms.       Constitution: Negative for chills, fatigue and fever.   HENT: Negative for congestion and sore throat.    Neck: Negative for painful lymph nodes.   Cardiovascular: Negative for chest pain and leg swelling.   Eyes: Negative for double vision and blurred vision.   Respiratory: Negative for cough and shortness of breath.    Gastrointestinal: Negative for nausea, vomiting and diarrhea.   Genitourinary: Negative for dysuria, frequency, urgency and history of kidney stones.   Musculoskeletal: Positive for joint pain. Negative for joint swelling, muscle cramps and muscle ache.   Skin: Negative for color change, pale, rash and bruising.   Allergic/Immunologic: Negative for seasonal allergies.   Neurological: Negative for dizziness, history of vertigo, light-headedness, passing out and headaches.   Hematologic/Lymphatic: Negative for swollen lymph nodes.   Psychiatric/Behavioral: Negative for nervous/anxious, sleep disturbance and depression. The patient is not nervous/anxious.        Objective:      Physical Exam   Constitutional: She is oriented to person, place, and time. She appears well-developed and well-nourished. She is cooperative.  Non-toxic appearance. She does not appear ill. No " distress.   HENT:   Head: Normocephalic and atraumatic. Head is without abrasion, without contusion and without laceration.   Right Ear: Hearing, tympanic membrane, external ear and ear canal normal. No hemotympanum.   Left Ear: Hearing, tympanic membrane, external ear and ear canal normal. No hemotympanum.   Nose: Nose normal. No mucosal edema, rhinorrhea or nasal deformity. No epistaxis. Right sinus exhibits no maxillary sinus tenderness and no frontal sinus tenderness. Left sinus exhibits no maxillary sinus tenderness and no frontal sinus tenderness.   Mouth/Throat: Uvula is midline, oropharynx is clear and moist and mucous membranes are normal. No trismus in the jaw. Normal dentition. No uvula swelling. No posterior oropharyngeal erythema.   Eyes: Pupils are equal, round, and reactive to light. Conjunctivae, EOM and lids are normal. Right eye exhibits no discharge. Left eye exhibits no discharge. No scleral icterus.   Sclera clear bilat   Neck: Trachea normal, normal range of motion, full passive range of motion without pain and phonation normal. Neck supple. No spinous process tenderness and no muscular tenderness present. No neck rigidity. No tracheal deviation present.   Cardiovascular: Normal rate, regular rhythm, normal heart sounds, intact distal pulses and normal pulses.   Pulmonary/Chest: Effort normal and breath sounds normal. No respiratory distress.   Abdominal: Soft. Normal appearance and bowel sounds are normal. She exhibits no distension, no pulsatile midline mass and no mass. There is no tenderness.   Musculoskeletal: Normal range of motion. She exhibits no deformity.        Left shoulder: She exhibits normal range of motion, no tenderness, no bony tenderness, no swelling, no effusion, no crepitus, no deformity, no laceration, no pain, no spasm, normal pulse and normal strength.        Left elbow: She exhibits normal range of motion, no swelling, no effusion, no deformity and no laceration. No  tenderness found. No radial head, no medial epicondyle, no lateral epicondyle and no olecranon process tenderness noted.        Left wrist: She exhibits normal range of motion, no tenderness, no bony tenderness, no swelling, no effusion, no crepitus, no deformity and no laceration.        Left upper arm: She exhibits tenderness and edema. She exhibits no bony tenderness, no deformity and no laceration.        Left forearm: She exhibits no tenderness, no bony tenderness, no swelling, no edema, no deformity and no laceration.        Left hand: She exhibits normal range of motion, no tenderness, no bony tenderness, normal two-point discrimination, normal capillary refill, no deformity, no laceration and no swelling. Normal sensation noted. Normal strength noted.   Neurological: She is alert and oriented to person, place, and time. She has normal strength. No cranial nerve deficit or sensory deficit. She exhibits normal muscle tone. She displays no seizure activity. Coordination normal. GCS eye subscore is 4. GCS verbal subscore is 5. GCS motor subscore is 6.   Skin: Skin is warm, dry and intact. Capillary refill takes less than 2 seconds. No abrasion, no bruising, no burn, no ecchymosis and no laceration noted. She is not diaphoretic. No pallor.   Psychiatric: She has a normal mood and affect. Her speech is normal and behavior is normal. Judgment and thought content normal. Cognition and memory are normal.   Nursing note and vitals reviewed.          XRAY: No acute fractures or dislocations    Assessment:       1. Triceps strain, left, initial encounter    2. Left upper arm injury, initial encounter        Plan:         Triceps strain, left, initial encounter    Left upper arm injury, initial encounter  -     X-Ray Humerus 2 View Left; Future; Expected date: 05/10/2019          Upper Extremity Contusion  You have a contusion (bruise) of an upper extremity (arm, wrist, hand, or fingers). Symptoms include pain, swelling,  and skin discoloration. No bones are broken. This injury may take from a few days to a few weeks to heal.  During that time, the bruise may change from reddish in color, to purple-blue, to green-yellow, to yellow-brown.  Home care  · Unless another medicine was prescribed, you can take acetaminophen, ibuprofen, or naproxen to control pain. (If you have chronic liver or kidney disease or ever had a stomach ulcer or gastrointestinal bleeding, talk with your doctor before using these medicines.)  · Elevate the injured area to reduce pain and swelling. As much as possible, sit or lie down with the injured area raised about the level of your heart. This is especially important during the first 48 hours.  · Ice the injured area to help reduce pain and swelling. Wrap a cold source (ice pack or ice cubes in a plastic bag) in a thin towel. Apply to the bruised area for 20 minutes every 1 to 2 hours the first day. Continue this 3 to 4 times a day until the pain and swelling goes away.  · If a sling was provided, you may remove it to shower or bathe. To prevent joint stiffness, do not wear it for more than 1 week.  Follow-up care  Follow up with your healthcare provide, or as advised. Call if you are not improving within the next 1 to 2 weeks.  When to seek medical advice   Call your healthcare provider right away if any of these occur:  · Increased pain or swelling  · Hand or fingers become cold, blue, numb or tingly  · Signs of infection: Warmth, drainage, or increased redness or pain around the injury  · Inability to move the injured body part   · Frequent bruising for unknown reasons  Date Last Reviewed: 2/1/2017  © 8610-4245 [employer]. [address] [place] [address], [place] [address]. All rights reserved. This information is not intended as a substitute for professional medical care. Always follow your healthcare professional's instructions.      Please follow up with your Primary care provider within 2-5 days if  your signs and symptoms have not resolved or worsen.     If your condition worsens or fails to improve we recommend that you receive another evaluation at the emergency room immediately or contact your primary medical clinic to discuss your concerns.   You must understand that you have received an Urgent Care treatment only and that you may be released before all of your medical problems are known or treated. You, the patient, will arrange for follow up care as instructed.     RED FLAGS/WARNING SYMPTOMS DISCUSSED WITH PATIENT THAT WOULD WARRANT EMERGENT MEDICAL ATTENTION. PATIENT VERBALIZED UNDERSTANDING.

## 2019-05-11 NOTE — PATIENT INSTRUCTIONS
Upper Extremity Contusion  You have a contusion (bruise) of an upper extremity (arm, wrist, hand, or fingers). Symptoms include pain, swelling, and skin discoloration. No bones are broken. This injury may take from a few days to a few weeks to heal.  During that time, the bruise may change from reddish in color, to purple-blue, to green-yellow, to yellow-brown.  Home care  · Unless another medicine was prescribed, you can take acetaminophen, ibuprofen, or naproxen to control pain. (If you have chronic liver or kidney disease or ever had a stomach ulcer or gastrointestinal bleeding, talk with your doctor before using these medicines.)  · Elevate the injured area to reduce pain and swelling. As much as possible, sit or lie down with the injured area raised about the level of your heart. This is especially important during the first 48 hours.  · Ice the injured area to help reduce pain and swelling. Wrap a cold source (ice pack or ice cubes in a plastic bag) in a thin towel. Apply to the bruised area for 20 minutes every 1 to 2 hours the first day. Continue this 3 to 4 times a day until the pain and swelling goes away.  · If a sling was provided, you may remove it to shower or bathe. To prevent joint stiffness, do not wear it for more than 1 week.  Follow-up care  Follow up with your healthcare provide, or as advised. Call if you are not improving within the next 1 to 2 weeks.  When to seek medical advice   Call your healthcare provider right away if any of these occur:  · Increased pain or swelling  · Hand or fingers become cold, blue, numb or tingly  · Signs of infection: Warmth, drainage, or increased redness or pain around the injury  · Inability to move the injured body part   · Frequent bruising for unknown reasons  Date Last Reviewed: 2/1/2017  © 1853-6733 [employer]. [address] [place] [address], [place] [address]. All rights reserved. This information is not intended as a substitute for professional  medical care. Always follow your healthcare professional's instructions.      Please follow up with your Primary care provider within 2-5 days if your signs and symptoms have not resolved or worsen.     If your condition worsens or fails to improve we recommend that you receive another evaluation at the emergency room immediately or contact your primary medical clinic to discuss your concerns.   You must understand that you have received an Urgent Care treatment only and that you may be released before all of your medical problems are known or treated. You, the patient, will arrange for follow up care as instructed.     RED FLAGS/WARNING SYMPTOMS DISCUSSED WITH PATIENT THAT WOULD WARRANT EMERGENT MEDICAL ATTENTION. PATIENT VERBALIZED UNDERSTANDING.

## 2019-05-29 ENCOUNTER — LAB VISIT (OUTPATIENT)
Dept: LAB | Facility: OTHER | Age: 54
End: 2019-05-29
Payer: COMMERCIAL

## 2019-05-29 ENCOUNTER — PATIENT MESSAGE (OUTPATIENT)
Dept: INTERNAL MEDICINE | Facility: CLINIC | Age: 54
End: 2019-05-29

## 2019-05-29 ENCOUNTER — OFFICE VISIT (OUTPATIENT)
Dept: OBSTETRICS AND GYNECOLOGY | Facility: CLINIC | Age: 54
End: 2019-05-29
Payer: COMMERCIAL

## 2019-05-29 VITALS — WEIGHT: 151 LBS | DIASTOLIC BLOOD PRESSURE: 76 MMHG | BODY MASS INDEX: 32.68 KG/M2 | SYSTOLIC BLOOD PRESSURE: 126 MMHG

## 2019-05-29 DIAGNOSIS — N64.52 NIPPLE DISCHARGE: Primary | ICD-10-CM

## 2019-05-29 DIAGNOSIS — N64.52 NIPPLE DISCHARGE: ICD-10-CM

## 2019-05-29 DIAGNOSIS — N64.4 BREAST PAIN, LEFT: ICD-10-CM

## 2019-05-29 LAB
ALBUMIN SERPL BCP-MCNC: 3.9 G/DL (ref 3.5–5.2)
ALP SERPL-CCNC: 77 U/L (ref 55–135)
ALT SERPL W/O P-5'-P-CCNC: 14 U/L (ref 10–44)
ANION GAP SERPL CALC-SCNC: 10 MMOL/L (ref 8–16)
AST SERPL-CCNC: 18 U/L (ref 10–40)
BILIRUB SERPL-MCNC: 0.2 MG/DL (ref 0.1–1)
BUN SERPL-MCNC: 16 MG/DL (ref 6–20)
CALCIUM SERPL-MCNC: 9.5 MG/DL (ref 8.7–10.5)
CHLORIDE SERPL-SCNC: 105 MMOL/L (ref 95–110)
CO2 SERPL-SCNC: 26 MMOL/L (ref 23–29)
CREAT SERPL-MCNC: 0.9 MG/DL (ref 0.5–1.4)
EST. GFR  (AFRICAN AMERICAN): >60 ML/MIN/1.73 M^2
EST. GFR  (NON AFRICAN AMERICAN): >60 ML/MIN/1.73 M^2
GLUCOSE SERPL-MCNC: 143 MG/DL (ref 70–110)
POTASSIUM SERPL-SCNC: 4.1 MMOL/L (ref 3.5–5.1)
PROLACTIN SERPL IA-MCNC: 6.5 NG/ML (ref 5.2–26.5)
PROT SERPL-MCNC: 7.8 G/DL (ref 6–8.4)
SODIUM SERPL-SCNC: 141 MMOL/L (ref 136–145)
TSH SERPL DL<=0.005 MIU/L-ACNC: 1.22 UIU/ML (ref 0.4–4)

## 2019-05-29 PROCEDURE — 84146 ASSAY OF PROLACTIN: CPT

## 2019-05-29 PROCEDURE — 84443 ASSAY THYROID STIM HORMONE: CPT

## 2019-05-29 PROCEDURE — 99213 PR OFFICE/OUTPT VISIT, EST, LEVL III, 20-29 MIN: ICD-10-PCS | Mod: S$GLB,,, | Performed by: NURSE PRACTITIONER

## 2019-05-29 PROCEDURE — 80053 COMPREHEN METABOLIC PANEL: CPT

## 2019-05-29 PROCEDURE — 3008F PR BODY MASS INDEX (BMI) DOCUMENTED: ICD-10-PCS | Mod: CPTII,S$GLB,, | Performed by: NURSE PRACTITIONER

## 2019-05-29 PROCEDURE — 99999 PR PBB SHADOW E&M-EST. PATIENT-LVL III: CPT | Mod: PBBFAC,,, | Performed by: NURSE PRACTITIONER

## 2019-05-29 PROCEDURE — 36415 COLL VENOUS BLD VENIPUNCTURE: CPT

## 2019-05-29 PROCEDURE — 99213 OFFICE O/P EST LOW 20 MIN: CPT | Mod: S$GLB,,, | Performed by: NURSE PRACTITIONER

## 2019-05-29 PROCEDURE — 99999 PR PBB SHADOW E&M-EST. PATIENT-LVL III: ICD-10-PCS | Mod: PBBFAC,,, | Performed by: NURSE PRACTITIONER

## 2019-05-29 PROCEDURE — 3008F BODY MASS INDEX DOCD: CPT | Mod: CPTII,S$GLB,, | Performed by: NURSE PRACTITIONER

## 2019-05-29 NOTE — PROGRESS NOTES
CC: nipple discharge    Joanne Stallings  complains of nipple discharge from the left breast and left breast tingling that she noticed 1 weeks ago.  Reports the nipple discharge is only when she manually expresses- no spontaneous.  Pain is located in the left outer quadrant of the left breast.  She denies skin changes.  She denies any new asymmetry.   She is not breastfeeding or pumping. She is not on HRT. Denies family h/o breast cancer.  Last mammo 2017- WNL. She and her boyfriend are adopting 4 children from  sister. Works as a teacher.      ROS:  GENERAL: No chills, fatigability or weight loss.  NEUROLOGICAL: No headaches. No vision changes.  CARDIOVASCULAR: No chest pain. No shortness of breath. No leg cramps.  ABDOMEN: No abdominal pain. Denies nausea. Denies vomiting. No diarrhea. No constipation  BREAST: See HPI  URINARY: No incontinence, no nocturia, no frequency and no dysuria.  VULVAR: No pain, no lesions and no itching.  VAGINA: No relaxation, no itching, no discharge, no abnormal bleeding and no lesions.      Vitals:    19 1511   BP: 126/76     GENERAL: healthy  Neck: neck supply, no thryomegaly  LYMPH: No epitrochlear, supraclavicular, or axillary lymphadenopathy  BREASTS: Symmetrical, no skin changes or visible lesions. + 1.5 cm palpable mass to left breast at 3 o'clock, moveable. + tender to palpation. No nipple discharge or adenopathy bilaterally.  ABDOMEN: no masses, hepatosplenomegaly, hernias    Joanne was seen today for breast pain.    Diagnoses and all orders for this visit:    Nipple discharge  -     US Breast Left Complete; Future  -     Mammo Digital Diagnostic Left with Dusty; Future  -     Prolactin; Future  -     Comprehensive metabolic panel; Future  -     TSH; Future    Breast pain, left  -     US Breast Left Complete; Future  -     Mammo Digital Diagnostic Left with Dusty; Future      Left US and diagnostic  Mammo   Discussed avoiding manually expressing nipple  DC  Discussed supportive measures with a good support bra, anti-inflammatories (ibuprofen) as needed, warm compresses  Discussed will refer to breast surgeon pending any abnormal imaging  Labs for eval of nipple DC   F/U PRN no resolution of symptoms    Yvonne Salazar, FNP-C

## 2019-06-03 ENCOUNTER — HOSPITAL ENCOUNTER (OUTPATIENT)
Dept: RADIOLOGY | Facility: HOSPITAL | Age: 54
Discharge: HOME | End: 2019-06-03
Attending: NURSE PRACTITIONER
Payer: COMMERCIAL

## 2019-06-03 ENCOUNTER — TELEPHONE (OUTPATIENT)
Dept: OBSTETRICS AND GYNECOLOGY | Facility: CLINIC | Age: 54
End: 2019-06-03

## 2019-06-03 DIAGNOSIS — N64.4 BREAST PAIN, LEFT: ICD-10-CM

## 2019-06-03 DIAGNOSIS — N64.52 NIPPLE DISCHARGE: ICD-10-CM

## 2019-06-03 PROCEDURE — 77062 BREAST TOMOSYNTHESIS BI: CPT | Mod: 26,,, | Performed by: RADIOLOGY

## 2019-06-03 PROCEDURE — 77062 BREAST TOMOSYNTHESIS BI: CPT | Mod: TC,PO

## 2019-06-03 PROCEDURE — 77062 MAMMO DIGITAL DIAGNOSTIC BILAT WITH TOMOSYNTHESIS_CAD: ICD-10-PCS | Mod: 26,,, | Performed by: RADIOLOGY

## 2019-06-03 PROCEDURE — 76642 ULTRASOUND BREAST LIMITED: CPT | Mod: 26,LT,, | Performed by: RADIOLOGY

## 2019-06-03 PROCEDURE — 77066 DX MAMMO INCL CAD BI: CPT | Mod: 26,,, | Performed by: RADIOLOGY

## 2019-06-03 PROCEDURE — 76642 ULTRASOUND BREAST LIMITED: CPT | Mod: TC,PO,LT

## 2019-06-03 PROCEDURE — 77066 MAMMO DIGITAL DIAGNOSTIC BILAT WITH TOMOSYNTHESIS_CAD: ICD-10-PCS | Mod: 26,,, | Performed by: RADIOLOGY

## 2019-06-03 PROCEDURE — 76642 US BREAST LEFT LIMITED: ICD-10-PCS | Mod: 26,LT,, | Performed by: RADIOLOGY

## 2019-06-03 NOTE — TELEPHONE ENCOUNTER
----- Message from Caty Edge sent at 6/3/2019  4:25 PM CDT -----  Contact: ISABEL TRIPLETT   Name of Who is Calling: ISABEL TRIPLETT       What is the request in detail: Patient was returning a missed call back from the staff       Can the clinic reply by [facility]: no      What [facility] [phone]

## 2019-06-04 ENCOUNTER — TELEPHONE (OUTPATIENT)
Dept: OBSTETRICS AND GYNECOLOGY | Facility: CLINIC | Age: 54
End: 2019-06-04

## 2019-06-04 ENCOUNTER — PATIENT MESSAGE (OUTPATIENT)
Dept: OBSTETRICS AND GYNECOLOGY | Facility: CLINIC | Age: 54
End: 2019-06-04

## 2019-06-14 DIAGNOSIS — Z12.39 BREAST CANCER SCREENING: ICD-10-CM

## 2019-06-26 ENCOUNTER — HOSPITAL ENCOUNTER (OUTPATIENT)
Dept: RADIOLOGY | Facility: HOSPITAL | Age: 54
Discharge: HOME | End: 2019-06-26
Attending: HOSPITALIST
Payer: COMMERCIAL

## 2019-06-26 ENCOUNTER — OFFICE VISIT (OUTPATIENT)
Dept: INTERNAL MEDICINE | Facility: CLINIC | Age: 54
End: 2019-06-26
Payer: COMMERCIAL

## 2019-06-26 VITALS
DIASTOLIC BLOOD PRESSURE: 70 MMHG | SYSTOLIC BLOOD PRESSURE: 120 MMHG | BODY MASS INDEX: 31.96 KG/M2 | RESPIRATION RATE: 16 BRPM | HEIGHT: 57 IN | HEART RATE: 70 BPM | TEMPERATURE: 98 F | WEIGHT: 148.13 LBS
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
## 2019-06-26 DIAGNOSIS — M54.2 NECK PAIN: ICD-10-CM

## 2019-06-26 DIAGNOSIS — M79.601 RIGHT ARM PAIN: Primary | ICD-10-CM

## 2019-06-26 PROCEDURE — 99214 OFFICE O/P EST MOD 30 MIN: CPT | Mod: S$GLB,,, | Performed by: HOSPITALIST

## 2019-06-26 PROCEDURE — 3008F PR BODY MASS INDEX (BMI) DOCUMENTED: ICD-10-PCS | Mod: CPTII,S$GLB,, | Performed by: HOSPITALIST

## 2019-06-26 PROCEDURE — 72050 X-RAY EXAM NECK SPINE 4/5VWS: CPT | Mod: TC,PO

## 2019-06-26 PROCEDURE — 99214 PR OFFICE/OUTPT VISIT, EST, LEVL IV, 30-39 MIN: ICD-10-PCS | Mod: S$GLB,,, | Performed by: HOSPITALIST

## 2019-06-26 PROCEDURE — 99999 PR PBB SHADOW E&M-EST. PATIENT-LVL III: ICD-10-PCS | Mod: PBBFAC,,, | Performed by: HOSPITALIST

## 2019-06-26 PROCEDURE — 72050 XR CERVICAL SPINE COMPLETE 5 VIEW: ICD-10-PCS | Mod: 26,,, | Performed by: RADIOLOGY

## 2019-06-26 PROCEDURE — 72050 X-RAY EXAM NECK SPINE 4/5VWS: CPT | Mod: 26,,, | Performed by: RADIOLOGY

## 2019-06-26 PROCEDURE — 3008F BODY MASS INDEX DOCD: CPT | Mod: CPTII,S$GLB,, | Performed by: HOSPITALIST

## 2019-06-26 PROCEDURE — 99999 PR PBB SHADOW E&M-EST. PATIENT-LVL III: CPT | Mod: PBBFAC,,, | Performed by: HOSPITALIST

## 2019-06-26 RX ORDER — TRIAMCINOLONE ACETONIDE 1 MG/G
CREAM TOPICAL 2 TIMES DAILY
Qty: 28.4 G | Refills: 0 | Status: SHIPPED | OUTPATIENT
Start: 2019-06-26 | End: 2020-10-13 | Stop reason: CLARIF

## 2019-06-26 RX ORDER — METHOCARBAMOL 500 MG/1
500 TABLET, FILM COATED ORAL NIGHTLY PRN
Qty: 30 TABLET | Refills: 0 | Status: SHIPPED | OUTPATIENT
Start: 2019-06-26 | End: 2019-07-06

## 2019-06-26 NOTE — PROGRESS NOTES
"Subjective:     @Patient ID: Joanne Stallings is a 53 y.o. female.    Chief Complaint: Arm Pain (right, spasms.  painful to touch and movement); Hand Pain (right); and Insect Bite (exposure to bed bugs)    HPI    54 yo F present with R arm pain and rash. Reports bit by bed bugs on Saturday, 5 days ago. Has been itching and red bumps on R arm and neck. Has not noticed it until 3 days ago.  And reports R upper arm has been painful. Reports the hand is achy. Has been having sharp pains in the neck which radiate down her arm. She reports she has been seen by physicians for this and undergone physical therapy which helped the pain resolve. However sx have returned again    Review of Systems   Constitutional: Negative for chills and fever.   HENT: Negative for congestion and sore throat.    Eyes: Negative for pain and visual disturbance.   Respiratory: Negative for cough and shortness of breath.    Cardiovascular: Negative for chest pain and leg swelling.   Gastrointestinal: Negative for abdominal pain, nausea and vomiting.   Endocrine: Negative for polydipsia and polyuria.   Genitourinary: Negative for difficulty urinating and dysuria.   Musculoskeletal: Positive for arthralgias, myalgias and neck pain. Negative for back pain.   Skin: Positive for rash. Negative for wound.   Neurological: Negative for dizziness, weakness and headaches.   Psychiatric/Behavioral: Negative for agitation and confusion.     Past medical history, surgical history, and family medical history reviewed and updated as appropriate.    Medications and allergies reviewed.      Objective:     Vitals:    06/26/19 1049   BP: 120/70   BP Location: Right arm   Patient Position: Sitting   BP Method: Large (Manual)   Pulse: 70   Resp: 16   Temp: 98 °F (36.7 °C)   TempSrc: Oral   Weight: 67.2 kg (148 lb 2.4 oz)   Height: 4' 9" (1.448 m)     Body mass index is 32.06 kg/m².  Physical Exam   Constitutional: She is oriented to person, place, and time. She " appears well-developed and well-nourished. No distress.   HENT:   Head: Normocephalic and atraumatic.   Mouth/Throat: Oropharynx is clear and moist. No oropharyngeal exudate.   Eyes: Conjunctivae are normal. Right eye exhibits no discharge. Left eye exhibits no discharge.   Neck: Normal range of motion. Neck supple.   Cardiovascular: Normal rate, regular rhythm and intact distal pulses. Exam reveals no friction rub.   No murmur heard.  Pulmonary/Chest: Effort normal and breath sounds normal.   Musculoskeletal: Normal range of motion. She exhibits no edema.   Neurological: She is alert and oriented to person, place, and time.   Skin: Skin is warm and dry.   Psychiatric: She has a normal mood and affect. Her behavior is normal.   Vitals reviewed.      Lab Results   Component Value Date    WBC 5.14 03/04/2019    HGB 12.9 03/04/2019    HCT 43.6 03/04/2019     03/04/2019    CHOL 275 (H) 03/04/2019    TRIG 98 03/04/2019    HDL 55 03/04/2019    ALT 14 05/29/2019    AST 18 05/29/2019     05/29/2019    K 4.1 05/29/2019     05/29/2019    CREATININE 0.9 05/29/2019    BUN 16 05/29/2019    CO2 26 05/29/2019    TSH 1.217 05/29/2019    INR 1.0 11/21/2015    HGBA1C 6.7 (H) 03/04/2019       Assessment:     1. Right arm pain    2. Neck pain    3. Bedbug bite, initial encounter      Plan:   Joanne was seen today for arm pain, hand pain and insect bite.    Diagnoses and all orders for this visit:    Right arm pain  -     methocarbamol (ROBAXIN) 500 MG Tab; Take 1 tablet (500 mg total) by mouth nightly as needed. Pt counseled to not take if has to be awake/alert. Not to take with alcohol. Will check neck xray to ensure no herniated disc or osteophyte causing impingement     Neck pain  -     X-Ray Cervical Spine Complete 5 view; Future    Bedbug bite, initial encounter  - Pt counseled to have her home sprayed   -     triamcinolone acetonide 0.1% (KENALOG) 0.1 % cream; Apply topically 2 (two) times daily.      Follow up  if symptoms worsen or fail to improve.    Claudette Villagran MD  Internal Medicine    6/26/2019

## 2019-06-30 ENCOUNTER — PATIENT MESSAGE (OUTPATIENT)
Dept: INTERNAL MEDICINE | Facility: CLINIC | Age: 54
End: 2019-06-30

## 2019-07-12 ENCOUNTER — LAB VISIT (OUTPATIENT)
Dept: LAB | Facility: HOSPITAL | Age: 54
End: 2019-07-12
Attending: INTERNAL MEDICINE
Payer: COMMERCIAL

## 2019-07-12 ENCOUNTER — OFFICE VISIT (OUTPATIENT)
Dept: INTERNAL MEDICINE | Facility: CLINIC | Age: 54
End: 2019-07-12
Payer: COMMERCIAL

## 2019-07-12 VITALS
OXYGEN SATURATION: 98 % | TEMPERATURE: 99 F | DIASTOLIC BLOOD PRESSURE: 64 MMHG | RESPIRATION RATE: 18 BRPM | HEART RATE: 82 BPM | HEIGHT: 57 IN | SYSTOLIC BLOOD PRESSURE: 124 MMHG | WEIGHT: 149.25 LBS | BODY MASS INDEX: 32.2 KG/M2

## 2019-07-12 DIAGNOSIS — M79.89 SWELLING OF RIGHT UPPER EXTREMITY: ICD-10-CM

## 2019-07-12 DIAGNOSIS — M79.18 MYALGIA, OTHER SITE: ICD-10-CM

## 2019-07-12 DIAGNOSIS — E78.5 HYPERLIPIDEMIA, UNSPECIFIED HYPERLIPIDEMIA TYPE: ICD-10-CM

## 2019-07-12 DIAGNOSIS — M79.601 PAIN OF RIGHT UPPER EXTREMITY: Primary | ICD-10-CM

## 2019-07-12 DIAGNOSIS — E11.9 TYPE 2 DIABETES MELLITUS WITHOUT COMPLICATION, WITHOUT LONG-TERM CURRENT USE OF INSULIN: Chronic | ICD-10-CM

## 2019-07-12 LAB
ALBUMIN SERPL BCP-MCNC: 3.6 G/DL (ref 3.5–5.2)
ALP SERPL-CCNC: 86 U/L (ref 55–135)
ALT SERPL W/O P-5'-P-CCNC: 14 U/L (ref 10–44)
ANION GAP SERPL CALC-SCNC: 9 MMOL/L (ref 8–16)
AST SERPL-CCNC: 18 U/L (ref 10–40)
BILIRUB SERPL-MCNC: 0.4 MG/DL (ref 0.1–1)
BUN SERPL-MCNC: 16 MG/DL (ref 6–20)
CALCIUM SERPL-MCNC: 10 MG/DL (ref 8.7–10.5)
CHLORIDE SERPL-SCNC: 104 MMOL/L (ref 95–110)
CHOLEST SERPL-MCNC: 291 MG/DL (ref 120–199)
CHOLEST/HDLC SERPL: 5.1 {RATIO} (ref 2–5)
CO2 SERPL-SCNC: 27 MMOL/L (ref 23–29)
CREAT SERPL-MCNC: 0.9 MG/DL (ref 0.5–1.4)
EST. GFR  (AFRICAN AMERICAN): >60 ML/MIN/1.73 M^2
EST. GFR  (NON AFRICAN AMERICAN): >60 ML/MIN/1.73 M^2
ESTIMATED AVG GLUCOSE: 146 MG/DL (ref 68–131)
GLUCOSE SERPL-MCNC: 138 MG/DL (ref 70–110)
HBA1C MFR BLD HPLC: 6.7 % (ref 4–5.6)
HDLC SERPL-MCNC: 57 MG/DL (ref 40–75)
HDLC SERPL: 19.6 % (ref 20–50)
LDLC SERPL CALC-MCNC: 215 MG/DL (ref 63–159)
NONHDLC SERPL-MCNC: 234 MG/DL
POTASSIUM SERPL-SCNC: 4.1 MMOL/L (ref 3.5–5.1)
PROT SERPL-MCNC: 7.9 G/DL (ref 6–8.4)
SODIUM SERPL-SCNC: 140 MMOL/L (ref 136–145)
TRIGL SERPL-MCNC: 95 MG/DL (ref 30–150)

## 2019-07-12 PROCEDURE — 3008F BODY MASS INDEX DOCD: CPT | Mod: CPTII,S$GLB,, | Performed by: INTERNAL MEDICINE

## 2019-07-12 PROCEDURE — 99214 PR OFFICE/OUTPT VISIT, EST, LEVL IV, 30-39 MIN: ICD-10-PCS | Mod: S$GLB,,, | Performed by: INTERNAL MEDICINE

## 2019-07-12 PROCEDURE — 83036 HEMOGLOBIN GLYCOSYLATED A1C: CPT

## 2019-07-12 PROCEDURE — 80061 LIPID PANEL: CPT

## 2019-07-12 PROCEDURE — 80053 COMPREHEN METABOLIC PANEL: CPT

## 2019-07-12 PROCEDURE — 99999 PR PBB SHADOW E&M-EST. PATIENT-LVL IV: ICD-10-PCS | Mod: PBBFAC,,, | Performed by: INTERNAL MEDICINE

## 2019-07-12 PROCEDURE — 36415 COLL VENOUS BLD VENIPUNCTURE: CPT | Mod: PO

## 2019-07-12 PROCEDURE — 3008F PR BODY MASS INDEX (BMI) DOCUMENTED: ICD-10-PCS | Mod: CPTII,S$GLB,, | Performed by: INTERNAL MEDICINE

## 2019-07-12 PROCEDURE — 99214 OFFICE O/P EST MOD 30 MIN: CPT | Mod: S$GLB,,, | Performed by: INTERNAL MEDICINE

## 2019-07-12 PROCEDURE — 99999 PR PBB SHADOW E&M-EST. PATIENT-LVL IV: CPT | Mod: PBBFAC,,, | Performed by: INTERNAL MEDICINE

## 2019-07-12 RX ORDER — ETODOLAC 400 MG/1
400 TABLET, FILM COATED ORAL EVERY 8 HOURS PRN
Qty: 90 TABLET | Refills: 2 | Status: SHIPPED | OUTPATIENT
Start: 2019-07-12 | End: 2019-09-03

## 2019-07-23 NOTE — PROGRESS NOTES
Subjective:       Patient ID: Joanne Stallings is a 53 y.o. female.    Chief Complaint: Arm Pain (3/10 pain level most of the time, 10 when pain hits. swelling as well.)    HPI   The patient presents with complaints of right upper extremity swelling and pain of 3 weeks duration.  The right hand and forearm are also swollen.  She has not experienced relief with Robaxin or  Ibuprofen.    Blood sugar levels have ranged 130-150.  No hypoglycemia has been noted.  Review of Systems   Constitutional: Negative for activity change and unexpected weight change.   HENT: Negative for hearing loss, rhinorrhea and trouble swallowing.    Eyes: Negative for discharge and visual disturbance.   Respiratory: Negative for chest tightness and wheezing.    Cardiovascular: Negative for chest pain and palpitations.   Gastrointestinal: Negative for blood in stool, constipation, diarrhea and vomiting.   Endocrine: Negative for polydipsia and polyuria.   Genitourinary: Negative for difficulty urinating, dysuria, hematuria and menstrual problem.   Musculoskeletal: Positive for arthralgias, joint swelling and neck pain.   Neurological: Negative for weakness and headaches.   Psychiatric/Behavioral: Negative for confusion and dysphoric mood.       Objective:      Physical Exam   Constitutional: She is oriented to person, place, and time. She appears well-developed and well-nourished. No distress.   HENT:   Head: Normocephalic and atraumatic.   Eyes: Pupils are equal, round, and reactive to light. Conjunctivae and EOM are normal. No scleral icterus.   Neck: Normal range of motion. Neck supple. No JVD present. No thyromegaly present.   Tender right trapezius muscle is present on palpation.  Range of motion is intact.   Cardiovascular: Normal rate, regular rhythm, normal heart sounds and intact distal pulses. Exam reveals no gallop and no friction rub.   No murmur heard.  Pulmonary/Chest: Effort normal and breath sounds normal. No respiratory  distress. She has no wheezes. She has no rales.   Abdominal: Soft. Bowel sounds are normal. She exhibits no mass. There is no tenderness.   Musculoskeletal: Normal range of motion. She exhibits tenderness. She exhibits no edema.   Tenderness is noted of the right deltoid and biceps muscles on palpation.  Shoulder range of motion is intact.   Lymphadenopathy:     She has no cervical adenopathy.   Neurological: She is alert and oriented to person, place, and time. No cranial nerve deficit.   Sensory exam is intact in both feet on monofilament and vibration testing.   Skin: Skin is warm and dry. No rash noted.   No foot lesions are present.   Psychiatric: She has a normal mood and affect. Her behavior is normal.   Nursing note and vitals reviewed.      Assessment:       1. Pain of right upper extremity    2. Swelling of right upper extremity    3. Myalgia, other site        Plan:     Joanne was seen today for arm pain. Blood tests will be obtained today.  Hand surgery consultation will be obtained regarding right upper extremity pain and swelling. Psychiatry consultation will be obtained to address patient's difficulty with anxiety and difficulty concentrating.  Lodine will be ordered for musculoskeletal pain.  The patient is to return to clinic in 4-6 weeks.    Diagnoses and all orders for this visit:    Pain of right upper extremity  -     Ambulatory referral to Hand Surgery  -     CK; Future  -     Magnesium; Future  -     CBC auto differential; Future  -     Sedimentation rate; Future    Swelling of right upper extremity  -     Ambulatory referral to Hand Surgery  -     CK; Future  -     Magnesium; Future  -     CBC auto differential; Future  -     Sedimentation rate; Future    Myalgia, other site  -     CK; Future  -     Magnesium; Future  -     CBC auto differential; Future  -     Sedimentation rate; Future    Other orders  -     etodolac (LODINE) 400 MG tablet; Take 1 tablet (400 mg total) by mouth every 8  (eight) hours as needed.

## 2019-07-24 ENCOUNTER — TELEPHONE (OUTPATIENT)
Dept: ORTHOPEDICS | Facility: CLINIC | Age: 54
End: 2019-07-24

## 2019-07-24 ENCOUNTER — PATIENT OUTREACH (OUTPATIENT)
Dept: ADMINISTRATIVE | Facility: OTHER | Age: 54
End: 2019-07-24

## 2019-07-24 DIAGNOSIS — R52 PAIN: Primary | ICD-10-CM

## 2019-07-24 NOTE — TELEPHONE ENCOUNTER
----- Message from Caty Edge sent at 7/24/2019  9:57 AM CDT -----  Contact: ISABEL TRIPLETT    Type:  Patient Returning Call    Who Called:ISABEL TRIPLETT      Who Left Message for Patient: unknown    Does the patient know what this is regarding? X ray    Best Call Back Number: [phone]    Additional Information: N/A

## 2019-07-25 ENCOUNTER — OFFICE VISIT (OUTPATIENT)
Dept: ORTHOPEDICS | Facility: CLINIC | Age: 54
End: 2019-07-25
Payer: COMMERCIAL

## 2019-07-25 ENCOUNTER — HOSPITAL ENCOUNTER (OUTPATIENT)
Dept: RADIOLOGY | Facility: OTHER | Age: 54
Discharge: HOME | End: 2019-07-25
Attending: ORTHOPAEDIC SURGERY
Payer: COMMERCIAL

## 2019-07-25 VITALS
BODY MASS INDEX: 32.15 KG/M2 | SYSTOLIC BLOOD PRESSURE: 129 MMHG | HEART RATE: 73 BPM | WEIGHT: 149 LBS | HEIGHT: 57 IN | DIASTOLIC BLOOD PRESSURE: 87 MMHG

## 2019-07-25 DIAGNOSIS — R52 PAIN: ICD-10-CM

## 2019-07-25 DIAGNOSIS — M54.12 CERVICAL RADICULOPATHY: Primary | ICD-10-CM

## 2019-07-25 PROCEDURE — 73080 X-RAY EXAM OF ELBOW: CPT | Mod: 26,RT,, | Performed by: RADIOLOGY

## 2019-07-25 PROCEDURE — 99999 PR PBB SHADOW E&M-EST. PATIENT-LVL III: ICD-10-PCS | Mod: PBBFAC,,, | Performed by: ORTHOPAEDIC SURGERY

## 2019-07-25 PROCEDURE — 99214 OFFICE O/P EST MOD 30 MIN: CPT | Mod: S$GLB,,, | Performed by: ORTHOPAEDIC SURGERY

## 2019-07-25 PROCEDURE — 3008F PR BODY MASS INDEX (BMI) DOCUMENTED: ICD-10-PCS | Mod: CPTII,S$GLB,, | Performed by: ORTHOPAEDIC SURGERY

## 2019-07-25 PROCEDURE — 3008F BODY MASS INDEX DOCD: CPT | Mod: CPTII,S$GLB,, | Performed by: ORTHOPAEDIC SURGERY

## 2019-07-25 PROCEDURE — 99214 PR OFFICE/OUTPT VISIT, EST, LEVL IV, 30-39 MIN: ICD-10-PCS | Mod: S$GLB,,, | Performed by: ORTHOPAEDIC SURGERY

## 2019-07-25 PROCEDURE — 73030 X-RAY EXAM OF SHOULDER: CPT | Mod: TC,FY,RT

## 2019-07-25 PROCEDURE — 73080 XR ELBOW COMPLETE 3 VIEW RIGHT: ICD-10-PCS | Mod: 26,RT,, | Performed by: RADIOLOGY

## 2019-07-25 PROCEDURE — 73030 X-RAY EXAM OF SHOULDER: CPT | Mod: 26,RT,, | Performed by: RADIOLOGY

## 2019-07-25 PROCEDURE — 99999 PR PBB SHADOW E&M-EST. PATIENT-LVL III: CPT | Mod: PBBFAC,,, | Performed by: ORTHOPAEDIC SURGERY

## 2019-07-25 PROCEDURE — 73080 X-RAY EXAM OF ELBOW: CPT | Mod: TC,FY,RT

## 2019-07-25 PROCEDURE — 73030 XR SHOULDER TRAUMA 3 VIEW RIGHT: ICD-10-PCS | Mod: 26,RT,, | Performed by: RADIOLOGY

## 2019-07-25 NOTE — PROGRESS NOTES
Pt c/o injury 1 year ago at work. She reached for the door and she felt pain in RUE from shoulder down to fingers. It was a sharp pain but no pop. That was the first event. The pain lasted 1 min or so. Arm did go numb. Next time- pt was driving, reached back and felt a pull in shoulder. Noticed ROM decreased- could not lift arm over head or straight out. Noticed hand was swollen. Went To PCP. Given cortisone shot in shoulder- saw a specialist in [place].This was last summer. Did PT during summer- got ROM back , still achy. Today the pain is tolerable, every now and then has a sharp pain. If overextends painful. + Nighttime symptoms- wakes up at night with burning, pain, tingling . Pain in muscle around neck on right side. Pt is RHD. 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     Exam: NVI distally, FROM fingers, wrist , elbos    Shoulder on right , ER 60, IR limited to L5, unable tyo lift off due to pain  Left FROM  + impingement on right, + cross-body adduction, + TTP over biceps    Plan:   I have explained the risks, benefits, and alternatives of the procedure to the patient in great detail. The patient voices understanding and all questions have been answered. The patient agrees with to proceed as planned. Consents were performed in clinic. Pt has failed conservative treatment for her shoulder. She does not want to try another injection and PT

## 2019-07-25 NOTE — LETTER
July 26, 2019      Alonso Phillips MD  [address] [facility] [address]  [place] [address]           [facility] [address] [facility] [address]  [facility], [address]  [facility] [address]  Phone: [phone]          Patient: Joanne Stallings   MR Number: [id number]   YOB: 1965   Date of Visit: 7/25/2019       Dear Dr. Alonso Phillips:    Thank you for referring Joanne Stallings to me for evaluation. Attached you will find relevant portions of my assessment and plan of care.    If you have questions, please do not hesitate to call me. I look forward to following Joanne Stallings along with you.    Sincerely,    Gia Flores MD    Enclosure  CC:  No Recipients    If you would like to receive this communication electronically, please contact [email or web address] or [phone] to request more information on [employer] Link access.    For providers and/or their staff who would like to refer a patient to [facility], please contact us through our one-stop-shop provider referral line, [facility], at [phone].    If you feel you have received this communication in error or would no longer like to receive these types of communications, please e-mail [email or web address]

## 2019-07-31 ENCOUNTER — PATIENT MESSAGE (OUTPATIENT)
Dept: ORTHOPEDICS | Facility: CLINIC | Age: 54
End: 2019-07-31

## 2019-08-15 ENCOUNTER — TELEPHONE (OUTPATIENT)
Dept: NEUROLOGY | Facility: CLINIC | Age: 54
End: 2019-08-15

## 2019-08-15 ENCOUNTER — PATIENT MESSAGE (OUTPATIENT)
Dept: ORTHOPEDICS | Facility: CLINIC | Age: 54
End: 2019-08-15

## 2019-08-15 NOTE — TELEPHONE ENCOUNTER
Spoke to patient.  I offered her an appointment to have her EMG procedure performed today at 10:30, but she was unable to take it.  She asked that I keep her in mind for future dates.

## 2019-08-17 ENCOUNTER — OFFICE VISIT (OUTPATIENT)
Dept: URGENT CARE | Facility: CLINIC | Age: 54
End: 2019-08-17
Payer: COMMERCIAL

## 2019-08-17 VITALS
DIASTOLIC BLOOD PRESSURE: 89 MMHG | SYSTOLIC BLOOD PRESSURE: 134 MMHG | HEART RATE: 72 BPM | HEIGHT: 57 IN | BODY MASS INDEX: 32.15 KG/M2 | TEMPERATURE: 99 F | WEIGHT: 149 LBS | OXYGEN SATURATION: 100 % | RESPIRATION RATE: 18 BRPM

## 2019-08-17 DIAGNOSIS — L02.419 AXILLARY ABSCESS: Primary | ICD-10-CM

## 2019-08-17 PROCEDURE — 99213 PR OFFICE/OUTPT VISIT, EST, LEVL III, 20-29 MIN: ICD-10-PCS | Mod: S$GLB,,, | Performed by: NURSE PRACTITIONER

## 2019-08-17 PROCEDURE — 99213 OFFICE O/P EST LOW 20 MIN: CPT | Mod: S$GLB,,, | Performed by: NURSE PRACTITIONER

## 2019-08-17 PROCEDURE — 3008F BODY MASS INDEX DOCD: CPT | Mod: CPTII,S$GLB,, | Performed by: NURSE PRACTITIONER

## 2019-08-17 PROCEDURE — 3008F PR BODY MASS INDEX (BMI) DOCUMENTED: ICD-10-PCS | Mod: CPTII,S$GLB,, | Performed by: NURSE PRACTITIONER

## 2019-08-17 RX ORDER — DOXYCYCLINE 100 MG/1
100 CAPSULE ORAL 2 TIMES DAILY
Qty: 20 CAPSULE | Refills: 0 | Status: SHIPPED | OUTPATIENT
Start: 2019-08-17 | End: 2019-08-27

## 2019-08-17 NOTE — PATIENT INSTRUCTIONS
Return to Urgent Care or go to ER if symptoms worsen or fail to improve.  Follow up with PCP as recommended for further management.   Continue warm compresses to the area-- return in 48 hours if abscess pain does not improve for evaluation for need to I & D      Abscess (Antibiotic Treatment Only)  An abscess (sometimes called a boil) happens when bacteria get trapped under the skin and start to grow. Pus forms inside the abscess as the body responds to the bacteria. An abscess can happen with an insect bite, ingrown hair, blocked oil gland, pimple, cyst, or puncture wound.  In the early stages, your wound may be red and tender. For this stage, you may get antibiotics. If the abscess does not get better with antibiotics, it will need to be drained with a small cut.  Home care  These tips will help you care for your abscess at home:  · Soak the wound in hot water or apply hot packs (small towel soaked in hot water) to the area for 20 minutes at a time. Do this 3 to 4 times a day.  · Do not cut, squeeze, or pop the boil yourself.  · Apply antibiotic cream or ointment to the skin 3 to 4 times a day, unless something else was prescribed. Some ointments include an antibiotic plus a pain reliever.  · If your doctor prescribed antibiotics, do not stop taking them until you have finished the medicine or the doctor tells you to stop.  · You may use an over-the-counter pain medicine to control pain, unless another pain medicine was prescribed. If you have chronic liver or kidney disease or ever had a stomach ulcer or gastrointestinal bleeding, talk with your doctor before using these any of these.  Follow-up care  Follow up with your healthcare provider, or as advised. Check your wound each day for the signs of worsening infection listed below.  When to seek medical advice  Get prompt medical attention if any of these occur:  · An increase in redness or swelling  · Red streaks in the skin leading away from the abscess  · An  increase in local pain or swelling  · Fever of 100.4ºF (38ºC) or higher, or as directed by your healthcare provider  · Pus or fluid coming from the abscess  · Boil returns after getting better  Date Last Reviewed: 9/1/2016  © 7238-0221 [employer]. [address] [place] [address]. All rights reserved. This information is not intended as a substitute for professional medical care. Always follow your healthcare professional's instructions.

## 2019-08-17 NOTE — PROGRESS NOTES
"Subjective:       Patient ID: Joanne Stallings is a 53 y.o. female.    Vitals:  height is 4' 9" (1.448 m) and weight is 67.6 kg (149 lb). Her temperature is 98.6 °F (37 °C). Her blood pressure is 134/89 and her pulse is 72. Her respiration is 18 and oxygen saturation is 100%.     Chief Complaint: Abscess    Pt states that she first notice her symptoms in June and gradually worsening . Pt states that she is also having a shoulder scope done on her right shoulder and also has pain from those tendons.     Abscess   Chronicity:  NewProgression Since Onset: gradually worsening  Location:  Shoulder/arm  Associated Symptoms: no fever, no chills  Characteristics: painful, redness and swelling    Pain Scale:  7/10  Treatments Tried:  Warm compresses and warm water soaks  Relieved by:  Warm compresses and warm water soaks  Worsened by:  Nothing      Constitution: Negative for chills, fatigue and fever.   HENT: Negative for congestion and sore throat.    Neck: Negative for painful lymph nodes.   Cardiovascular: Negative for chest pain and leg swelling.   Eyes: Negative for double vision and blurred vision.   Respiratory: Negative for cough and shortness of breath.    Gastrointestinal: Negative for nausea, vomiting and diarrhea.   Genitourinary: Negative for dysuria, frequency, urgency and history of kidney stones.   Musculoskeletal: Positive for pain, joint pain and joint swelling. Negative for muscle cramps and muscle ache.   Skin: Positive for abscess. Negative for color change, pale, rash, erythema and bruising.   Allergic/Immunologic: Negative for seasonal allergies.   Neurological: Negative for dizziness, history of vertigo, light-headedness, passing out and headaches.   Hematologic/Lymphatic: Negative for swollen lymph nodes.   Psychiatric/Behavioral: Negative for nervous/anxious, sleep disturbance and depression. The patient is not nervous/anxious.        Objective:      Physical Exam   Constitutional: She is " oriented to person, place, and time. She appears well-developed and well-nourished.   HENT:   Head: Normocephalic and atraumatic. Head is without abrasion, without contusion and without laceration.   Right Ear: External ear normal.   Left Ear: External ear normal.   Nose: Nose normal.   Mouth/Throat: Oropharynx is clear and moist.   Eyes: Pupils are equal, round, and reactive to light. Conjunctivae, EOM and lids are normal.   Neck: Trachea normal, full passive range of motion without pain and phonation normal. Neck supple.   Cardiovascular: Normal rate, regular rhythm and intact distal pulses.   Pulses:       Radial pulses are 2+ on the right side, and 2+ on the left side.   Pulmonary/Chest: Effort normal. No stridor. No respiratory distress. She has no wheezes.   Musculoskeletal: Normal range of motion.   Neurological: She is alert and oriented to person, place, and time.   Skin: Skin is warm, dry and intact. Capillary refill takes less than 2 seconds. No abrasion, no bruising, no burn, no ecchymosis, no laceration, no lesion and no rash noted. No erythema.   Right axillary abscess-- minimal fluctuance--      not ready for I & D   Psychiatric: She has a normal mood and affect. Her speech is normal and behavior is normal. Judgment and thought content normal. Cognition and memory are normal.   Nursing note and vitals reviewed.      Assessment:       1. Axillary abscess        Plan:         Axillary abscess  Comments:  Right    Other orders  -     doxycycline (VIBRAMYCIN) 100 MG Cap; Take 1 capsule (100 mg total) by mouth 2 (two) times daily. Take with food. for 10 days  Dispense: 20 capsule; Refill: 0      Patient Instructions   Return to Urgent Care or go to ER if symptoms worsen or fail to improve.  Follow up with PCP as recommended for further management.   Continue warm compresses to the area-- return in 48 hours if abscess pain does not improve for evaluation for need to I & D      Abscess (Antibiotic Treatment  Only)  An abscess (sometimes called a boil) happens when bacteria get trapped under the skin and start to grow. Pus forms inside the abscess as the body responds to the bacteria. An abscess can happen with an insect bite, ingrown hair, blocked oil gland, pimple, cyst, or puncture wound.  In the early stages, your wound may be red and tender. For this stage, you may get antibiotics. If the abscess does not get better with antibiotics, it will need to be drained with a small cut.  Home care  These tips will help you care for your abscess at home:  · Soak the wound in hot water or apply hot packs (small towel soaked in hot water) to the area for 20 minutes at a time. Do this 3 to 4 times a day.  · Do not cut, squeeze, or pop the boil yourself.  · Apply antibiotic cream or ointment to the skin 3 to 4 times a day, unless something else was prescribed. Some ointments include an antibiotic plus a pain reliever.  · If your doctor prescribed antibiotics, do not stop taking them until you have finished the medicine or the doctor tells you to stop.  · You may use an over-the-counter pain medicine to control pain, unless another pain medicine was prescribed. If you have chronic liver or kidney disease or ever had a stomach ulcer or gastrointestinal bleeding, talk with your doctor before using these any of these.  Follow-up care  Follow up with your healthcare provider, or as advised. Check your wound each day for the signs of worsening infection listed below.  When to seek medical advice  Get prompt medical attention if any of these occur:  · An increase in redness or swelling  · Red streaks in the skin leading away from the abscess  · An increase in local pain or swelling  · Fever of 100.4ºF (38ºC) or higher, or as directed by your healthcare provider  · Pus or fluid coming from the abscess  · Boil returns after getting better  Date Last Reviewed: 9/1/2016  © 6537-0598 [employer]. [address] [place] [address],  [place] [address]. All rights reserved. This information is not intended as a substitute for professional medical care. Always follow your healthcare professional's instructions.

## 2019-08-22 DIAGNOSIS — M75.41 SHOULDER IMPINGEMENT SYNDROME, RIGHT: Primary | ICD-10-CM

## 2019-09-03 ENCOUNTER — OFFICE VISIT (OUTPATIENT)
Dept: INTERNAL MEDICINE | Facility: CLINIC | Age: 54
End: 2019-09-03
Payer: COMMERCIAL

## 2019-09-03 VITALS
TEMPERATURE: 98 F | SYSTOLIC BLOOD PRESSURE: 130 MMHG | WEIGHT: 150.56 LBS | DIASTOLIC BLOOD PRESSURE: 73 MMHG | HEIGHT: 57 IN | HEART RATE: 88 BPM | BODY MASS INDEX: 32.48 KG/M2

## 2019-09-03 DIAGNOSIS — G89.29 CHRONIC RIGHT SHOULDER PAIN: ICD-10-CM

## 2019-09-03 DIAGNOSIS — L73.2 RIGHT AXILLARY HIDRADENITIS: Primary | ICD-10-CM

## 2019-09-03 DIAGNOSIS — M25.511 CHRONIC RIGHT SHOULDER PAIN: ICD-10-CM

## 2019-09-03 PROCEDURE — 99999 PR PBB SHADOW E&M-EST. PATIENT-LVL III: ICD-10-PCS | Mod: PBBFAC,,, | Performed by: INTERNAL MEDICINE

## 2019-09-03 PROCEDURE — 3008F BODY MASS INDEX DOCD: CPT | Mod: CPTII,S$GLB,, | Performed by: INTERNAL MEDICINE

## 2019-09-03 PROCEDURE — 99212 PR OFFICE/OUTPT VISIT, EST, LEVL II, 10-19 MIN: ICD-10-PCS | Mod: S$GLB,,, | Performed by: INTERNAL MEDICINE

## 2019-09-03 PROCEDURE — 99999 PR PBB SHADOW E&M-EST. PATIENT-LVL III: CPT | Mod: PBBFAC,,, | Performed by: INTERNAL MEDICINE

## 2019-09-03 PROCEDURE — 99212 OFFICE O/P EST SF 10 MIN: CPT | Mod: S$GLB,,, | Performed by: INTERNAL MEDICINE

## 2019-09-03 PROCEDURE — 3008F PR BODY MASS INDEX (BMI) DOCUMENTED: ICD-10-PCS | Mod: CPTII,S$GLB,, | Performed by: INTERNAL MEDICINE

## 2019-09-03 RX ORDER — SULFAMETHOXAZOLE AND TRIMETHOPRIM 800; 160 MG/1; MG/1
1 TABLET ORAL 2 TIMES DAILY
Qty: 20 TABLET | Refills: 0 | Status: SHIPPED | OUTPATIENT
Start: 2019-09-03 | End: 2019-09-13

## 2019-09-09 NOTE — PROGRESS NOTES
Subjective:       Patient ID: Joanne Stallings is a 53 y.o. female.    Chief Complaint: Mass (under right arm)    HPI   The patient presents with a tender mass under the right axilla.  She states this has been present since July 2019.  She has had milder episodes with small nodules under the armpit.    The patient has chronic right shoulder pain. She anticipates having arthroscopic surgery in December 2019.    Review of Systems   Constitutional: Negative for activity change, chills and fever.   Respiratory: Negative for shortness of breath.    Cardiovascular: Negative for chest pain.   Skin: Negative for rash.   Neurological: Negative for dizziness and headaches.       Objective:      Physical Exam   Constitutional: She is oriented to person, place, and time. She appears well-developed and well-nourished.   The patient is complaining of some mild right axillary pain.   HENT:   Head: Normocephalic and atraumatic.   Musculoskeletal: She exhibits no deformity.   Right shoulder range of motion is intact.   Neurological: She is alert and oriented to person, place, and time.   Skin:   A tender non fluctuant area of swelling is noted in the right axilla involving the subcutaneous tissue.  No other localized tenderness is present in the extremity.   Nursing note and vitals reviewed.      Assessment:       1. Right axillary hidradenitis    2. Chronic right shoulder pain        Plan:       Joanne was seen today for mass.  Local heat to the affected area is recommended.  Bactrim will be ordered.  The patient was advised to use ibuprofen as needed for joint pain.  The patient will be referred to surgery if the axillary swelling does not resolve.  The patient is to follow up as needed.    Diagnoses and all orders for this visit:    Right axillary hidradenitis    Chronic right shoulder pain    Other orders  -     sulfamethoxazole-trimethoprim 800-160mg (BACTRIM DS) 800-160 mg Tab; Take 1 tablet by mouth 2 (two) times daily. for  10 days

## 2019-09-16 ENCOUNTER — OFFICE VISIT (OUTPATIENT)
Dept: URGENT CARE | Facility: CLINIC | Age: 54
End: 2019-09-16
Payer: COMMERCIAL

## 2019-09-16 VITALS
HEART RATE: 71 BPM | OXYGEN SATURATION: 99 % | WEIGHT: 150 LBS | RESPIRATION RATE: 18 BRPM | TEMPERATURE: 98 F | SYSTOLIC BLOOD PRESSURE: 126 MMHG | HEIGHT: 57 IN | DIASTOLIC BLOOD PRESSURE: 76 MMHG | BODY MASS INDEX: 32.36 KG/M2

## 2019-09-16 DIAGNOSIS — M62.838 MUSCLE SPASM: ICD-10-CM

## 2019-09-16 DIAGNOSIS — M19.011 OSTEOARTHRITIS OF RIGHT SHOULDER, UNSPECIFIED OSTEOARTHRITIS TYPE: Primary | ICD-10-CM

## 2019-09-16 PROCEDURE — 96372 PR INJECTION,THERAP/PROPH/DIAG2ST, IM OR SUBCUT: ICD-10-PCS | Mod: S$GLB,,, | Performed by: NURSE PRACTITIONER

## 2019-09-16 PROCEDURE — 3008F BODY MASS INDEX DOCD: CPT | Mod: CPTII,S$GLB,, | Performed by: NURSE PRACTITIONER

## 2019-09-16 PROCEDURE — 3008F PR BODY MASS INDEX (BMI) DOCUMENTED: ICD-10-PCS | Mod: CPTII,S$GLB,, | Performed by: NURSE PRACTITIONER

## 2019-09-16 PROCEDURE — 96372 THER/PROPH/DIAG INJ SC/IM: CPT | Mod: S$GLB,,, | Performed by: NURSE PRACTITIONER

## 2019-09-16 PROCEDURE — 99214 PR OFFICE/OUTPT VISIT, EST, LEVL IV, 30-39 MIN: ICD-10-PCS | Mod: 25,S$GLB,, | Performed by: NURSE PRACTITIONER

## 2019-09-16 PROCEDURE — 99214 OFFICE O/P EST MOD 30 MIN: CPT | Mod: 25,S$GLB,, | Performed by: NURSE PRACTITIONER

## 2019-09-16 RX ORDER — METHOCARBAMOL 750 MG/1
750 TABLET, FILM COATED ORAL 3 TIMES DAILY
Qty: 15 TABLET | Refills: 0 | Status: SHIPPED | OUTPATIENT
Start: 2019-09-16 | End: 2019-09-21

## 2019-09-16 RX ORDER — KETOROLAC TROMETHAMINE 30 MG/ML
30 INJECTION, SOLUTION INTRAMUSCULAR; INTRAVENOUS
Status: COMPLETED | OUTPATIENT
Start: 2019-09-16 | End: 2019-09-16

## 2019-09-16 RX ADMIN — KETOROLAC TROMETHAMINE 30 MG: 30 INJECTION, SOLUTION INTRAMUSCULAR; INTRAVENOUS at 11:09

## 2019-09-16 NOTE — PATIENT INSTRUCTIONS
Please drink plenty of fluids.  Please get plenty of rest.  Please return here or go to the Emergency Department for any concerns or worsening of condition.  If you were prescribed a narcotic medication, do not drive or operate heavy equipment or machinery while taking these medications.  If you were not prescribed an anti-inflammatory medication, and if you do not have any history of stomach/intestinal ulcers, or kidney disease, or are not taking a blood thinner such as Coumadin, Plavix, Pradaxa, Eloquis, or Xaralta for example, it is OK to take over the counter Ibuprofen or Advil or Motrin or Aleve as directed.  Do not take these medications on an empty stomach.  Rest, ice, compression and elevation to the affected joint or limb as needed.  Please follow up with your primary care doctor or specialist as needed.    If you  smoke, please stop smoking.      Shoulder Pain with Uncertain Cause  Shoulder pain can have many causes. Pain often comes from the structures that surround the shoulder joint. These are the joint capsule, ligaments, tendons, muscles, and bursa. Pain can also come from cartilage in the joint. Cartilage can become worn out or injured. Its important to know whats causing your pain so the healthcare provider can use the correct treatment. But sometimes its difficult to find the exact cause of shoulder pain. You may need to see a specialist (orthopedist). You may also need special tests such as a CT scan or MRI. The provider may need to use special tools to look inside the joint (arthroscopy).  Shoulder pain can be treated with a sling or a device that keeps your shoulder from moving. You can take an anti-inflammatory medicine such as ibuprofen to ease pain. You may need to do special shoulder exercises. Follow up with a specialist if the pain is severe or doesnt go away after a few weeks.  Home care  Follow these tips when caring for yourself at home:  · If a sling was given to you, leave it in  place for the time advised by your healthcare provider. If you arent sure how long to wear it, ask for advice. If the sling becomes loose, adjust it so that your forearm is level with the ground. Your shoulder should feel well supported.  · Put an ice pack on the injured area for 20 minutes every 1 to 2 hours the first day. You can make your own ice pack by putting ice cubes in a plastic bag. Wrap the bag in a thin towel. Continue with ice packs 3 to 4 times a day for the next 2 days. Then use the pack as needed to ease pain and swelling.  · You may use acetaminophen or ibuprofen to control pain, unless another pain medicine was prescribed. If you have chronic liver or kidney disease, talk with your healthcare provider before using these medicines. Also talk with your provider if youve ever had a stomach ulcer or GI bleeding.  · Shoulder pain may seem worse at night, when there is less to distract you from the pain. If you sleep on your side, try to keep weight off your painful shoulder. Propping pillows behind you may stop you from rolling over onto that shoulder during sleep.   · Shoulder and elbow joints can become stiff if left in a sling for too long. You should start range of motion exercises about 7 to 10 days after the injury. Talk with your provider to find out what type of exercises to do and how soon to start.  · You can take the sling off to shower or bathe.  Follow-up care  Follow up with your healthcare provider if you dont start to get better in the next 5 days.  When to seek medical advice  Call your healthcare provider right away if any of these occur:  · Pain or swelling gets worse or continues for more than a few days  · Your hand or fingers become cold, blue, numb, or tingly  · Large amount of bruising on your shoulder or upper arm  · Difficulty moving your hand or fingers  · Weakness in your hand or fingers  · Your shoulder becomes stiff  · It feels like your shoulder is popping out  · You  are less able to do your daily activities  Date Last Reviewed: 10/1/2016  © 8166-2024 [employer]. [address] [place] [address], [place] [address]. All rights reserved. This information is not intended as a substitute for professional medical care. Always follow your healthcare professional's instructions.

## 2019-09-16 NOTE — PROGRESS NOTES
"Subjective:       Patient ID: Joanne Stallings is a 53 y.o. female.    Vitals:  height is 4' 9" (1.448 m) and weight is 68 kg (150 lb). Her temperature is 98.2 °F (36.8 °C). Her blood pressure is 126/76 and her pulse is 71. Her respiration is 18 and oxygen saturation is 99%.     Chief Complaint: Shoulder Pain (right shoulder)    Patient is already being seen by another doctor for her right shoulder and is scheduled for surgery in December. The pain started in July 2019. Patient reports intermittent sharp pain in right shoulder, stiffness in neck, and shoulder/neck tightening that feels worse today. Denies chest pain, pain radiation, SOB, or N/V/D.    Shoulder Pain    The pain is present in the right shoulder. This is a new problem. The current episode started more than 1 month ago. There has been a history of trauma. The problem occurs constantly. The problem has been gradually worsening. The quality of the pain is described as aching and sharp. The pain is at a severity of 5/10. The pain is moderate. Associated symptoms include joint swelling, a limited range of motion, stiffness and tingling. Pertinent negatives include no fever, headaches, inability to bear weight, itching, joint locking, numbness or visual symptoms. The symptoms are aggravated by lying down. She has tried heat (etodolac) for the symptoms. The treatment provided mild relief. Family history includes arthritis. Her past medical history is significant for diabetes. There is no history of Injuries to Extremity or migraines.       Constitution: Negative for chills, fatigue and fever.   HENT: Negative for facial swelling, facial trauma, congestion and sore throat.    Neck: Positive for neck stiffness. Negative for painful lymph nodes.   Cardiovascular: Negative for chest trauma, chest pain and leg swelling.   Eyes: Negative for eye trauma, double vision and blurred vision.   Respiratory: Negative for cough and shortness of breath.  "   Gastrointestinal: Negative for abdominal trauma, abdominal pain, nausea, vomiting, diarrhea and rectal bleeding.   Genitourinary: Negative for dysuria, frequency, urgency, hematuria, history of kidney stones, missed menses, genital trauma and pelvic pain.   Musculoskeletal: Positive for pain, joint pain, joint swelling, abnormal ROM of joint, muscle cramps and muscle ache. Negative for trauma.   Skin: Negative for color change, pale, rash, wound, abrasion, laceration and bruising.   Allergic/Immunologic: Negative for seasonal allergies.   Neurological: Positive for tingling. Negative for dizziness, history of vertigo, light-headedness, passing out, coordination disturbances, headaches, altered mental status, loss of consciousness and numbness.   Hematologic/Lymphatic: Negative for swollen lymph nodes and history of bleeding disorder.   Psychiatric/Behavioral: Negative for altered mental status, nervous/anxious, sleep disturbance and depression. The patient is not nervous/anxious.        Objective:      Physical Exam   Constitutional: She is oriented to person, place, and time. Vital signs are normal. She appears well-developed and well-nourished. She is active and cooperative. No distress.   HENT:   Head: Normocephalic and atraumatic.   Nose: Nose normal.   Mouth/Throat: Oropharynx is clear and moist and mucous membranes are normal.   Eyes: Conjunctivae and lids are normal.   Neck: Trachea normal, normal range of motion, full passive range of motion without pain and phonation normal. Neck supple.   Cardiovascular: Normal rate, regular rhythm, normal heart sounds, intact distal pulses and normal pulses.   Pulmonary/Chest: Effort normal and breath sounds normal.   Abdominal: Soft. Normal appearance and bowel sounds are normal. She exhibits no abdominal bruit, no pulsatile midline mass and no mass.   Musculoskeletal: She exhibits no edema or deformity.        Right shoulder: She exhibits tenderness, pain and spasm.  She exhibits normal range of motion, no bony tenderness, no swelling, no effusion, no crepitus, no deformity, no laceration, normal pulse and normal strength.        Arms:  Neurological: She is alert and oriented to person, place, and time. She has normal strength and normal reflexes. No sensory deficit.   Reflex Scores:       Tricep reflexes are 2+ on the right side and 2+ on the left side.       Bicep reflexes are 2+ on the right side and 2+ on the left side.       Brachioradialis reflexes are 2+ on the right side and 2+ on the left side.       Patellar reflexes are 2+ on the right side and 2+ on the left side.       Achilles reflexes are 2+ on the right side and 2+ on the left side.  Skin: Skin is warm, dry and intact. She is not diaphoretic.   Psychiatric: She has a normal mood and affect. Her speech is normal and behavior is normal. Judgment and thought content normal. Cognition and memory are normal.   Nursing note and vitals reviewed.      Assessment:       1. Osteoarthritis of right shoulder, unspecified osteoarthritis type    2. Muscle spasm        Plan:         Osteoarthritis of right shoulder, unspecified osteoarthritis type  -     ketorolac injection 30 mg    Muscle spasm  -     methocarbamol (ROBAXIN) 750 MG Tab; Take 1 tablet (750 mg total) by mouth 3 (three) times daily. for 5 days  Dispense: 15 tablet; Refill: 0      Patient Instructions   Please drink plenty of fluids.  Please get plenty of rest.  Please return here or go to the Emergency Department for any concerns or worsening of condition.  If you were prescribed a narcotic medication, do not drive or operate heavy equipment or machinery while taking these medications.  If you were not prescribed an anti-inflammatory medication, and if you do not have any history of stomach/intestinal ulcers, or kidney disease, or are not taking a blood thinner such as Coumadin, Plavix, Pradaxa, Eloquis, or Xaralta for example, it is OK to take over the counter  Ibuprofen or Advil or Motrin or Aleve as directed.  Do not take these medications on an empty stomach.  Rest, ice, compression and elevation to the affected joint or limb as needed.  Please follow up with your primary care doctor or specialist as needed.    If you  smoke, please stop smoking.      Shoulder Pain with Uncertain Cause  Shoulder pain can have many causes. Pain often comes from the structures that surround the shoulder joint. These are the joint capsule, ligaments, tendons, muscles, and bursa. Pain can also come from cartilage in the joint. Cartilage can become worn out or injured. Its important to know whats causing your pain so the healthcare provider can use the correct treatment. But sometimes its difficult to find the exact cause of shoulder pain. You may need to see a specialist (orthopedist). You may also need special tests such as a CT scan or MRI. The provider may need to use special tools to look inside the joint (arthroscopy).  Shoulder pain can be treated with a sling or a device that keeps your shoulder from moving. You can take an anti-inflammatory medicine such as ibuprofen to ease pain. You may need to do special shoulder exercises. Follow up with a specialist if the pain is severe or doesnt go away after a few weeks.  Home care  Follow these tips when caring for yourself at home:  · If a sling was given to you, leave it in place for the time advised by your healthcare provider. If you arent sure how long to wear it, ask for advice. If the sling becomes loose, adjust it so that your forearm is level with the ground. Your shoulder should feel well supported.  · Put an ice pack on the injured area for 20 minutes every 1 to 2 hours the first day. You can make your own ice pack by putting ice cubes in a plastic bag. Wrap the bag in a thin towel. Continue with ice packs 3 to 4 times a day for the next 2 days. Then use the pack as needed to ease pain and swelling.  · You may use  acetaminophen or ibuprofen to control pain, unless another pain medicine was prescribed. If you have chronic liver or kidney disease, talk with your healthcare provider before using these medicines. Also talk with your provider if youve ever had a stomach ulcer or GI bleeding.  · Shoulder pain may seem worse at night, when there is less to distract you from the pain. If you sleep on your side, try to keep weight off your painful shoulder. Propping pillows behind you may stop you from rolling over onto that shoulder during sleep.   · Shoulder and elbow joints can become stiff if left in a sling for too long. You should start range of motion exercises about 7 to 10 days after the injury. Talk with your provider to find out what type of exercises to do and how soon to start.  · You can take the sling off to shower or bathe.  Follow-up care  Follow up with your healthcare provider if you dont start to get better in the next 5 days.  When to seek medical advice  Call your healthcare provider right away if any of these occur:  · Pain or swelling gets worse or continues for more than a few days  · Your hand or fingers become cold, blue, numb, or tingly  · Large amount of bruising on your shoulder or upper arm  · Difficulty moving your hand or fingers  · Weakness in your hand or fingers  · Your shoulder becomes stiff  · It feels like your shoulder is popping out  · You are less able to do your daily activities  Date Last Reviewed: 10/1/2016  © 1461-9203 [employer]. [address] [place] [address], [place] [address]. All rights reserved. This information is not intended as a substitute for professional medical care. Always follow your healthcare professional's instructions.

## 2019-09-24 ENCOUNTER — TELEPHONE (OUTPATIENT)
Dept: ORTHOPEDICS | Facility: CLINIC | Age: 54
End: 2019-09-24

## 2019-10-24 ENCOUNTER — PROCEDURE VISIT (OUTPATIENT)
Dept: NEUROLOGY | Facility: CLINIC | Age: 54
End: 2019-10-24
Payer: COMMERCIAL

## 2019-10-24 ENCOUNTER — PATIENT MESSAGE (OUTPATIENT)
Dept: ORTHOPEDICS | Facility: CLINIC | Age: 54
End: 2019-10-24

## 2019-10-24 DIAGNOSIS — M54.12 CERVICAL RADICULOPATHY: ICD-10-CM

## 2019-10-24 PROCEDURE — 95886 MUSC TEST DONE W/N TEST COMP: CPT | Mod: S$GLB,,, | Performed by: PSYCHIATRY & NEUROLOGY

## 2019-10-24 PROCEDURE — 95913 PR NERVE CONDUCTION STUDY; 13 OR MORE STUDIES: ICD-10-PCS | Mod: S$GLB,,, | Performed by: PSYCHIATRY & NEUROLOGY

## 2019-10-24 PROCEDURE — 95913 NRV CNDJ TEST 13/> STUDIES: CPT | Mod: S$GLB,,, | Performed by: PSYCHIATRY & NEUROLOGY

## 2019-10-24 PROCEDURE — 95886 PR EMG COMPLETE, W/ NERVE CONDUCTION STUDIES, 5+ MUSCLES: ICD-10-PCS | Mod: S$GLB,,, | Performed by: PSYCHIATRY & NEUROLOGY

## 2019-10-25 ENCOUNTER — PATIENT OUTREACH (OUTPATIENT)
Dept: ADMINISTRATIVE | Facility: OTHER | Age: 54
End: 2019-10-25

## 2019-10-28 ENCOUNTER — TELEPHONE (OUTPATIENT)
Dept: ORTHOPEDICS | Facility: CLINIC | Age: 54
End: 2019-10-28

## 2019-10-28 NOTE — TELEPHONE ENCOUNTER
Call placed to patient to remind them of upcoming appointment. Voicemail left due to no answer.     Destiny Riley LPN

## 2019-10-29 ENCOUNTER — PATIENT MESSAGE (OUTPATIENT)
Dept: ORTHOPEDICS | Facility: CLINIC | Age: 54
End: 2019-10-29

## 2019-10-29 ENCOUNTER — DOCUMENTATION ONLY (OUTPATIENT)
Dept: ORTHOPEDICS | Facility: CLINIC | Age: 54
End: 2019-10-29

## 2019-11-05 ENCOUNTER — PATIENT MESSAGE (OUTPATIENT)
Dept: ADMINISTRATIVE | Facility: OTHER | Age: 54
End: 2019-11-05

## 2019-11-05 ENCOUNTER — PATIENT OUTREACH (OUTPATIENT)
Dept: ADMINISTRATIVE | Facility: OTHER | Age: 54
End: 2019-11-05

## 2019-11-06 DIAGNOSIS — E11.9 TYPE 2 DIABETES MELLITUS: ICD-10-CM

## 2019-11-07 ENCOUNTER — DOCUMENTATION ONLY (OUTPATIENT)
Dept: ORTHOPEDICS | Facility: CLINIC | Age: 54
End: 2019-11-07

## 2019-11-07 ENCOUNTER — OFFICE VISIT (OUTPATIENT)
Dept: ORTHOPEDICS | Facility: CLINIC | Age: 54
End: 2019-11-07
Payer: COMMERCIAL

## 2019-11-07 VITALS
HEIGHT: 57 IN | HEART RATE: 72 BPM | BODY MASS INDEX: 32.35 KG/M2 | SYSTOLIC BLOOD PRESSURE: 126 MMHG | DIASTOLIC BLOOD PRESSURE: 80 MMHG | WEIGHT: 149.94 LBS

## 2019-11-07 DIAGNOSIS — G56.03 CARPAL TUNNEL SYNDROME ON BOTH SIDES: ICD-10-CM

## 2019-11-07 DIAGNOSIS — M79.601 PAIN OF RIGHT UPPER EXTREMITY: Primary | ICD-10-CM

## 2019-11-07 PROCEDURE — 99213 PR OFFICE/OUTPT VISIT, EST, LEVL III, 20-29 MIN: ICD-10-PCS | Mod: S$GLB,,, | Performed by: PHYSICIAN ASSISTANT

## 2019-11-07 PROCEDURE — 3008F PR BODY MASS INDEX (BMI) DOCUMENTED: ICD-10-PCS | Mod: CPTII,S$GLB,, | Performed by: PHYSICIAN ASSISTANT

## 2019-11-07 PROCEDURE — 99999 PR PBB SHADOW E&M-EST. PATIENT-LVL III: CPT | Mod: PBBFAC,,, | Performed by: PHYSICIAN ASSISTANT

## 2019-11-07 PROCEDURE — 3008F BODY MASS INDEX DOCD: CPT | Mod: CPTII,S$GLB,, | Performed by: PHYSICIAN ASSISTANT

## 2019-11-07 PROCEDURE — 99213 OFFICE O/P EST LOW 20 MIN: CPT | Mod: S$GLB,,, | Performed by: PHYSICIAN ASSISTANT

## 2019-11-07 PROCEDURE — 99999 PR PBB SHADOW E&M-EST. PATIENT-LVL III: ICD-10-PCS | Mod: PBBFAC,,, | Performed by: PHYSICIAN ASSISTANT

## 2019-11-13 ENCOUNTER — OFFICE VISIT (OUTPATIENT)
Dept: PSYCHIATRY | Facility: CLINIC | Age: 54
End: 2019-11-13
Payer: COMMERCIAL

## 2019-11-13 ENCOUNTER — PATIENT MESSAGE (OUTPATIENT)
Dept: PSYCHIATRY | Facility: CLINIC | Age: 54
End: 2019-11-13

## 2019-11-13 VITALS
WEIGHT: 152.88 LBS | HEART RATE: 71 BPM | DIASTOLIC BLOOD PRESSURE: 67 MMHG | HEIGHT: 57 IN | BODY MASS INDEX: 32.98 KG/M2 | SYSTOLIC BLOOD PRESSURE: 149 MMHG

## 2019-11-13 DIAGNOSIS — F98.8 ATTENTION DEFICIT DISORDER (ADD) WITHOUT HYPERACTIVITY: Primary | ICD-10-CM

## 2019-11-13 PROCEDURE — 99999 PR PBB SHADOW E&M-EST. PATIENT-LVL II: CPT | Mod: PBBFAC,,, | Performed by: PSYCHIATRY & NEUROLOGY

## 2019-11-13 PROCEDURE — 90791 PR PSYCHIATRIC DIAGNOSTIC EVALUATION: ICD-10-PCS | Mod: S$GLB,,, | Performed by: PSYCHIATRY & NEUROLOGY

## 2019-11-13 PROCEDURE — 90791 PSYCH DIAGNOSTIC EVALUATION: CPT | Mod: S$GLB,,, | Performed by: PSYCHIATRY & NEUROLOGY

## 2019-11-13 PROCEDURE — 99999 PR PBB SHADOW E&M-EST. PATIENT-LVL II: ICD-10-PCS | Mod: PBBFAC,,, | Performed by: PSYCHIATRY & NEUROLOGY

## 2019-11-13 RX ORDER — ATOMOXETINE 18 MG/1
18 CAPSULE ORAL DAILY
Qty: 30 CAPSULE | Refills: 3 | Status: SHIPPED | OUTPATIENT
Start: 2019-11-13 | End: 2019-11-15

## 2019-11-14 ENCOUNTER — PATIENT MESSAGE (OUTPATIENT)
Dept: ADMINISTRATIVE | Facility: OTHER | Age: 54
End: 2019-11-14

## 2019-11-15 RX ORDER — METHYLPHENIDATE HYDROCHLORIDE 18 MG/1
18 TABLET ORAL EVERY MORNING
Qty: 30 TABLET | Refills: 0 | Status: SHIPPED | OUTPATIENT
Start: 2019-11-15 | End: 2020-01-07 | Stop reason: DRUGHIGH

## 2019-11-15 NOTE — PROGRESS NOTES
Patient called yesterday to say that the cost of Strattera was too much and requested another med that was more affordable for her ADD. I changed the prescription from Strattera to Concerta 18 mg tabs #30 to be taken 1 tab q am. I left her a message re this matter this am as I tried to reach her by phone and explained the change and reviewed potential side effect of the Concerta. She was again advised she could call if she had difficulties with this change in medicine or had further questions re the change.

## 2019-11-20 ENCOUNTER — PATIENT MESSAGE (OUTPATIENT)
Dept: PSYCHIATRY | Facility: CLINIC | Age: 54
End: 2019-11-20

## 2019-11-21 ENCOUNTER — PATIENT MESSAGE (OUTPATIENT)
Dept: PSYCHIATRY | Facility: CLINIC | Age: 54
End: 2019-11-21

## 2019-11-26 NOTE — PRE-PROCEDURE INSTRUCTIONS
PREOP INSTRUCTIONS:No solid food ,milk or milk products for 8 hours prior to procedure.Clear liquids are allowed up to 2 hours before procedure.Clear liquids are:water,apple juice,pedialyte,gatorade,& jello.Shower instructions as well as directions to the [facility] were given.Patient encouraged to wear loose fitting,comfortable clothing.Medication instructions for pm prior to and am of procedure reviewed.Patient stated an understanding.Patient stated that she doesn't have her ride lined up yet but she is working on it.    Patient denies any side effects or issues with anesthesia or sedation.    Patient does not know arrival time.Explained that this information comes from the surgeon's office and if they haven't heard from them by 2 or 3 pm on Friday to call the office.Patient stated an understanding.

## 2019-11-29 ENCOUNTER — PATIENT MESSAGE (OUTPATIENT)
Dept: ORTHOPEDICS | Facility: CLINIC | Age: 54
End: 2019-11-29

## 2019-11-29 ENCOUNTER — TELEPHONE (OUTPATIENT)
Dept: ORTHOPEDICS | Facility: CLINIC | Age: 54
End: 2019-11-29

## 2019-11-29 NOTE — TELEPHONE ENCOUNTER
Called patient to give her arrival time for surgery on 12/2/19 with Dr. Flores at the [facility]. I left a detailed message explaining that we need her to arrive at 8:15 am and reminded her about being NPO. I also advised that she will need someone to drive her home after surgery and that we cannot let her drive out of the facility herself. Left a call back number for any questions and will send a [facility] message as well.

## 2019-12-01 DIAGNOSIS — M67.919 ROTATOR CUFF DISORDER: ICD-10-CM

## 2019-12-01 RX ORDER — SODIUM CHLORIDE 9 MG/ML
INJECTION, SOLUTION INTRAVENOUS CONTINUOUS
Status: CANCELLED | OUTPATIENT
Start: 2019-12-01

## 2019-12-01 RX ORDER — MUPIROCIN 20 MG/G
OINTMENT TOPICAL
Status: CANCELLED | OUTPATIENT
Start: 2019-12-01

## 2019-12-01 NOTE — H&P
Patient ID: Joanne Stallings is a 54 y.o. female.     Chief Complaint: Results of the Left Hand; Results of the Right Hand; and Results of the Right Shoulder        HPI  Joanne Stallings is a right hand dominant 54 y.o. female presenting today for follow up of recent EMG in preparation for right shoulder surgery.  She was evaluated by Dr. Flores in 7/2019 and scheduled for right shoulder arthroscopy to be performed on 12/2/19; EMG was ordered to evaluate extremity numbness prior to surgery. She reports that she experiences pain and numbness intermittently, the pain is more persistent in the right arm and the finger numbness occurs more in the left.  Recent EMG did show mild bilateral carpal tunnel syndrome; no evidence of brachial plexopathy or cervical radiculopathy.  Patient complains that the injury occurred 1 year ago at work, she was reaching for the door and felt pain in the right upper extremity from the shoulder to the fingers.  She knows decreased range of motion and pain with shoulder motion. She previously underwent a shoulder injection by , also underwent physical therapy for the shoulder.  She reports that her pain is worse at night.  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              Review of patient's allergies indicates:   Allergen Reactions    Iodine Swelling       Other reaction(s): Hives    Shellfish containing products Swelling       Other reaction(s): Hives          Current Medications          Current Outpatient Medications   Medication Sig Dispense Refill    blood sugar diagnostic Strp Test blood sugar once daily 100 strip 3    blood sugar diagnostic Strp To check BG once daily, to use with insurance preferred meter 100 strip 3    blood-glucose meter kit To check BG1 times daily, to use with insurance preferred meter 1 each 3    boric acid (BORIC ACID) vaginal suppository Place 1 each (650 mg total)  "vaginally every evening. 30 suppository 11    diclofenac sodium (PENNSAID) 20 mg/gram /actuation(2 %) sopm Apply 40 mg topically 2 (two) times daily. 1 Bottle 1    lancets Misc 1 each by Misc.(Non-Drug; Combo Route) route once daily. 50 each 11    meclizine (ANTIVERT) 25 mg tablet Take 1 tablet (25 mg total) by mouth 3 (three) times daily as needed. 30 tablet 0    rosuvastatin (CRESTOR) 20 MG tablet Take 1 tablet (20 mg total) by mouth once daily. 90 tablet 3    triamcinolone acetonide 0.1% (KENALOG) 0.1 % cream Apply topically 2 (two) times daily. 28.4 g 0    estradiol (ESTRACE) 0.01 % (0.1 mg/gram) vaginal cream Place 1 g vaginally twice a week. 42.5 g 3    metFORMIN (GLUCOPHAGE) 500 MG tablet Take 1 tablet (500 mg total) by mouth before dinner. 90 tablet 3      No current facility-administered medications for this visit.                  Past Medical History:   Diagnosis Date    Chronic constipation      Depression      Diabetes mellitus      Hyperlipidemia                 Past Surgical History:   Procedure Laterality Date     SECTION        COLONOSCOPY N/A 2016     Procedure: COLONOSCOPY;  Surgeon: Angel Sawyer MD;  Location: [facility] ([address] [place] [address]);  Service: Endoscopy;  Laterality: N/A;    HYSTERECTOMY        ALLIE, ovarires and cervix  remain (fibroids)            Review of Systems:  Review of Systems   Constitution: Negative for chills and fever.   Skin: Negative for rash and suspicious lesions.   Musculoskeletal:        See HPI   Neurological: Negative for dizziness, headaches and light-headedness.   Psychiatric/Behavioral: Negative for depression. The patient is not nervous/anxious.             OBJECTIVE:      PHYSICAL EXAM:  Height: 4' 9" (144.8 cm) Weight: 68 kg (149 lb 14.6 oz)      Vitals:     19 1011   BP: 126/80   Pulse: 72   Weight: 68 kg (149 lb 14.6 oz)   Height: 4' 9" (1.448 m)   PainSc:   3      General     Vitals reviewed.  Constitutional: She is oriented to person, " place, and time. She appears well-developed and well-nourished.   HENT:   Head: Normocephalic and atraumatic.   Neck: Normal range of motion.   Cardiovascular: Normal rate.    Pulmonary/Chest: Effort normal. No respiratory distress.   Neurological: She is alert and oriented to person, place, and time.   Psychiatric: She has a normal mood and affect. Her behavior is normal. Judgment and thought content normal.                  Musculoskeletal:  No scars or edema appreciated.  She is nontender palpation.  Good finger, wrist, and elbow range of motion bilaterally. Decreased right shoulder range of motion compared to left, forward flexion limited to approximately 160°, abduction limited to approximately 90°, ER limited compared to left, IR limited to L1, limited adduction.  Positive impingement on the right, negative on the left.  Neurovascularly intact-good sensation and motor function, good capillary refill, 2+ radial pulses.     RADIOGRAPHS:  Right Shoulder XRay, 7/25/19       FINDINGS:  The bones are intact.  There is no evidence for acute fracture or bone destruction.  Joint spaces are well maintained.  Soft tissues are unremarkable.       Impression       No evidence for acute fracture, bone destruction, or dislocation.      Right shoulder MRI, 9/10/18       FINDINGS:  There is a small amount of articular surface fraying at the insertion of the infraspinatus tendon.  Mild subcortical cystic change noted.  The supraspinatus, subscapularis, and teres minor are intact.  The muscle signal is within normal limits.  No muscle atrophy or volume loss.    The biceps tendon demonstrates normal size, signal, and location.    There is mild AC joint arthropathy.  The acromion is flat, type 1.    The labrum is grossly intact, noting limited evaluation without intra-articular contrast.  No para labral cyst.    No high-grade chondromalacia identified.  No subchondral marrow changes.    No fracture.  No marrow replacement process.   No fluid collections.       Impression       Articular surface fraying of the infraspinatus tendon.  No discrete rotator cuff tear.  Mild AC joint arthropathy.

## 2019-12-02 ENCOUNTER — PATIENT MESSAGE (OUTPATIENT)
Dept: INTERNAL MEDICINE | Facility: CLINIC | Age: 54
End: 2019-12-02

## 2019-12-02 ENCOUNTER — OFFICE VISIT (OUTPATIENT)
Dept: URGENT CARE | Facility: CLINIC | Age: 54
End: 2019-12-02
Payer: COMMERCIAL

## 2019-12-02 ENCOUNTER — HOSPITAL ENCOUNTER (OUTPATIENT)
Facility: HOSPITAL | Age: 54
Discharge: HOME | End: 2019-12-02
Attending: ORTHOPAEDIC SURGERY | Admitting: ORTHOPAEDIC SURGERY
Payer: COMMERCIAL

## 2019-12-02 VITALS
SYSTOLIC BLOOD PRESSURE: 158 MMHG | DIASTOLIC BLOOD PRESSURE: 75 MMHG | RESPIRATION RATE: 18 BRPM | HEIGHT: 57 IN | TEMPERATURE: 98 F | HEART RATE: 68 BPM | WEIGHT: 150 LBS | OXYGEN SATURATION: 98 % | BODY MASS INDEX: 32.36 KG/M2

## 2019-12-02 VITALS
RESPIRATION RATE: 18 BRPM | WEIGHT: 150 LBS | BODY MASS INDEX: 32.36 KG/M2 | OXYGEN SATURATION: 97 % | DIASTOLIC BLOOD PRESSURE: 86 MMHG | HEIGHT: 57 IN | TEMPERATURE: 98 F | HEART RATE: 81 BPM | SYSTOLIC BLOOD PRESSURE: 166 MMHG

## 2019-12-02 DIAGNOSIS — L02.419 AXILLARY ABSCESS: Primary | ICD-10-CM

## 2019-12-02 DIAGNOSIS — M67.919 ROTATOR CUFF DISORDER: ICD-10-CM

## 2019-12-02 PROCEDURE — 99499 NO LOS: ICD-10-PCS | Mod: ,,, | Performed by: ORTHOPAEDIC SURGERY

## 2019-12-02 PROCEDURE — 99499 UNLISTED E&M SERVICE: CPT | Mod: ,,, | Performed by: ORTHOPAEDIC SURGERY

## 2019-12-02 PROCEDURE — 99900035 HC TECH TIME PER 15 MIN (STAT)

## 2019-12-02 PROCEDURE — 94761 N-INVAS EAR/PLS OXIMETRY MLT: CPT

## 2019-12-02 PROCEDURE — 99214 OFFICE O/P EST MOD 30 MIN: CPT | Mod: S$GLB,,, | Performed by: NURSE PRACTITIONER

## 2019-12-02 PROCEDURE — 99214 PR OFFICE/OUTPT VISIT, EST, LEVL IV, 30-39 MIN: ICD-10-PCS | Mod: S$GLB,,, | Performed by: NURSE PRACTITIONER

## 2019-12-02 RX ORDER — ACETAMINOPHEN 500 MG
1000 TABLET ORAL
Status: DISCONTINUED | OUTPATIENT
Start: 2019-12-02 | End: 2019-12-02 | Stop reason: HOSPADM

## 2019-12-02 RX ORDER — CEFAZOLIN SODIUM 1 G/3ML
2 INJECTION, POWDER, FOR SOLUTION INTRAMUSCULAR; INTRAVENOUS
Status: DISCONTINUED | OUTPATIENT
Start: 2019-12-02 | End: 2019-12-02 | Stop reason: HOSPADM

## 2019-12-02 RX ORDER — FENTANYL CITRATE 50 UG/ML
25 INJECTION, SOLUTION INTRAMUSCULAR; INTRAVENOUS EVERY 5 MIN PRN
Status: DISCONTINUED | OUTPATIENT
Start: 2019-12-02 | End: 2019-12-02 | Stop reason: HOSPADM

## 2019-12-02 RX ORDER — MUPIROCIN 20 MG/G
OINTMENT TOPICAL
Status: DISCONTINUED | OUTPATIENT
Start: 2019-12-02 | End: 2019-12-02 | Stop reason: HOSPADM

## 2019-12-02 RX ORDER — DOXYCYCLINE 100 MG/1
100 CAPSULE ORAL 2 TIMES DAILY
Qty: 20 CAPSULE | Refills: 0 | Status: SHIPPED | OUTPATIENT
Start: 2019-12-02 | End: 2019-12-12

## 2019-12-02 RX ORDER — SODIUM CHLORIDE 9 MG/ML
INJECTION, SOLUTION INTRAVENOUS CONTINUOUS
Status: DISCONTINUED | OUTPATIENT
Start: 2019-12-02 | End: 2019-12-02 | Stop reason: HOSPADM

## 2019-12-02 RX ORDER — CELECOXIB 200 MG/1
400 CAPSULE ORAL
Status: DISCONTINUED | OUTPATIENT
Start: 2019-12-02 | End: 2019-12-02 | Stop reason: HOSPADM

## 2019-12-02 RX ORDER — MUPIROCIN 20 MG/G
OINTMENT TOPICAL
Qty: 22 G | Refills: 0 | Status: SHIPPED | OUTPATIENT
Start: 2019-12-02 | End: 2020-10-13 | Stop reason: CLARIF

## 2019-12-02 RX ORDER — MIDAZOLAM HYDROCHLORIDE 1 MG/ML
0.5 INJECTION INTRAMUSCULAR; INTRAVENOUS
Status: DISCONTINUED | OUTPATIENT
Start: 2019-12-02 | End: 2019-12-02 | Stop reason: HOSPADM

## 2019-12-02 NOTE — PROGRESS NOTES
Called into Dr. Flores's OR to notify of draining abscess to right axilla. Resident at bedside to assess.

## 2019-12-02 NOTE — PROGRESS NOTES
Surgery cancelled secondary to abscess to right axilla. Work note given to patient and patient informed she will be contacted by the general surgery office. Patient verbalizes understanding.

## 2019-12-03 ENCOUNTER — PATIENT MESSAGE (OUTPATIENT)
Dept: ORTHOPEDICS | Facility: CLINIC | Age: 54
End: 2019-12-03

## 2019-12-03 NOTE — PATIENT INSTRUCTIONS
Return to Urgent Care or go to ER if symptoms worsen or fail to improve.  Follow up with PCP as recommended for further management.       Abscess (Antibiotic Treatment Only)  An abscess (sometimes called a boil) happens when bacteria get trapped under the skin and start to grow. Pus forms inside the abscess as the body responds to the bacteria. An abscess can happen with an insect bite, ingrown hair, blocked oil gland, pimple, cyst, or puncture wound.  In the early stages, your wound may be red and tender. For this stage, you may get antibiotics. If the abscess does not get better with antibiotics, it will need to be drained with a small cut.  Home care  These tips will help you care for your abscess at home:  · Soak the wound in hot water or apply hot packs (small towel soaked in hot water) to the area for 20 minutes at a time. Do this 3 to 4 times a day.  · Do not cut, squeeze, or pop the boil yourself.  · Apply antibiotic cream or ointment to the skin 3 to 4 times a day, unless something else was prescribed. Some ointments include an antibiotic plus a pain reliever.  · If your doctor prescribed antibiotics, do not stop taking them until you have finished the medicine or the doctor tells you to stop.  · You may use an over-the-counter pain medicine to control pain, unless another pain medicine was prescribed. If you have chronic liver or kidney disease or ever had a stomach ulcer or gastrointestinal bleeding, talk with your doctor before using these any of these.  Follow-up care  Follow up with your healthcare provider, or as advised. Check your wound each day for the signs of worsening infection listed below.  When to seek medical advice  Get prompt medical attention if any of these occur:  · An increase in redness or swelling  · Red streaks in the skin leading away from the abscess  · An increase in local pain or swelling  · Fever of 100.4ºF (38ºC) or higher, or as directed by your healthcare provider  · Pus  or fluid coming from the abscess  · Boil returns after getting better  Date Last Reviewed: 9/1/2016  © 5962-4378 [employer]. [address] [place] [address], [place] [address]. All rights reserved. This information is not intended as a substitute for professional medical care. Always follow your healthcare professional's instructions.

## 2019-12-03 NOTE — PROGRESS NOTES
"Subjective:       Patient ID: Joanne Stallings is a 54 y.o. female.    Vitals:  height is 4' 9" (1.448 m) and weight is 68 kg (150 lb). Her tympanic temperature is 97.7 °F (36.5 °C). Her blood pressure is 166/86 (abnormal) and her pulse is 81. Her respiration is 18 and oxygen saturation is 97%.     Chief Complaint: Abscess    This is a 54 y.o. female who presents today with a chief complaint of   Abscess under right arm. Draining off and on since August.-- seen by me at this Urgent Care for same symptom.  Pt has been on antibiotics and it will get better but never go away. She had a call into General surgery to drain it. Pt is in pain it started to drain again today     Abscess   Chronicity:  RecurrentProgression Since Onset: worsening  Location:  Shoulder/arm  Associated Symptoms: no fever, no chills  Characteristics: draining, painful, redness and swelling    Pain Scale:  4/10  Treatments Tried:  Oral antibiotics, draining/squeezing and warm compresses  Relieved by:  Nothing  Worsened by:  Draining/squeezing      Constitution: Negative for chills, fatigue and fever.   HENT: Negative for congestion and sore throat.    Neck: Negative for painful lymph nodes.   Cardiovascular: Negative for chest pain and leg swelling.   Eyes: Negative for double vision and blurred vision.   Respiratory: Negative for cough and shortness of breath.    Gastrointestinal: Negative for nausea, vomiting and diarrhea.   Genitourinary: Negative for dysuria, frequency, urgency and history of kidney stones.   Musculoskeletal: Positive for pain. Negative for joint pain, joint swelling, muscle cramps and muscle ache.   Skin: Positive for abscess. Negative for color change, pale, rash, erythema and bruising.   Allergic/Immunologic: Negative for seasonal allergies.   Neurological: Negative for dizziness, history of vertigo, light-headedness, passing out and headaches.   Hematologic/Lymphatic: Negative for swollen lymph nodes. "   Psychiatric/Behavioral: Negative for nervous/anxious, sleep disturbance and depression. The patient is not nervous/anxious.        Objective:      Physical Exam   Constitutional: She is oriented to person, place, and time. She appears well-developed and well-nourished.  Non-toxic appearance. She does not have a sickly appearance. She does not appear ill. No distress.   HENT:   Head: Normocephalic and atraumatic. Head is without abrasion, without contusion and without laceration.   Right Ear: External ear normal.   Left Ear: External ear normal.   Nose: Nose normal.   Mouth/Throat: Oropharynx is clear and moist and mucous membranes are normal.   Eyes: Pupils are equal, round, and reactive to light. Conjunctivae, EOM and lids are normal.   Neck: Trachea normal, full passive range of motion without pain and phonation normal. Neck supple.   Cardiovascular: Normal rate, regular rhythm and normal heart sounds.   Pulmonary/Chest: Effort normal and breath sounds normal. No stridor. No respiratory distress.   Musculoskeletal: Normal range of motion.   Neurological: She is alert and oriented to person, place, and time.   Skin: Skin is warm, dry, intact, not diaphoretic, not pale and abscessed (right axilla abscess with 1 cm area of opening with surrounding erythema with extending palpable mass, tenderness to palpation). Capillary refill takes less than 2 seconds. abrasion, burn, bruising, erythema and ecchymosis  Psychiatric: She has a normal mood and affect. Her speech is normal and behavior is normal. Judgment and thought content normal. Cognition and memory are normal.   Nursing note and vitals reviewed.        Assessment:       1. Axillary abscess        Plan:         Axillary abscess  -     doxycycline (VIBRAMYCIN) 100 MG Cap; Take 1 capsule (100 mg total) by mouth 2 (two) times daily. Take with food. for 10 days  Dispense: 20 capsule; Refill: 0      Patient Instructions   Return to Urgent Care or go to ER if symptoms  worsen or fail to improve.  Follow up with PCP as recommended for further management.       Abscess (Antibiotic Treatment Only)  An abscess (sometimes called a boil) happens when bacteria get trapped under the skin and start to grow. Pus forms inside the abscess as the body responds to the bacteria. An abscess can happen with an insect bite, ingrown hair, blocked oil gland, pimple, cyst, or puncture wound.  In the early stages, your wound may be red and tender. For this stage, you may get antibiotics. If the abscess does not get better with antibiotics, it will need to be drained with a small cut.  Home care  These tips will help you care for your abscess at home:  · Soak the wound in hot water or apply hot packs (small towel soaked in hot water) to the area for 20 minutes at a time. Do this 3 to 4 times a day.  · Do not cut, squeeze, or pop the boil yourself.  · Apply antibiotic cream or ointment to the skin 3 to 4 times a day, unless something else was prescribed. Some ointments include an antibiotic plus a pain reliever.  · If your doctor prescribed antibiotics, do not stop taking them until you have finished the medicine or the doctor tells you to stop.  · You may use an over-the-counter pain medicine to control pain, unless another pain medicine was prescribed. If you have chronic liver or kidney disease or ever had a stomach ulcer or gastrointestinal bleeding, talk with your doctor before using these any of these.  Follow-up care  Follow up with your healthcare provider, or as advised. Check your wound each day for the signs of worsening infection listed below.  When to seek medical advice  Get prompt medical attention if any of these occur:  · An increase in redness or swelling  · Red streaks in the skin leading away from the abscess  · An increase in local pain or swelling  · Fever of 100.4ºF (38ºC) or higher, or as directed by your healthcare provider  · Pus or fluid coming from the abscess  · Boil  returns after getting better  Date Last Reviewed: 9/1/2016  © 2966-8310 [employer]. [address] [place] [address], [place] [address]. All rights reserved. This information is not intended as a substitute for professional medical care. Always follow your healthcare professional's instructions.

## 2019-12-04 NOTE — BRIEF OP NOTE
[facility]  Surgery Department  Operative Note    SUMMARY     Date of Procedure: 12/2/2019   Pt was canceled secondary to pt had an axillary abscess. She will be referred to surgery for eval of the abscess.

## 2019-12-05 NOTE — TELEPHONE ENCOUNTER
This pt stated Dr Flores was supposed to put in a referral to General Surgery for an abscess under her arm.  Pt surgery was canceled 12/2/19 due to the abscess.  Pt went to Urgent Care and was put on antibiotic but abscess is still open and can't have surgery until abscess is gone.

## 2019-12-16 ENCOUNTER — PATIENT OUTREACH (OUTPATIENT)
Dept: ADMINISTRATIVE | Facility: OTHER | Age: 54
End: 2019-12-16

## 2019-12-18 ENCOUNTER — PATIENT MESSAGE (OUTPATIENT)
Dept: PLASTIC SURGERY | Facility: CLINIC | Age: 54
End: 2019-12-18

## 2019-12-22 ENCOUNTER — PATIENT MESSAGE (OUTPATIENT)
Dept: PSYCHIATRY | Facility: CLINIC | Age: 54
End: 2019-12-22

## 2020-01-07 ENCOUNTER — OFFICE VISIT (OUTPATIENT)
Dept: PSYCHIATRY | Facility: CLINIC | Age: 55
End: 2020-01-07
Payer: COMMERCIAL

## 2020-01-07 DIAGNOSIS — F98.8 ATTENTION DEFICIT DISORDER (ADD) WITHOUT HYPERACTIVITY: Primary | ICD-10-CM

## 2020-01-07 PROCEDURE — 99213 PR OFFICE/OUTPT VISIT, EST, LEVL III, 20-29 MIN: ICD-10-PCS | Mod: S$GLB,,, | Performed by: PSYCHIATRY & NEUROLOGY

## 2020-01-07 PROCEDURE — 99213 OFFICE O/P EST LOW 20 MIN: CPT | Mod: S$GLB,,, | Performed by: PSYCHIATRY & NEUROLOGY

## 2020-01-07 RX ORDER — METHYLPHENIDATE HYDROCHLORIDE 36 MG/1
36 TABLET ORAL EVERY MORNING
Qty: 30 TABLET | Refills: 0 | Status: SHIPPED | OUTPATIENT
Start: 2020-01-07 | End: 2020-02-04 | Stop reason: SDUPTHER

## 2020-01-07 NOTE — PROGRESS NOTES
Outpatient Psychiatry Follow-Up Visit (MD/NP)    01/07/2020    Clinical Status of Patient:  Outpatient (Ambulatory)    Chief Complaint:  Ms Stallings is a 54 y.o. female who presents today for follow-up of her ADD without hyperacitivity.     Met with patient at the office.      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 She is pleased with her response and has no symptoms of depression, veda, or psychosis. She is in good self control.      Target symptoms:loss of focus    Ms Stallings has a normal gait,and has no tremors or signs of dyskinesia        Compliance:  Pt reports she is taking medications as directed    Side effects:  None at this time    Risk Parameters:  Patient reports no suicidal ideation  Patient reports no homicidal ideation  Patient reports no self-injurious behavior  Patient reports no violent behavior    Patient's Response to Intervention:  The patient's response to intervention is accepting.    Progress Toward Goals and Other Mental Status Changes:  The patient's progress toward goals is good.    Vitals: Most recent vital signs, dated today were not taken due to late arrival and hence not reviewed.     Mental Status Evaluation     Appearance:  Neatly dressed and groomed   Behavior:  cooperative   Speech:  normal   Mood:  upbeat   Affect:  Consistent with mood   Thought Process:  logical   Thought Content:  organized   Sensorium:  Grossly intact   Attention Span & Concentration Good focus   Cognition:  Grossly intact   Insight:  Understands condition   Judgment:  Consistent with conditiion         Diagnosis: ADD without hyperacitivity          Plan:(Medication and Therapy Recommendation)  · Continue Concerta 36 mg q am. I reviewed the side effects of her medicine and advised she could call if she had problems with her medicine or clinical condition.    Additional Notes: Supportive  psychotherapy provided during this session.    Return to Clinic: 2 months.

## 2020-01-14 ENCOUNTER — PATIENT MESSAGE (OUTPATIENT)
Dept: PSYCHIATRY | Facility: CLINIC | Age: 55
End: 2020-01-14

## 2020-01-20 ENCOUNTER — PATIENT MESSAGE (OUTPATIENT)
Dept: OPTOMETRY | Facility: CLINIC | Age: 55
End: 2020-01-20

## 2020-01-22 ENCOUNTER — TELEPHONE (OUTPATIENT)
Dept: OPTOMETRY | Facility: CLINIC | Age: 55
End: 2020-01-22

## 2020-01-22 ENCOUNTER — PATIENT MESSAGE (OUTPATIENT)
Dept: OPTOMETRY | Facility: CLINIC | Age: 55
End: 2020-01-22

## 2020-01-27 ENCOUNTER — PATIENT MESSAGE (OUTPATIENT)
Dept: PSYCHIATRY | Facility: CLINIC | Age: 55
End: 2020-01-27

## 2020-01-29 ENCOUNTER — PATIENT MESSAGE (OUTPATIENT)
Dept: PSYCHIATRY | Facility: CLINIC | Age: 55
End: 2020-01-29

## 2020-01-31 DIAGNOSIS — E11.9 TYPE 2 DIABETES MELLITUS WITHOUT COMPLICATION: ICD-10-CM

## 2020-02-04 RX ORDER — METHYLPHENIDATE HYDROCHLORIDE 36 MG/1
36 TABLET ORAL EVERY MORNING
Qty: 30 TABLET | Refills: 0 | Status: SHIPPED | OUTPATIENT
Start: 2020-02-04 | End: 2020-05-27 | Stop reason: SDUPTHER

## 2020-03-16 ENCOUNTER — PATIENT MESSAGE (OUTPATIENT)
Dept: INTERNAL MEDICINE | Facility: CLINIC | Age: 55
End: 2020-03-16

## 2020-03-17 ENCOUNTER — PATIENT MESSAGE (OUTPATIENT)
Dept: INTERNAL MEDICINE | Facility: CLINIC | Age: 55
End: 2020-03-17

## 2020-03-20 ENCOUNTER — PATIENT MESSAGE (OUTPATIENT)
Dept: INTERNAL MEDICINE | Facility: CLINIC | Age: 55
End: 2020-03-20

## 2020-03-20 ENCOUNTER — OFFICE VISIT (OUTPATIENT)
Dept: URGENT CARE | Facility: CLINIC | Age: 55
End: 2020-03-20
Payer: COMMERCIAL

## 2020-03-20 VITALS
WEIGHT: 150 LBS | RESPIRATION RATE: 20 BRPM | BODY MASS INDEX: 32.36 KG/M2 | HEART RATE: 92 BPM | HEIGHT: 57 IN | OXYGEN SATURATION: 96 % | DIASTOLIC BLOOD PRESSURE: 73 MMHG | TEMPERATURE: 101 F | SYSTOLIC BLOOD PRESSURE: 117 MMHG

## 2020-03-20 DIAGNOSIS — B34.9 ACUTE VIRAL SYNDROME: Primary | ICD-10-CM

## 2020-03-20 LAB
CTP QC/QA: YES
FLUAV AG NPH QL: NEGATIVE
FLUBV AG NPH QL: NEGATIVE

## 2020-03-20 PROCEDURE — 87804 POCT INFLUENZA A/B: ICD-10-PCS | Mod: QW,S$GLB,, | Performed by: FAMILY MEDICINE

## 2020-03-20 PROCEDURE — 99214 PR OFFICE/OUTPT VISIT, EST, LEVL IV, 30-39 MIN: ICD-10-PCS | Mod: 25,S$GLB,, | Performed by: FAMILY MEDICINE

## 2020-03-20 PROCEDURE — 87804 INFLUENZA ASSAY W/OPTIC: CPT | Mod: QW,S$GLB,, | Performed by: FAMILY MEDICINE

## 2020-03-20 PROCEDURE — 99214 OFFICE O/P EST MOD 30 MIN: CPT | Mod: 25,S$GLB,, | Performed by: FAMILY MEDICINE

## 2020-03-20 RX ORDER — PROMETHAZINE HYDROCHLORIDE AND CODEINE PHOSPHATE 6.25; 1 MG/5ML; MG/5ML
5 SOLUTION ORAL EVERY 4 HOURS PRN
Qty: 240 ML | Refills: 0 | Status: SHIPPED | OUTPATIENT
Start: 2020-03-20 | End: 2020-03-30

## 2020-03-20 RX ORDER — BENZONATATE 100 MG/1
100 CAPSULE ORAL EVERY 6 HOURS PRN
Qty: 30 CAPSULE | Refills: 1 | Status: SHIPPED | OUTPATIENT
Start: 2020-03-20 | End: 2020-08-06

## 2020-03-20 NOTE — PROGRESS NOTES
"Subjective:       Patient ID: Joanne Stallings is a 54 y.o. female.    Vitals:  height is 4' 9" (1.448 m) and weight is 68 kg (150 lb). Her tympanic temperature is 101.3 °F (38.5 °C) (abnormal). Her blood pressure is 117/73 and her pulse is 92. Her respiration is 20 and oxygen saturation is 96%.     Chief Complaint: Cough    This is a 54 y.o. female   with Past Medical History:  No date: Chronic constipation  No date: Depression  No date: Diabetes mellitus  No date: Hyperlipidemia   and Past Surgical History:  No date:  SECTION  2016: COLONOSCOPY; N/A      Comment:  Procedure: COLONOSCOPY;  Surgeon: Angel Sawyer MD;                 Location: [facility] ([address] [place] [address]);  Service: Endoscopy;                 Laterality: N/A;  2007: HYSTERECTOMY      Comment:  ALLIE, ovarires and cervix  remain (fibroids)  who presents today with a chief complaint of a cough that began two days ago. She's complaining of fever, chills, congestion and a productive cough. She's been taking tylenol and was just prescribed promethazine to help relieve her symptoms.     Cough   This is a new problem. The current episode started in the past 7 days. The problem has been gradually worsening. The problem occurs every few minutes. The cough is productive of sputum. Associated symptoms include chills and a fever. Pertinent negatives include no ear pain, eye redness, hemoptysis, myalgias, rash, sore throat, shortness of breath or wheezing. Treatments tried: tylenol. The treatment provided mild relief.       Constitution: Positive for appetite change, chills and fever. Negative for sweating and fatigue.   HENT: Positive for congestion. Negative for ear pain, sinus pain, sinus pressure, sore throat and voice change.    Neck: Negative for painful lymph nodes.   Eyes: Negative for eye redness.   Respiratory: Positive for cough and sputum production. Negative for chest tightness, bloody sputum, COPD, shortness of breath, stridor, wheezing and " asthma.    Gastrointestinal: Negative for nausea and vomiting.   Musculoskeletal: Negative for muscle ache.   Skin: Negative for rash.   Allergic/Immunologic: Negative for seasonal allergies and asthma.   Hematologic/Lymphatic: Negative for swollen lymph nodes.       Objective:      Physical Exam   Constitutional: She appears well-developed and well-nourished.   HENT:   Head: Normocephalic and atraumatic.   Mouth/Throat: Posterior oropharyngeal erythema present.   Eyes: Pupils are equal, round, and reactive to light. EOM are normal.   Neck: Normal range of motion. Neck supple.   Cardiovascular: Normal rate, regular rhythm and normal heart sounds.   Pulmonary/Chest: Effort normal and breath sounds normal.   Nursing note and vitals reviewed.    Results for orders placed or performed in visit on 03/20/20   POCT Influenza A/B   Result Value Ref Range    Rapid Influenza A Ag Negative Negative    Rapid Influenza B Ag Negative Negative     Acceptable Yes          Assessment:       1. Acute viral syndrome      Not candidate for COVID-19 testing  Plan:         Acute viral syndrome  -     POCT Influenza A/B  -     benzonatate (TESSALON PERLES) 100 MG capsule; Take 1 capsule (100 mg total) by mouth every 6 (six) hours as needed for Cough.  Dispense: 30 capsule; Refill: 1

## 2020-03-20 NOTE — PATIENT INSTRUCTIONS
"  Viral Syndrome (Adult)  A viral illness may cause a number of symptoms. The symptoms depend on the part of the body that the virus affects. If it settles in your nose, throat, and lungs, it may cause cough, sore throat, congestion, and sometimes headache. If it settles in your stomach and intestinal tract, it may cause vomiting and diarrhea. Sometimes it causes vague symptoms like "aching all over," feeling tired, loss of appetite, or fever.  A viral illness usually lasts 1 to 2 weeks, but sometimes it lasts longer. In some cases, a more serious infection can look like a viral syndrome in the first few days of the illness. You may need another exam and additional tests to know the difference. Watch for the warning signs listed below.  Home care  Follow these guidelines for taking care of yourself at home:  · If symptoms are severe, rest at home for the first 2 to 3 days.  · Stay away from cigarette smoke - both your smoke and the smoke from others.  · You may use over-the-counter acetaminophen or ibuprofen for fever, muscle aching, and headache, unless another medicine was prescribed for this. If you have chronic liver or kidney disease or ever had a stomach ulcer or GI bleeding, talk with your doctor before using these medicines. No one who is younger than 18 and ill with a fever should take aspirin. It may cause severe disease or death.  · Your appetite may be poor, so a light diet is fine. Avoid dehydration by drinking 8 to 12 8-ounce glasses of fluids each day. This may include water; orange juice; lemonade; apple, grape, and cranberry juice; clear fruit drinks; electrolyte replacement and sports drinks; and decaffeinated teas and coffee. If you have been diagnosed with a kidney disease, ask your doctor how much and what types of fluids you should drink to prevent dehydration. If you have kidney disease, drinking too much fluid can cause it build up in the your body and be dangerous to your " health.  · Over-the-counter remedies won't shorten the length of the illness but may be helpful for cough, sore throat; and nasal and sinus congestion. Don't use decongestants if you have high blood pressure.  Follow-up care  Follow up with your healthcare provider if you do not improve over the next week.  Call 911  Get emergency medical care if any of the following occur:  · Convulsion  · Feeling weak, dizzy, or like you are going to faint  · Chest pain, shortness of breath, wheezing, or difficulty breathing  When to seek medical advice  Call your healthcare provider right away if any of these occur:  · Cough with lots of colored sputum (mucus) or blood in your sputum  · Chest pain, shortness of breath, wheezing, or difficulty breathing  · Severe headache; face, neck, or ear pain  · Severe, constant pain in the lower right side of your belly (abdominal)  · Continued vomiting (cant keep liquids down)  · Frequent diarrhea (more than 5 times a day); blood (red or black color) or mucus in diarrhea  · Feeling weak, dizzy, or like you are going to faint  · Extreme thirst  · Fever of 100.4°F (38°C) or higher, or as directed by your healthcare provider  Date Last Reviewed: 9/25/2015  © 3492-7148 [employer]. [address] [place] [address], [place] [address]. All rights reserved. This information is not intended as a substitute for professional medical care. Always follow your healthcare professional's instructions.

## 2020-05-08 ENCOUNTER — PATIENT MESSAGE (OUTPATIENT)
Dept: PSYCHIATRY | Facility: CLINIC | Age: 55
End: 2020-05-08

## 2020-05-27 ENCOUNTER — OFFICE VISIT (OUTPATIENT)
Dept: PSYCHIATRY | Facility: CLINIC | Age: 55
End: 2020-05-27
Payer: COMMERCIAL

## 2020-05-27 DIAGNOSIS — F98.8 ATTENTION DEFICIT DISORDER (ADD) WITHOUT HYPERACTIVITY: Primary | ICD-10-CM

## 2020-05-27 PROCEDURE — 99213 PR OFFICE/OUTPT VISIT, EST, LEVL III, 20-29 MIN: ICD-10-PCS | Mod: 95,,, | Performed by: PSYCHIATRY & NEUROLOGY

## 2020-05-27 PROCEDURE — 99213 OFFICE O/P EST LOW 20 MIN: CPT | Mod: 95,,, | Performed by: PSYCHIATRY & NEUROLOGY

## 2020-05-27 RX ORDER — METHYLPHENIDATE HYDROCHLORIDE 36 MG/1
36 TABLET ORAL EVERY MORNING
Qty: 30 TABLET | Refills: 0 | Status: SHIPPED | OUTPATIENT
Start: 2020-05-27 | End: 2020-08-12 | Stop reason: SDUPTHER

## 2020-05-27 NOTE — PROGRESS NOTES
"Outpatient Psychiatry Follow-Up Visit (MD/NP)    05/27/2020 The patient location is: home  The chief complaint leading to consultation is: ADD    Visit type: audiovisual    Face to Face time with patient: 10"  10 minutes of total time spent on the encounter, which includes face to face time and non-face to face time preparing to see the patient (eg, review of tests), Obtaining and/or reviewing separately obtained history, Documenting clinical information in the electronic or other health record, Independently interpreting results (not separately reported) and communicating results to the patient/family/caregiver, or Care coordination (not separately reported).         Each patient to whom he or she provides medical services by telemedicine is:  (1) informed of the relationship between the physician and patient and the respective role of any other health care provider with respect to management of the patient; and (2) notified that he or she may decline to receive medical services by telemedicine and may withdraw from such care at any time.    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 She ran out of the Concerta, which she feels is a big help to her to stay organized and focused on her goals without feeling overwhelmed. Patient is in good self control and has no thoughts of harming herself.She is able to enjoy her family, and does go out for walks at times. She is dealing productively with the virus pandemic. Patient has no symptoms of veda or psychosis.  Compliance:  good    Side effects: none  Musculoskeletal: patient has no abnormal motor movements of any kind     Risk Parameters:not danger to self " or others at this time.    Patient's Response to Intervention:accepting    Progress Toward Goals and Other Mental Status Changes:good     Vital signs this date were not reviewed.     Mental Status Evaluation     Appearance:  Neatly dressed and groomed   Behavior:  cooperative                             Speech normal   Mood:  anxious   Affect:  anxious   Thought Process:  linear, logical   Thought Content:  organizednormal   Sensorium:  alert and oriented to person, place, time and situation   Attention Span & Concentration able to focus   Cognition:  Grossly intactgrossly intact   Insight:  has awareness of illness   Judgment:  behavior is adequate to circumstances       Diagnosis:    Attention deficit disorder (ADD) without hyperactivity [F98.8]      I reviewed the side effects of the patient's medicines and advised a call if the patient had problems with the medicine or clinical condition.    Plan:(Medication and Therapy Recommendation)  Additional Notes: Patient agrees to continue Concerta 36 mg q am.  Return to Clinic:6 months

## 2020-07-20 ENCOUNTER — PATIENT OUTREACH (OUTPATIENT)
Dept: ADMINISTRATIVE | Facility: OTHER | Age: 55
End: 2020-07-20

## 2020-07-20 NOTE — PROGRESS NOTES
Care Everywhere: updated  Immunization: updated  Health Maintenance: updated  Media Review:   Legacy Review:   Order placed:   Upcoming appts:optometry 7.21.2020

## 2020-07-21 ENCOUNTER — OFFICE VISIT (OUTPATIENT)
Dept: OPTOMETRY | Facility: CLINIC | Age: 55
End: 2020-07-21
Payer: COMMERCIAL

## 2020-07-21 DIAGNOSIS — E11.9 TYPE 2 DIABETES MELLITUS WITHOUT RETINOPATHY: Primary | ICD-10-CM

## 2020-07-21 DIAGNOSIS — Z46.0 FITTING AND ADJUSTMENT OF SPECTACLES AND CONTACT LENSES: Primary | ICD-10-CM

## 2020-07-21 DIAGNOSIS — H52.13 MYOPIA WITH PRESBYOPIA OF BOTH EYES: ICD-10-CM

## 2020-07-21 DIAGNOSIS — H35.411 LATTICE DEGENERATION OF PERIPHERAL RETINA, RIGHT: ICD-10-CM

## 2020-07-21 DIAGNOSIS — H52.4 MYOPIA WITH PRESBYOPIA OF BOTH EYES: ICD-10-CM

## 2020-07-21 PROCEDURE — 92015 DETERMINE REFRACTIVE STATE: CPT | Mod: S$GLB,,, | Performed by: OPTOMETRIST

## 2020-07-21 PROCEDURE — 99999 PR PBB SHADOW E&M-EST. PATIENT-LVL II: ICD-10-PCS | Mod: PBBFAC,,, | Performed by: OPTOMETRIST

## 2020-07-21 PROCEDURE — 92310 CONTACT LENS FITTING OU: CPT | Mod: CSM,S$GLB,, | Performed by: OPTOMETRIST

## 2020-07-21 PROCEDURE — 92014 PR EYE EXAM, EST PATIENT,COMPREHESV: ICD-10-PCS | Mod: S$GLB,,, | Performed by: OPTOMETRIST

## 2020-07-21 PROCEDURE — 99999 PR PBB SHADOW E&M-EST. PATIENT-LVL II: CPT | Mod: PBBFAC,,, | Performed by: OPTOMETRIST

## 2020-07-21 PROCEDURE — 92015 PR REFRACTION: ICD-10-PCS | Mod: S$GLB,,, | Performed by: OPTOMETRIST

## 2020-07-21 PROCEDURE — 92014 COMPRE OPH EXAM EST PT 1/>: CPT | Mod: S$GLB,,, | Performed by: OPTOMETRIST

## 2020-07-21 PROCEDURE — 92310 PR CONTACT LENS FITTING (NO CHANGE): ICD-10-PCS | Mod: CSM,S$GLB,, | Performed by: OPTOMETRIST

## 2020-07-21 NOTE — PROGRESS NOTES
HPI     MANJU 04/2019  Diabetic  Sunday.  Wears contacts, comfort and vision   is good.  Wears OTC +2.25 for near with contacts, seems to work fine.    Patient does not have any glasses and needs an updated RX for new ones.    Not using any drops.       Hemoglobin A1C       Date                     Value               Ref Range             Status                07/12/2019               6.7 (H)             4.0 - 5.6 %           Final                  03/04/2019               6.7 (H)             4.0 - 5.6 %           Final           03/05/2018               6.3 (H)             4.0 - 5.6 %           Final                  Last edited by Carlotta Herbert on 7/21/2020  7:44 AM. (History)            Assessment /Plan     For exam results, see Encounter Report.    Type 2 diabetes mellitus without retinopathy    Lattice degeneration of peripheral retina, right    Myopia with presbyopia of both eyes      1. No diabetic retinopathy, no csme. Return in 1 year for dilated eye exam.  2. No new changes or holes, Monitor condition. Patient to report any changes. RTC 1 year recheck.  3. New Spec Rx given and Contact lens Rx released to pt. Daily wear only advised, with education to risks of extended wear.  Discussed lens care, compliance and solutions. RTC yearly contact lens follow up.   . Different lens options discussed with patient. RTC 1 year full exam.

## 2020-07-22 ENCOUNTER — PATIENT MESSAGE (OUTPATIENT)
Dept: INTERNAL MEDICINE | Facility: CLINIC | Age: 55
End: 2020-07-22

## 2020-07-22 NOTE — TELEPHONE ENCOUNTER
The patient is diabetic and is in a higher risk category.  Her last visit with me was in 7/2019.  She does need an office visit.

## 2020-07-22 NOTE — TELEPHONE ENCOUNTER
See her email.  Should I tell her to get us the forms to fill out and bring for an appt with another dr to handle since you will be out?

## 2020-08-06 ENCOUNTER — PATIENT MESSAGE (OUTPATIENT)
Dept: INTERNAL MEDICINE | Facility: CLINIC | Age: 55
End: 2020-08-06

## 2020-08-06 ENCOUNTER — OFFICE VISIT (OUTPATIENT)
Dept: INTERNAL MEDICINE | Facility: CLINIC | Age: 55
End: 2020-08-06
Payer: COMMERCIAL

## 2020-08-06 VITALS
HEIGHT: 57 IN | BODY MASS INDEX: 33.44 KG/M2 | TEMPERATURE: 98 F | DIASTOLIC BLOOD PRESSURE: 82 MMHG | HEART RATE: 83 BPM | SYSTOLIC BLOOD PRESSURE: 124 MMHG | WEIGHT: 155 LBS

## 2020-08-06 DIAGNOSIS — M79.89 SWELLING OF HAND, UNSPECIFIED LATERALITY: ICD-10-CM

## 2020-08-06 DIAGNOSIS — E11.9 TYPE 2 DIABETES MELLITUS WITHOUT COMPLICATION, WITHOUT LONG-TERM CURRENT USE OF INSULIN: Chronic | ICD-10-CM

## 2020-08-06 DIAGNOSIS — R06.09 DOE (DYSPNEA ON EXERTION): ICD-10-CM

## 2020-08-06 DIAGNOSIS — E78.5 HYPERLIPIDEMIA, UNSPECIFIED HYPERLIPIDEMIA TYPE: ICD-10-CM

## 2020-08-06 DIAGNOSIS — M79.89 SWELLING OF BOTH HANDS: ICD-10-CM

## 2020-08-06 DIAGNOSIS — R06.02 SOB (SHORTNESS OF BREATH): ICD-10-CM

## 2020-08-06 DIAGNOSIS — Z01.84 ANTIBODY RESPONSE EXAM: Primary | ICD-10-CM

## 2020-08-06 DIAGNOSIS — K59.09 CHRONIC CONSTIPATION: ICD-10-CM

## 2020-08-06 DIAGNOSIS — Z00.00 ANNUAL PHYSICAL EXAM: Primary | ICD-10-CM

## 2020-08-06 PROCEDURE — 99999 PR PBB SHADOW E&M-EST. PATIENT-LVL III: ICD-10-PCS | Mod: PBBFAC,,, | Performed by: NURSE PRACTITIONER

## 2020-08-06 PROCEDURE — 99396 PR PREVENTIVE VISIT,EST,40-64: ICD-10-PCS | Mod: S$GLB,,, | Performed by: NURSE PRACTITIONER

## 2020-08-06 PROCEDURE — 99999 PR PBB SHADOW E&M-EST. PATIENT-LVL III: CPT | Mod: PBBFAC,,, | Performed by: NURSE PRACTITIONER

## 2020-08-06 PROCEDURE — 99396 PREV VISIT EST AGE 40-64: CPT | Mod: S$GLB,,, | Performed by: NURSE PRACTITIONER

## 2020-08-06 RX ORDER — METFORMIN HYDROCHLORIDE 500 MG/1
500 TABLET ORAL
Qty: 90 TABLET | Refills: 3 | Status: SHIPPED | OUTPATIENT
Start: 2020-08-06 | End: 2021-12-28 | Stop reason: SDUPTHER

## 2020-08-06 RX ORDER — ROSUVASTATIN CALCIUM 20 MG/1
20 TABLET, COATED ORAL NIGHTLY
Qty: 90 TABLET | Refills: 3 | Status: SHIPPED | OUTPATIENT
Start: 2020-08-06 | End: 2021-12-28 | Stop reason: SDUPTHER

## 2020-08-06 RX ORDER — MELOXICAM 7.5 MG/1
7.5 TABLET ORAL DAILY
Qty: 20 TABLET | Refills: 0 | Status: SHIPPED | OUTPATIENT
Start: 2020-08-06 | End: 2023-04-24

## 2020-08-06 NOTE — PROGRESS NOTES
[facility] Note    Chief Complaint      Chief Complaint   Patient presents with    Annual Exam     History of Present Illness      Joanne Stallings is a 54 y.o. female patient of Dr. Guajardo who is new to me and presents today for annual visit exam.  Patient feeling okay, denies chest pain, reviewed meds and history patient.  Patient does complain having intermittent shortness of breath for the last 3 months, and hand swelling and pain, patient thinks possible arthritis or carpal tunnel.  Discussed with patient legal standpoint work release due to COVID in her diabetes.  Discussed healthy diet, exercise, supplements over-the-counter, for PPE and social distancing.  Last A1c-6.7%    Problem List Items Addressed This Visit        Cardiac/Vascular    Hyperlipidemia    Relevant Medications    metFORMIN (GLUCOPHAGE) 500 MG tablet    rosuvastatin (CRESTOR) 20 MG tablet    Other Relevant Orders    CBC auto differential    Comprehensive metabolic panel    TSH    T4, free    Lipid Panel    URINALYSIS       Endocrine    Type 2 diabetes mellitus (Chronic)    Relevant Medications    metFORMIN (GLUCOPHAGE) 500 MG tablet    rosuvastatin (CRESTOR) 20 MG tablet    Other Relevant Orders    CBC auto differential    Comprehensive metabolic panel    TSH    T4, free    Lipid Panel    URINALYSIS       GI    Chronic constipation    Relevant Orders    CBC auto differential    Comprehensive metabolic panel    TSH    T4, free    Lipid Panel    URINALYSIS      Other Visit Diagnoses     Annual physical exam    -  Primary    Relevant Orders    CBC auto differential    Comprehensive metabolic panel    TSH    T4, free    Lipid Panel    URINALYSIS    SOB (shortness of breath)        Relevant Orders    X-Ray Chest PA And Lateral    URINALYSIS    ISAACS (dyspnea on exertion)        Relevant Orders    X-Ray Chest PA And Lateral    URINALYSIS    Swelling of hand, unspecified laterality        Relevant Orders    URINALYSIS    Swelling  of both hands        Relevant Medications    meloxicam (MOBIC) 7.5 MG tablet    Other Relevant Orders    URINALYSIS          Health Maintenance   Topic Date Due    Pneumococcal Vaccine (Medium Risk) (1 of 1 - PPSV23) 10/13/1984    Foot Exam  2017    TETANUS VACCINE  12/10/2018    Urine Microalbumin  2019    Hemoglobin A1c  2020    Lipid Panel  2020    Mammogram  2021    Eye Exam  2021    Low Dose Statin  2021    Hepatitis C Screening  Completed       Past Medical History:   Diagnosis Date    Chronic constipation     Depression     Diabetes mellitus     Hyperlipidemia        Past Surgical History:   Procedure Laterality Date     SECTION      COLONOSCOPY N/A 2016    Procedure: COLONOSCOPY;  Surgeon: Angel Sawyer MD;  Location: [facility] ([address] [place] [address]);  Service: Endoscopy;  Laterality: N/A;    HYSTERECTOMY      ALLIE, ovarires and cervix  remain (fibroids)       family history includes Diabetes in her mother; Glaucoma in her maternal grandmother and mother; Hyperlipidemia in her mother; Hypertension in her mother; Lung cancer in her father.    Social History     Tobacco Use    Smoking status: Never Smoker    Smokeless tobacco: Never Used   Substance Use Topics    Alcohol use: Yes     Comment: occasional use only/ not weekly    Drug use: No       Review of Systems   Constitutional: Negative for chills and fever.   HENT: Negative for congestion, sinus pain and sore throat.    Eyes: Negative for blurred vision.   Respiratory: Positive for shortness of breath. Negative for cough and wheezing.    Cardiovascular: Negative for chest pain, palpitations and leg swelling.   Gastrointestinal: Negative for abdominal pain, constipation, diarrhea, nausea and vomiting.   Genitourinary: Negative for dysuria.   Musculoskeletal: Negative for myalgias.   Skin: Negative for rash.        Intermittent swelling in hands.   Neurological: Negative for dizziness, weakness  and headaches.   Psychiatric/Behavioral: Negative for depression. The patient is not nervous/anxious.         Outpatient Encounter Medications as of 8/6/2020   Medication Sig Note Dispense Refill    blood sugar diagnostic Strp Test blood sugar once daily  100 strip 3    blood sugar diagnostic Strp To check BG once daily, to use with insurance preferred meter  100 strip 3    diclofenac sodium (PENNSAID) 20 mg/gram /actuation(2 %) sopm Apply 40 mg topically 2 (two) times daily. 11/26/2019: Hold am of surgery 1 Bottle 1    lancets Misc 1 each by Misc.(Non-Drug; Combo Route) route once daily.  50 each 11    mupirocin (BACTROBAN) 2 % ointment Apply to affected area 3 times daily  22 g 0    triamcinolone acetonide 0.1% (KENALOG) 0.1 % cream Apply topically 2 (two) times daily. 11/26/2019: Hold am of surgery 28.4 g 0    blood-glucose meter kit To check BG1 times daily, to use with insurance preferred meter  1 each 3    meloxicam (MOBIC) 7.5 MG tablet Take 1 tablet (7.5 mg total) by mouth once daily.  20 tablet 0    metFORMIN (GLUCOPHAGE) 500 MG tablet Take 1 tablet (500 mg total) by mouth before dinner.  90 tablet 3    methylphenidate HCl 36 MG CR tablet Take 1 tablet (36 mg total) by mouth every morning.  30 tablet 0    rosuvastatin (CRESTOR) 20 MG tablet Take 1 tablet (20 mg total) by mouth every evening.  90 tablet 3    [DISCONTINUED] benzonatate (TESSALON PERLES) 100 MG capsule Take 1 capsule (100 mg total) by mouth every 6 (six) hours as needed for Cough.  30 capsule 1    [DISCONTINUED] meclizine (ANTIVERT) 25 mg tablet Take 1 tablet (25 mg total) by mouth 3 (three) times daily as needed. 11/26/2019: Currently not taking 30 tablet 0    [DISCONTINUED] metFORMIN (GLUCOPHAGE) 500 MG tablet Take 1 tablet (500 mg total) by mouth before dinner. 11/26/2019: Hold pm before surgery 90 tablet 3    [DISCONTINUED] rosuvastatin (CRESTOR) 20 MG tablet Take 1 tablet (20 mg total) by mouth once daily. 11/26/2019: Take  "as prescribed pm before procedure   90 tablet 3     No facility-administered encounter medications on file as of 8/6/2020.         Review of patient's allergies indicates:   Allergen Reactions    Iodine Hives and Swelling    Shellfish containing products Hives and Swelling       Physical Exam      Vital Signs  Temp: 97.5 °F (36.4 °C)  Temp src: Temporal  Pulse: 83  BP: 124/82  BP Location: Left arm  Patient Position: Sitting  Pain Score: 0-No pain  Height and Weight  Height: 4' 9" (144.8 cm)  Weight: 70.3 kg (154 lb 15.7 oz)  BSA (Calculated - sq m): 1.68 sq meters  BMI (Calculated): 33.5  Weight in (lb) to have BMI = 25: 115.3]    Physical Exam  Nursing note reviewed.   Constitutional:       Appearance: Normal appearance. She is well-developed.   HENT:      Head: Normocephalic and atraumatic.      Right Ear: External ear normal.      Left Ear: External ear normal.   Eyes:      Conjunctiva/sclera: Conjunctivae normal.      Pupils: Pupils are equal, round, and reactive to light.   Neck:      Musculoskeletal: Normal range of motion and neck supple.      Thyroid: No thyromegaly.      Vascular: No JVD.      Trachea: No tracheal deviation.   Cardiovascular:      Rate and Rhythm: Normal rate and regular rhythm.      Heart sounds: Normal heart sounds. No murmur.   Pulmonary:      Effort: Pulmonary effort is normal.      Breath sounds: Normal breath sounds.   Abdominal:      General: Bowel sounds are normal.      Palpations: Abdomen is soft.   Musculoskeletal: Normal range of motion.   Lymphadenopathy:      Cervical: No cervical adenopathy.   Skin:     General: Skin is warm and dry.   Neurological:      Mental Status: She is alert and oriented to person, place, and time.   Psychiatric:         Behavior: Behavior normal.         Thought Content: Thought content normal.         Judgment: Judgment normal.          Laboratory:  CBC:  No results for input(s): WBC, RBC, HGB, HCT, PLT, MCV, MCH, MCHC in the last 2160 " hours.  CMP:  No results for input(s): GLU, CALCIUM, ALBUMIN, PROT, NA, K, CO2, CL, BUN, ALKPHOS, ALT, AST, BILITOT in the last 2160 hours.    Invalid input(s): CREATININ  URINALYSIS:  No results for input(s): COLORU, CLARITYU, SPECGRAV, PHUR, PROTEINUA, GLUCOSEU, BILIRUBINCON, BLOODU, WBCU, RBCU, BACTERIA, MUCUS, NITRITE, LEUKOCYTESUR, UROBILINOGEN, HYALINECASTS in the last 2160 hours.   LIPIDS:  No results for input(s): TSH, HDL, CHOL, TRIG, LDLCALC, CHOLHDL, NONHDLCHOL, TOTALCHOLEST in the last 2160 hours.  TSH:  No results for input(s): TSH in the last 2160 hours.  A1C:  No results for input(s): HGBA1C in the last 2160 hours.      Assessment/Plan     Joanne Stallings is a 54 y.o.female with:    1. Annual physical exam  - CBC auto differential; Future  - Comprehensive metabolic panel; Future  - TSH; Future  - T4, free; Future  - Lipid Panel; Future  - URINALYSIS; Future    2. Hyperlipidemia, unspecified hyperlipidemia type  - CBC auto differential; Future  - Comprehensive metabolic panel; Future  - TSH; Future  - T4, free; Future  - Lipid Panel; Future  - URINALYSIS; Future  - metFORMIN (GLUCOPHAGE) 500 MG tablet; Take 1 tablet (500 mg total) by mouth before dinner.  Dispense: 90 tablet; Refill: 3  - rosuvastatin (CRESTOR) 20 MG tablet; Take 1 tablet (20 mg total) by mouth every evening.  Dispense: 90 tablet; Refill: 3    3. Type 2 diabetes mellitus without complication, without long-term current use of insulin  - CBC auto differential; Future  - Comprehensive metabolic panel; Future  - TSH; Future  - T4, free; Future  - Lipid Panel; Future  - URINALYSIS; Future  - metFORMIN (GLUCOPHAGE) 500 MG tablet; Take 1 tablet (500 mg total) by mouth before dinner.  Dispense: 90 tablet; Refill: 3  - rosuvastatin (CRESTOR) 20 MG tablet; Take 1 tablet (20 mg total) by mouth every evening.  Dispense: 90 tablet; Refill: 3    4. Chronic constipation  - CBC auto differential; Future  - Comprehensive metabolic panel; Future  -  TSH; Future  - T4, free; Future  - Lipid Panel; Future  - URINALYSIS; Future    5. SOB (shortness of breath)  - X-Ray Chest PA And Lateral; Future  - URINALYSIS; Future    6. ISAACS (dyspnea on exertion)  - X-Ray Chest PA And Lateral; Future  - URINALYSIS; Future    7. Swelling of hand, unspecified laterality  - URINALYSIS; Future    8. Swelling of both hands  - meloxicam (MOBIC) 7.5 MG tablet; Take 1 tablet (7.5 mg total) by mouth once daily.  Dispense: 20 tablet; Refill: 0  - URINALYSIS; Future      -Continue current medications and maintain follow up with specialists.  Return to clinic in 6 months with Dr. Phillips, or sooner for any concerns.       Erin Jiminez, NP-C [facility]

## 2020-08-11 ENCOUNTER — HOSPITAL ENCOUNTER (OUTPATIENT)
Dept: RADIOLOGY | Facility: HOSPITAL | Age: 55
Discharge: HOME | End: 2020-08-11
Attending: NURSE PRACTITIONER
Payer: COMMERCIAL

## 2020-08-11 DIAGNOSIS — R06.02 SOB (SHORTNESS OF BREATH): ICD-10-CM

## 2020-08-11 DIAGNOSIS — R06.09 DOE (DYSPNEA ON EXERTION): ICD-10-CM

## 2020-08-11 PROCEDURE — 71046 X-RAY EXAM CHEST 2 VIEWS: CPT | Mod: TC,FY

## 2020-08-11 PROCEDURE — 71046 XR CHEST PA AND LATERAL: ICD-10-PCS | Mod: 26,,, | Performed by: RADIOLOGY

## 2020-08-11 PROCEDURE — 71046 X-RAY EXAM CHEST 2 VIEWS: CPT | Mod: 26,,, | Performed by: RADIOLOGY

## 2020-08-12 ENCOUNTER — PATIENT MESSAGE (OUTPATIENT)
Dept: INTERNAL MEDICINE | Facility: CLINIC | Age: 55
End: 2020-08-12

## 2020-08-12 RX ORDER — METHYLPHENIDATE HYDROCHLORIDE 36 MG/1
36 TABLET ORAL EVERY MORNING
Qty: 30 TABLET | Refills: 0 | Status: SHIPPED | OUTPATIENT
Start: 2020-08-12 | End: 2020-11-27 | Stop reason: SDUPTHER

## 2020-08-12 NOTE — TELEPHONE ENCOUNTER
covid antibody positive  Total cholesterol has decreased from 291 down to 266  LDL-1 of bad cholesterol is has also decreased from 02/15 down to 195.2, rest of cholesterol panel normal  Thyroid level normal  Electrolytes normal  Kidney and liver function normal  Done infection seen  No anemia  A1c no change from last year level is 6.7%  Urinalysis with no infection, trace blood-noted within range to on microscopic urinalysis

## 2020-08-22 DIAGNOSIS — E11.9 TYPE 2 DIABETES MELLITUS WITHOUT COMPLICATION: ICD-10-CM

## 2020-09-25 ENCOUNTER — PATIENT MESSAGE (OUTPATIENT)
Dept: INTERNAL MEDICINE | Facility: CLINIC | Age: 55
End: 2020-09-25

## 2020-09-25 DIAGNOSIS — L02.419 AXILLARY ABSCESS: Primary | ICD-10-CM

## 2020-09-25 NOTE — TELEPHONE ENCOUNTER
Pt has a reoccurring boil that never fully goes away.Does she need a referral from you to see a general surgeon or can she make an appointment herself, if so can you recommend a doctor?

## 2020-09-28 ENCOUNTER — OFFICE VISIT (OUTPATIENT)
Dept: SURGERY | Facility: CLINIC | Age: 55
End: 2020-09-28
Payer: COMMERCIAL

## 2020-09-28 VITALS
DIASTOLIC BLOOD PRESSURE: 87 MMHG | WEIGHT: 150.25 LBS | HEIGHT: 57 IN | HEART RATE: 72 BPM | BODY MASS INDEX: 32.41 KG/M2 | SYSTOLIC BLOOD PRESSURE: 145 MMHG

## 2020-09-28 DIAGNOSIS — L02.419 AXILLARY ABSCESS: Primary | ICD-10-CM

## 2020-09-28 PROCEDURE — 99203 OFFICE O/P NEW LOW 30 MIN: CPT | Mod: S$GLB,,, | Performed by: STUDENT IN AN ORGANIZED HEALTH CARE EDUCATION/TRAINING PROGRAM

## 2020-09-28 PROCEDURE — 99999 PR PBB SHADOW E&M-EST. PATIENT-LVL V: ICD-10-PCS | Mod: PBBFAC,,, | Performed by: STUDENT IN AN ORGANIZED HEALTH CARE EDUCATION/TRAINING PROGRAM

## 2020-09-28 PROCEDURE — 99999 PR PBB SHADOW E&M-EST. PATIENT-LVL V: CPT | Mod: PBBFAC,,, | Performed by: STUDENT IN AN ORGANIZED HEALTH CARE EDUCATION/TRAINING PROGRAM

## 2020-09-28 PROCEDURE — 99203 PR OFFICE/OUTPT VISIT, NEW, LEVL III, 30-44 MIN: ICD-10-PCS | Mod: S$GLB,,, | Performed by: STUDENT IN AN ORGANIZED HEALTH CARE EDUCATION/TRAINING PROGRAM

## 2020-09-28 PROCEDURE — 3008F BODY MASS INDEX DOCD: CPT | Mod: CPTII,S$GLB,, | Performed by: STUDENT IN AN ORGANIZED HEALTH CARE EDUCATION/TRAINING PROGRAM

## 2020-09-28 PROCEDURE — 3008F PR BODY MASS INDEX (BMI) DOCUMENTED: ICD-10-PCS | Mod: CPTII,S$GLB,, | Performed by: STUDENT IN AN ORGANIZED HEALTH CARE EDUCATION/TRAINING PROGRAM

## 2020-09-28 RX ORDER — AMOXICILLIN AND CLAVULANATE POTASSIUM 500; 125 MG/1; MG/1
1 TABLET, FILM COATED ORAL 2 TIMES DAILY
Qty: 42 TABLET | Refills: 0 | Status: ON HOLD | OUTPATIENT
Start: 2020-09-28 | End: 2020-10-20 | Stop reason: HOSPADM

## 2020-09-28 NOTE — LETTER
September 28, 2020      Alonso Phillips MD  [address]  [place] [address]           [facility]  [address]  [facility] [address]  Phone: [phone]          Patient: Joanne Stallings   MR Number: [id number]   YOB: 1965   Date of Visit: 9/28/2020       Dear Dr. Alonso Phillips:    Thank you for referring Joanne Stallings to me for evaluation. Attached you will find relevant portions of my assessment and plan of care.    If you have questions, please do not hesitate to call me. I look forward to following Joanne Stallings along with you.    Sincerely,    Ted Baumann MD    Enclosure  CC:  No Recipients    If you would like to receive this communication electronically, please contact [email or web address] or [phone] to request more information on [employer] access.    For providers and/or their staff who would like to refer a patient to [facility], please contact us through our one-stop-shop provider referral line, [facility] , at [phone].    If you feel you have received this communication in error or would no longer like to receive these types of communications, please e-mail [email or web address]

## 2020-09-28 NOTE — H&P (VIEW-ONLY)
Patient ID: Joanne Stallings is a 54 y.o. female.    Chief Complaint: No chief complaint on file.      HPI:  54F with recurrent pain, swelling drainage from right axilla. Never had before about a year ago. Will drain then feel better and have no symptoms for months. Then returns. Never had I&D. Been on a few courses of abx. No fevers. Would drain pus like material per patient then turn bloody and dry up. Some pain last week but improved over the past 2 days, minimal pain now.      Review of Systems   Constitutional: Negative for fever.   HENT: Negative for trouble swallowing.    Respiratory: Negative for shortness of breath.    Cardiovascular: Negative for chest pain.   Gastrointestinal: Negative for abdominal pain, blood in stool, nausea and vomiting.   Genitourinary: Negative for dysuria.   Musculoskeletal: Negative for gait problem.   Skin: Negative for rash and wound.   Allergic/Immunologic: Negative for immunocompromised state.   Neurological: Negative for weakness.   Hematological: Does not bruise/bleed easily.   Psychiatric/Behavioral: Negative for agitation.       Current Outpatient Medications   Medication Sig Dispense Refill    blood sugar diagnostic Strp Test blood sugar once daily 100 strip 3    blood sugar diagnostic Strp To check BG once daily, to use with insurance preferred meter 100 strip 3    diclofenac sodium (PENNSAID) 20 mg/gram /actuation(2 %) sopm Apply 40 mg topically 2 (two) times daily. 1 Bottle 1    lancets Misc 1 each by Misc.(Non-Drug; Combo Route) route once daily. 50 each 11    meloxicam (MOBIC) 7.5 MG tablet Take 1 tablet (7.5 mg total) by mouth once daily. 20 tablet 0    mupirocin (BACTROBAN) 2 % ointment Apply to affected area 3 times daily 22 g 0    rosuvastatin (CRESTOR) 20 MG tablet Take 1 tablet (20 mg total) by mouth every evening. 90 tablet 3    triamcinolone acetonide 0.1% (KENALOG) 0.1 % cream Apply topically 2 (two) times daily. 28.4 g 0    blood-glucose meter  kit To check BG1 times daily, to use with insurance preferred meter 1 each 3    metFORMIN (GLUCOPHAGE) 500 MG tablet Take 1 tablet (500 mg total) by mouth before dinner. 90 tablet 3    methylphenidate HCl 36 MG CR tablet Take 1 tablet (36 mg total) by mouth every morning. 30 tablet 0     No current facility-administered medications for this visit.        Review of patient's allergies indicates:   Allergen Reactions    Iodine Hives and Swelling    Shellfish containing products Hives and Swelling       Past Medical History:   Diagnosis Date    Chronic constipation     Depression     Diabetes mellitus     Hyperlipidemia        Past Surgical History:   Procedure Laterality Date     SECTION      COLONOSCOPY N/A 2016    Procedure: COLONOSCOPY;  Surgeon: Angel Sawyer MD;  Location: [facility] ([address] [place] [address]);  Service: Endoscopy;  Laterality: N/A;    HYSTERECTOMY      ALLIE, ovarires and cervix  remain (fibroids)       Family History   Problem Relation Age of Onset    Diabetes Mother     Hypertension Mother     Hyperlipidemia Mother     Glaucoma Mother     Lung cancer Father         lung    Glaucoma Maternal Grandmother     Breast cancer Neg Hx     Colon cancer Neg Hx     Ovarian cancer Neg Hx     Cataracts Neg Hx     Macular degeneration Neg Hx     Retinal detachment Neg Hx     Strabismus Neg Hx     Blindness Neg Hx     Amblyopia Neg Hx        Social History     Socioeconomic History    Marital status: Single     Spouse name: Not on file    Number of children: Not on file    Years of education: Not on file    Highest education level: Not on file   Occupational History    Not on file   Social Needs    Financial resource strain: Not on file    Food insecurity     Worry: Not on file     Inability: Not on file    Transportation needs     Medical: Not on file     Non-medical: Not on file   Tobacco Use    Smoking status: Never Smoker    Smokeless tobacco: Never Used   Substance and  Sexual Activity    Alcohol use: Yes     Comment: occasional use only/ not weekly    Drug use: No    Sexual activity: Yes     Partners: Male     Birth control/protection: Surgical   Lifestyle    Physical activity     Days per week: Not on file     Minutes per session: Not on file    Stress: Not on file   Relationships    Social connections     Talks on phone: Not on file     Gets together: Not on file     Attends [employer] service: More than 4 times per year     Active member of club or organization: Not on file     Attends meetings of clubs or organizations: Not on file     Relationship status: Not on file   Other Topics Concern    Not on file   Social History Narrative    Work:      Family: Patient is  2 children.     Habits: Nonsmoker, occasional etoh    Diet/Exercise:     Other:           Vitals:    09/28/20 0832   BP: (!) 145/87   Pulse: 72       Physical Exam  Constitutional:       General: She is not in acute distress.     Appearance: She is well-developed.   HENT:      Head: Normocephalic and atraumatic.   Eyes:      General: No scleral icterus.  Cardiovascular:      Rate and Rhythm: Normal rate.   Pulmonary:      Effort: Pulmonary effort is normal.      Breath sounds: No stridor.   Abdominal:      General: There is no distension.      Palpations: Abdomen is soft.      Tenderness: There is no abdominal tenderness.   Lymphadenopathy:      Cervical: No cervical adenopathy.   Skin:     General: Skin is warm.      Findings: No erythema.          Neurological:      Mental Status: She is alert and oriented to person, place, and time.   Psychiatric:         Behavior: Behavior normal.       Body mass index is 32.51 kg/m².    Covid+ about 6 weeks ago    Assessment & Plan:   54F with likely infected sebaceous cyst, ill defined  Will plan to bring to OR for excision Oct 20  Augmentin

## 2020-09-28 NOTE — PROGRESS NOTES
Patient ID: Joanne Stallings is a 54 y.o. female.    Chief Complaint: No chief complaint on file.      HPI:  54F with recurrent pain, swelling drainage from right axilla. Never had before about a year ago. Will drain then feel better and have no symptoms for months. Then returns. Never had I&D. Been on a few courses of abx. No fevers. Would drain pus like material per patient then turn bloody and dry up. Some pain last week but improved over the past 2 days, minimal pain now.      Review of Systems   Constitutional: Negative for fever.   HENT: Negative for trouble swallowing.    Respiratory: Negative for shortness of breath.    Cardiovascular: Negative for chest pain.   Gastrointestinal: Negative for abdominal pain, blood in stool, nausea and vomiting.   Genitourinary: Negative for dysuria.   Musculoskeletal: Negative for gait problem.   Skin: Negative for rash and wound.   Allergic/Immunologic: Negative for immunocompromised state.   Neurological: Negative for weakness.   Hematological: Does not bruise/bleed easily.   Psychiatric/Behavioral: Negative for agitation.       Current Outpatient Medications   Medication Sig Dispense Refill    blood sugar diagnostic Strp Test blood sugar once daily 100 strip 3    blood sugar diagnostic Strp To check BG once daily, to use with insurance preferred meter 100 strip 3    diclofenac sodium (PENNSAID) 20 mg/gram /actuation(2 %) sopm Apply 40 mg topically 2 (two) times daily. 1 Bottle 1    lancets Misc 1 each by Misc.(Non-Drug; Combo Route) route once daily. 50 each 11    meloxicam (MOBIC) 7.5 MG tablet Take 1 tablet (7.5 mg total) by mouth once daily. 20 tablet 0    mupirocin (BACTROBAN) 2 % ointment Apply to affected area 3 times daily 22 g 0    rosuvastatin (CRESTOR) 20 MG tablet Take 1 tablet (20 mg total) by mouth every evening. 90 tablet 3    triamcinolone acetonide 0.1% (KENALOG) 0.1 % cream Apply topically 2 (two) times daily. 28.4 g 0    blood-glucose meter  kit To check BG1 times daily, to use with insurance preferred meter 1 each 3    metFORMIN (GLUCOPHAGE) 500 MG tablet Take 1 tablet (500 mg total) by mouth before dinner. 90 tablet 3    methylphenidate HCl 36 MG CR tablet Take 1 tablet (36 mg total) by mouth every morning. 30 tablet 0     No current facility-administered medications for this visit.        Review of patient's allergies indicates:   Allergen Reactions    Iodine Hives and Swelling    Shellfish containing products Hives and Swelling       Past Medical History:   Diagnosis Date    Chronic constipation     Depression     Diabetes mellitus     Hyperlipidemia        Past Surgical History:   Procedure Laterality Date     SECTION      COLONOSCOPY N/A 2016    Procedure: COLONOSCOPY;  Surgeon: Angel Sawyer MD;  Location: [facility] ([address] [place] [address]);  Service: Endoscopy;  Laterality: N/A;    HYSTERECTOMY      ALLIE, ovarires and cervix  remain (fibroids)       Family History   Problem Relation Age of Onset    Diabetes Mother     Hypertension Mother     Hyperlipidemia Mother     Glaucoma Mother     Lung cancer Father         lung    Glaucoma Maternal Grandmother     Breast cancer Neg Hx     Colon cancer Neg Hx     Ovarian cancer Neg Hx     Cataracts Neg Hx     Macular degeneration Neg Hx     Retinal detachment Neg Hx     Strabismus Neg Hx     Blindness Neg Hx     Amblyopia Neg Hx        Social History     Socioeconomic History    Marital status: Single     Spouse name: Not on file    Number of children: Not on file    Years of education: Not on file    Highest education level: Not on file   Occupational History    Not on file   Social Needs    Financial resource strain: Not on file    Food insecurity     Worry: Not on file     Inability: Not on file    Transportation needs     Medical: Not on file     Non-medical: Not on file   Tobacco Use    Smoking status: Never Smoker    Smokeless tobacco: Never Used   Substance and  Sexual Activity    Alcohol use: Yes     Comment: occasional use only/ not weekly    Drug use: No    Sexual activity: Yes     Partners: Male     Birth control/protection: Surgical   Lifestyle    Physical activity     Days per week: Not on file     Minutes per session: Not on file    Stress: Not on file   Relationships    Social connections     Talks on phone: Not on file     Gets together: Not on file     Attends [employer] service: More than 4 times per year     Active member of club or organization: Not on file     Attends meetings of clubs or organizations: Not on file     Relationship status: Not on file   Other Topics Concern    Not on file   Social History Narrative    Work:      Family: Patient is  2 children.     Habits: Nonsmoker, occasional etoh    Diet/Exercise:     Other:           Vitals:    09/28/20 0832   BP: (!) 145/87   Pulse: 72       Physical Exam  Constitutional:       General: She is not in acute distress.     Appearance: She is well-developed.   HENT:      Head: Normocephalic and atraumatic.   Eyes:      General: No scleral icterus.  Cardiovascular:      Rate and Rhythm: Normal rate.   Pulmonary:      Effort: Pulmonary effort is normal.      Breath sounds: No stridor.   Abdominal:      General: There is no distension.      Palpations: Abdomen is soft.      Tenderness: There is no abdominal tenderness.   Lymphadenopathy:      Cervical: No cervical adenopathy.   Skin:     General: Skin is warm.      Findings: No erythema.          Neurological:      Mental Status: She is alert and oriented to person, place, and time.   Psychiatric:         Behavior: Behavior normal.       Body mass index is 32.51 kg/m².    Covid+ about 6 weeks ago    Assessment & Plan:   54F with likely infected sebaceous cyst, ill defined  Will plan to bring to OR for excision Oct 20  Augmentin

## 2020-10-05 ENCOUNTER — ANESTHESIA EVENT (OUTPATIENT)
Dept: SURGERY | Facility: HOSPITAL | Age: 55
End: 2020-10-05
Payer: COMMERCIAL

## 2020-10-09 NOTE — ANESTHESIA PREPROCEDURE EVALUATION
10/09/2020  Joanne Stallings is a 54 y.o., female w hx of DM2, HLD, MDD for R axillary mass excision     Anesthesia Evaluation    I have reviewed the Patient Summary Reports.    I have reviewed the Nursing Notes.       Review of Systems  Anesthesia Hx:   Denies Personal Hx of Anesthesia complications.   Cardiovascular:   hyperlipidemia ECG has been reviewed. 2/19 EKG   NSR    Able to walk 2 flights of stairs w/o SOB/CP   Pulmonary:  Pulmonary Normal    Renal/:  Renal/ Normal     OB/GYN/PEDS:  S/p hysterectomy   Endocrine:   Diabetes, type 2    Psych:   depression          Physical Exam   Airway/Jaw/Neck:  Airway Findings: Mouth Opening: Normal Mallampati: II  Improves to I with phonation.  TM Distance: Normal, at least 6 cm  Jaw/Neck Findings:  Neck ROM: Normal ROM       Chest/Lungs:  Chest/Lungs Findings: Clear to auscultation     Heart/Vascular:  Heart Findings: Rate: Normal  Rhythm: Regular Rhythm             Anesthesia Plan  Type of Anesthesia, risks & benefits discussed:  Anesthesia Type:  general, MAC  Patient's Preference:   Intra-op Monitoring Plan: standard ASA monitors  Intra-op Monitoring Plan Comments:   Post Op Pain Control Plan: multimodal analgesia  Post Op Pain Control Plan Comments:   Induction:   IV  Beta Blocker:         Informed Consent: Patient understands risks and agrees with Anesthesia plan.  Questions answered. Anesthesia consent signed with patient.  ASA Score: 2     Day of Surgery Review of History & Physical:            Ready For Surgery From Anesthesia Perspective.     Lab Results   Component Value Date    WBC 4.83 08/11/2020    HGB 12.6 08/11/2020    HCT 40.6 08/11/2020     08/11/2020    CHOL 266 (H) 08/11/2020    TRIG 84 08/11/2020    HDL 54 08/11/2020    ALT 19 08/11/2020    AST 23 08/11/2020     08/11/2020    K 4.6 08/11/2020     08/11/2020     CREATININE 0.9 08/11/2020    BUN 18 08/11/2020    CO2 25 08/11/2020    TSH 1.435 08/11/2020    INR 1.0 11/21/2015    HGBA1C 6.7 (H) 08/11/2020

## 2020-10-12 ENCOUNTER — TELEPHONE (OUTPATIENT)
Dept: SURGERY | Facility: CLINIC | Age: 55
End: 2020-10-12

## 2020-10-12 NOTE — TELEPHONE ENCOUNTER
----- Message from Oneil Humphrey sent at 10/12/2020  9:20 AM CDT -----  Ana ,          called this morning to ask if she should come in for PAT due to her daughter being quarantined ( someone in her daughter class has covid )  at home , and of course she is around her daughter . We told her not to come in , but I don't know what you guys want to do in the office regarding her surgery . Just letting you know .           Thanks ,   Britany    10/12/20  3:54pm  Attempted to reach patient regarding above message. No answer. Message left via voicemail.

## 2020-10-12 NOTE — TELEPHONE ENCOUNTER
----- Message from Yin Solis sent at 10/12/2020  4:00 PM CDT -----  Regarding: Returning Call  Contact: Self/ [phone]  Patient called in returning your call. Please advise.    10/12/20  4:04pm  Spoke to patient regarding quarantine. Patient stated her daughter's school called today stating the classmate tested negative for Covid. I notified Britany in surgery scheduling to reschedule pre-op visit. Verbalized understanding.

## 2020-10-13 ENCOUNTER — HOSPITAL ENCOUNTER (OUTPATIENT)
Dept: PREADMISSION TESTING | Facility: HOSPITAL | Age: 55
Discharge: HOME | End: 2020-10-13
Attending: STUDENT IN AN ORGANIZED HEALTH CARE EDUCATION/TRAINING PROGRAM
Payer: COMMERCIAL

## 2020-10-13 VITALS
BODY MASS INDEX: 32.58 KG/M2 | OXYGEN SATURATION: 100 % | SYSTOLIC BLOOD PRESSURE: 135 MMHG | DIASTOLIC BLOOD PRESSURE: 79 MMHG | HEIGHT: 57 IN | RESPIRATION RATE: 16 BRPM | HEART RATE: 68 BPM | WEIGHT: 151 LBS

## 2020-10-13 NOTE — DISCHARGE INSTRUCTIONS
Your surgery is scheduled for 10/20/20.    Please report to Front Lobby on the 1st Floor at 5:30a.m.    THIS TIME IS SUBJECT TO CHANGE.  YOU WILL RECEIVE A PHONE CALL THE DAY BEFORE SURGERY BY 3:30 PM TO CONFIRM YOUR TIME OF ARRIVAL.  IF YOU HAVE NOT RECEIVED A PHONE CALL BY 3:30 PM THE DAY BEFORE YOUR SURGERY PLEASE CALL [phone]     INSTRUCTIONS IMPORTANT!!!  ¨ Do not eat or drink after 12 midnight-including water. OK to brush teeth, no   gum, candy or mints!          ____  Do not wear makeup, including mascara.  ____  No powder, lotions or creams to surgical area.  ____  Please remove all jewelry, including piercings and leave at home.  ____  No money or valuables needed. Please leave at home.  ____  Please bring any documents given by your doctor.  ____  If going home the same day, arrange for a ride home. You will not be able to             drive if Anesthesia was used.  ____  Children under 18 years require a parent / guardian present the entire time             they are in surgery / recovery.  ____  Wear loose fitting clothing. Allow for dressings, bandages.  ____  Stop Aspirin, Ibuprofen, Motrin and Aleve at least 3-5 days before surgery, unless otherwise instructed by your doctor, or the nurse.   You MAY use Tylenol/acetaminophen until day of surgery.  ____  Wash the surgical area with Hibiclens the night before surgery, and again the             morning of surgery.  Be sure to rinse hibiclens off completely (if instructed by   nurse).  ____  If you take diabetic medication, do not take am of surgery unless instructed by Doctor.  ____  Call MD for temperature above 101 degrees or any other signs of infection such as Urinary (bladder) infection, Upper respiratory infection, skin boils, etc.  ____ Stop taking any Fish Oil supplement or any Vitamins that contain Vitamin E at least 5 days prior to surgery.  ____ Do Not wear your contact lenses the day of your procedure.  You may wear your glasses.       ____Do not shave surgical site for 3 days prior to surgery.  ____ Practice Good hand washing before, during, and after procedure.      I have read or had read and explained to me, and understand the above information.  Additional comments or instructions:  For additional questions call [phone]      ANESTHESIA SIDE EFFECTS  -For the first 24 hours after surgery:  Do not drive, use heavy equipment, make important decisions, or drink alcohol  -It is normal to feel sleepy for several hours.  Rest until you are more awake.  -Have someone stay with you, if needed.  They can watch for problems and help keep you safe.  -Some possible post anesthesia side effects include: nausea and vomiting, sore throat and hoarseness, sleepiness, and dizziness.        Pre-Op Bathing Instructions    Before surgery, you can play an important role in your own health.    Because skin is not sterile, we need to be sure that your skin is as free of germs as possible. By following the instructions below, you can reduce the number of germs on your skin before surgery.    IMPORTANT: You will need to shower with a special soap called Hibiclens*, available at any pharmacy.  If you are allergic to Chlorhexidine (the antiseptic in Hibiclens), use an antibacterial soap such as Dial Soap for your preoperative shower.  You will shower with Hibiclens both the night before your surgery and the morning of your surgery.  Do not use Hibiclens on the head, face or genitals to avoid injury to those areas.    STEP #1: THE NIGHT BEFORE YOUR SURGERY     1. Do not shave the area of your body where your surgery will be performed.  2. Shower and wash your hair and body as usual with your normal soap and shampoo.  3. Rinse your hair and body thoroughly after you shower to remove all soap residue.  4. With your hand, apply one packet of Hibiclens soap to the surgical site.   5. Wash the site gently for five (5) minutes. Do not scrub your skin too hard.   6. Do not  wash with your regular soap after Hibiclens is used.  7. Rinse your body thoroughly.  8. Pat yourself dry with a clean, soft towel.  9. Do not use lotion, cream, or powder.  10. Wear clean clothes.    STEP #2: THE MORNING OF YOUR SURGERY     1. Repeat Step #1.    * Not to be used by people allergic to Chlorhexidine.

## 2020-10-13 NOTE — PRE-PROCEDURE INSTRUCTIONS
Shae - Daughter - [phone]    Allergies, medical, surgical, family and psychosocial histories reviewed with patient. Periop plan of care reviewed. Preop instructions given, including medications to take and to hold. Hibiclens soap and instructions on use given. Time allotted for questions to be addressed.  Patient verbalized understanding.

## 2020-10-17 ENCOUNTER — CLINICAL SUPPORT (OUTPATIENT)
Dept: URGENT CARE | Facility: CLINIC | Age: 55
End: 2020-10-17
Payer: COMMERCIAL

## 2020-10-17 DIAGNOSIS — L02.419 AXILLARY ABSCESS: ICD-10-CM

## 2020-10-17 PROCEDURE — U0003 INFECTIOUS AGENT DETECTION BY NUCLEIC ACID (DNA OR RNA); SEVERE ACUTE RESPIRATORY SYNDROME CORONAVIRUS 2 (SARS-COV-2) (CORONAVIRUS DISEASE [COVID-19]), AMPLIFIED PROBE TECHNIQUE, MAKING USE OF HIGH THROUGHPUT TECHNOLOGIES AS DESCRIBED BY CMS-2020-01-R: HCPCS

## 2020-10-18 LAB — SARS-COV-2 RNA RESP QL NAA+PROBE: NOT DETECTED

## 2020-10-20 ENCOUNTER — ANESTHESIA (OUTPATIENT)
Dept: SURGERY | Facility: HOSPITAL | Age: 55
End: 2020-10-20
Payer: COMMERCIAL

## 2020-10-20 ENCOUNTER — HOSPITAL ENCOUNTER (OUTPATIENT)
Facility: HOSPITAL | Age: 55
Discharge: HOME | End: 2020-10-20
Attending: STUDENT IN AN ORGANIZED HEALTH CARE EDUCATION/TRAINING PROGRAM | Admitting: STUDENT IN AN ORGANIZED HEALTH CARE EDUCATION/TRAINING PROGRAM
Payer: COMMERCIAL

## 2020-10-20 VITALS
RESPIRATION RATE: 16 BRPM | HEART RATE: 72 BPM | OXYGEN SATURATION: 98 % | DIASTOLIC BLOOD PRESSURE: 74 MMHG | WEIGHT: 151 LBS | SYSTOLIC BLOOD PRESSURE: 139 MMHG | BODY MASS INDEX: 32.58 KG/M2 | HEIGHT: 57 IN | TEMPERATURE: 98 F

## 2020-10-20 DIAGNOSIS — L02.419 AXILLARY ABSCESS: Primary | ICD-10-CM

## 2020-10-20 LAB — POCT GLUCOSE: 111 MG/DL (ref 70–110)

## 2020-10-20 PROCEDURE — 37000008 HC ANESTHESIA 1ST 15 MINUTES: Performed by: STUDENT IN AN ORGANIZED HEALTH CARE EDUCATION/TRAINING PROGRAM

## 2020-10-20 PROCEDURE — 25000003 PHARM REV CODE 250: Performed by: STUDENT IN AN ORGANIZED HEALTH CARE EDUCATION/TRAINING PROGRAM

## 2020-10-20 PROCEDURE — 88304 TISSUE EXAM BY PATHOLOGIST: CPT | Mod: 26,,, | Performed by: PATHOLOGY

## 2020-10-20 PROCEDURE — 88304 PR  SURG PATH,LEVEL III: ICD-10-PCS | Mod: 26,,, | Performed by: PATHOLOGY

## 2020-10-20 PROCEDURE — 11403 EXC TR-EXT B9+MARG 2.1-3CM: CPT | Mod: ,,, | Performed by: STUDENT IN AN ORGANIZED HEALTH CARE EDUCATION/TRAINING PROGRAM

## 2020-10-20 PROCEDURE — 71000015 HC POSTOP RECOV 1ST HR: Performed by: STUDENT IN AN ORGANIZED HEALTH CARE EDUCATION/TRAINING PROGRAM

## 2020-10-20 PROCEDURE — 63600175 PHARM REV CODE 636 W HCPCS: Performed by: NURSE ANESTHETIST, CERTIFIED REGISTERED

## 2020-10-20 PROCEDURE — 36000707: Performed by: STUDENT IN AN ORGANIZED HEALTH CARE EDUCATION/TRAINING PROGRAM

## 2020-10-20 PROCEDURE — 63600175 PHARM REV CODE 636 W HCPCS: Performed by: STUDENT IN AN ORGANIZED HEALTH CARE EDUCATION/TRAINING PROGRAM

## 2020-10-20 PROCEDURE — 88304 TISSUE EXAM BY PATHOLOGIST: CPT | Performed by: PATHOLOGY

## 2020-10-20 PROCEDURE — 11403 PR EXC SKIN BENIG 2.1-3 CM TRUNK,ARM,LEG: ICD-10-PCS | Mod: ,,, | Performed by: STUDENT IN AN ORGANIZED HEALTH CARE EDUCATION/TRAINING PROGRAM

## 2020-10-20 PROCEDURE — 71000016 HC POSTOP RECOV ADDL HR: Performed by: STUDENT IN AN ORGANIZED HEALTH CARE EDUCATION/TRAINING PROGRAM

## 2020-10-20 PROCEDURE — 37000009 HC ANESTHESIA EA ADD 15 MINS: Performed by: STUDENT IN AN ORGANIZED HEALTH CARE EDUCATION/TRAINING PROGRAM

## 2020-10-20 PROCEDURE — 36000706: Performed by: STUDENT IN AN ORGANIZED HEALTH CARE EDUCATION/TRAINING PROGRAM

## 2020-10-20 RX ORDER — PROPOFOL 10 MG/ML
VIAL (ML) INTRAVENOUS CONTINUOUS PRN
Status: DISCONTINUED | OUTPATIENT
Start: 2020-10-20 | End: 2020-10-20

## 2020-10-20 RX ORDER — SODIUM CHLORIDE, SODIUM LACTATE, POTASSIUM CHLORIDE, CALCIUM CHLORIDE 600; 310; 30; 20 MG/100ML; MG/100ML; MG/100ML; MG/100ML
INJECTION, SOLUTION INTRAVENOUS CONTINUOUS PRN
Status: DISCONTINUED | OUTPATIENT
Start: 2020-10-20 | End: 2020-10-20

## 2020-10-20 RX ORDER — BUPIVACAINE HYDROCHLORIDE 2.5 MG/ML
INJECTION, SOLUTION EPIDURAL; INFILTRATION; INTRACAUDAL
Status: DISCONTINUED | OUTPATIENT
Start: 2020-10-20 | End: 2020-10-20 | Stop reason: HOSPADM

## 2020-10-20 RX ORDER — CEFAZOLIN SODIUM 2 G/50ML
2 SOLUTION INTRAVENOUS
Status: COMPLETED | OUTPATIENT
Start: 2020-10-20 | End: 2020-10-20

## 2020-10-20 RX ORDER — HYDROCODONE BITARTRATE AND ACETAMINOPHEN 5; 325 MG/1; MG/1
1 TABLET ORAL EVERY 4 HOURS PRN
Status: CANCELLED | OUTPATIENT
Start: 2020-10-20

## 2020-10-20 RX ORDER — ACETAMINOPHEN 10 MG/ML
INJECTION, SOLUTION INTRAVENOUS
Status: DISCONTINUED | OUTPATIENT
Start: 2020-10-20 | End: 2020-10-20

## 2020-10-20 RX ORDER — HYDROCODONE BITARTRATE AND ACETAMINOPHEN 5; 325 MG/1; MG/1
1 TABLET ORAL EVERY 4 HOURS PRN
Qty: 10 TABLET | Refills: 0 | Status: SHIPPED | OUTPATIENT
Start: 2020-10-20 | End: 2021-09-30

## 2020-10-20 RX ORDER — LIDOCAINE HYDROCHLORIDE 10 MG/ML
INJECTION, SOLUTION EPIDURAL; INFILTRATION; INTRACAUDAL; PERINEURAL
Status: DISCONTINUED | OUTPATIENT
Start: 2020-10-20 | End: 2020-10-20 | Stop reason: HOSPADM

## 2020-10-20 RX ORDER — PROPOFOL 10 MG/ML
VIAL (ML) INTRAVENOUS
Status: DISCONTINUED | OUTPATIENT
Start: 2020-10-20 | End: 2020-10-20

## 2020-10-20 RX ORDER — MIDAZOLAM HYDROCHLORIDE 1 MG/ML
INJECTION, SOLUTION INTRAMUSCULAR; INTRAVENOUS
Status: DISCONTINUED | OUTPATIENT
Start: 2020-10-20 | End: 2020-10-20

## 2020-10-20 RX ORDER — AMOXICILLIN 250 MG
1 CAPSULE ORAL 2 TIMES DAILY
COMMUNITY
Start: 2020-10-20 | End: 2021-09-30

## 2020-10-20 RX ORDER — FENTANYL CITRATE 50 UG/ML
INJECTION, SOLUTION INTRAMUSCULAR; INTRAVENOUS
Status: DISCONTINUED | OUTPATIENT
Start: 2020-10-20 | End: 2020-10-20

## 2020-10-20 RX ADMIN — MIDAZOLAM 2 MG: 1 INJECTION INTRAMUSCULAR; INTRAVENOUS at 07:10

## 2020-10-20 RX ADMIN — SODIUM CHLORIDE, SODIUM LACTATE, POTASSIUM CHLORIDE, AND CALCIUM CHLORIDE: .6; .31; .03; .02 INJECTION, SOLUTION INTRAVENOUS at 06:10

## 2020-10-20 RX ADMIN — FENTANYL CITRATE 25 MCG: 50 INJECTION, SOLUTION INTRAMUSCULAR; INTRAVENOUS at 07:10

## 2020-10-20 RX ADMIN — ACETAMINOPHEN 1000 MG: 10 INJECTION, SOLUTION INTRAVENOUS at 07:10

## 2020-10-20 RX ADMIN — CEFAZOLIN SODIUM 2 G: 2 SOLUTION INTRAVENOUS at 07:10

## 2020-10-20 RX ADMIN — PROPOFOL 125 MCG/KG/MIN: 10 INJECTION, EMULSION INTRAVENOUS at 07:10

## 2020-10-20 RX ADMIN — FENTANYL CITRATE 50 MCG: 50 INJECTION, SOLUTION INTRAMUSCULAR; INTRAVENOUS at 07:10

## 2020-10-20 RX ADMIN — PROPOFOL 50 MG: 10 INJECTION, EMULSION INTRAVENOUS at 07:10

## 2020-10-20 NOTE — TRANSFER OF CARE
"Anesthesia Transfer of Care Note    Patient: Joanne Stallings    Procedure(s) Performed: Procedure(s) (LRB):  EXCISION, MASS, AXILLARY (Right)    Patient location: [facility]    Anesthesia Type: general    Transport from OR: Transported from OR on 6-10 L/min O2 by face mask with adequate spontaneous ventilation    Post pain: adequate analgesia    Post assessment: no apparent anesthetic complications    Post vital signs: stable    Level of consciousness: awake, alert and oriented    Nausea/Vomiting: no nausea/vomiting    Complications: none    Transfer of care protocol was followed      Last vitals:   Visit Vitals  BP (!) 144/69   Pulse 64   Temp 36.6 °C (97.9 °F) (Skin)   Resp 16   Ht 4' 9" (1.448 m)   Wt 68.5 kg (151 lb)   LMP  (LMP Unknown)   SpO2 100%   Breastfeeding No   BMI 32.68 kg/m²     "

## 2020-10-20 NOTE — DISCHARGE INSTRUCTIONS
ANESTHESIA  -For the first 24 hours after surgery:  Do not drive, use heavy equipment, make important decisions, or drink alcohol  -It is normal to feel sleepy for several hours.  Rest until you are more awake.  -Have someone stay with you, if needed.  They can watch for problems and help keep you safe.  -Some possible post anesthesia side effects include: nausea and vomiting, sore throat and hoarseness, sleepiness, and dizziness.    PAIN  -If you have pain after surgery, pain medicine will help you feel better.  Take it as directed, before pain becomes severe.  Most pain relievers taken by mouth need at least 20-30 minutes to start working.  -Do not drive or drink alcohol while taking pain medicine.  -Pain medication can upset your stomach.  Taking them with a little food may help.  -Other ways to help control pain: elevation, ice, and relaxation  -Call your surgeon if still having unmanageable pain an hour after taking pain medicine.  -Pain medicine can cause constipation.  Taking an over-the counter stool softener while on prescription pain medicine and drinking plenty of fluids can prevent this side effect.  -Call your surgeon if you have severe side effects like: breathing problems, trouble waking up, dizziness, confusion, or severe constipation.    NAUSEA  -Some people have nausea after surgery.  This is often because of anesthesia, pain, pain medicine, or the stress of surgery.  -Do not push yourself to eat.  Start off with clear liquids and soup.  Slowly move to solid foods.  Don't eat fatty, rich, spicy foods at first.  Eat smaller amounts.  -If you develop persistent nausea and vomiting please notify your surgeon immediately.    BLEEDING  -Different types of surgery require different types of care and dressing changes.  It is important to follow all instructions and advice from your surgeon.  Change dressing as directed.  Call your surgeon for any concerns regarding postop bleeding.    SIGNS OF  INFECTION  -Signs of infection include: fever, swelling, drainage, and redness  -Notify your surgeon if you have a fever of 100.4 F (38.0 C) or higher.  -Notify your surgeon if you notice redness, swelling, increased pain, pus, or a foul smell at the incision site.    Notify Dr. Baumann for any problems or concerns

## 2020-10-20 NOTE — INTERVAL H&P NOTE
The patient has been examined and the H&P has been reviewed:    I concur with the findings and no changes have occurred since H&P was written.    Surgery risks, benefits and alternative options discussed and understood by patient/family.          Active Hospital Problems    Diagnosis  POA    Axillary abscess [L02.419]  Yes      Resolved Hospital Problems   No resolved problems to display.

## 2020-10-20 NOTE — ANESTHESIA POSTPROCEDURE EVALUATION
Anesthesia Post Evaluation    Patient: Joanne Stallings    Procedure(s) Performed: Procedure(s) (LRB):  EXCISION, MASS, AXILLARY (Right)    Final Anesthesia Type: MAC    Patient location during evaluation: OPS  Patient participation: Yes- Able to Participate  Level of consciousness: awake and alert and oriented  Post-procedure vital signs: reviewed and stable  Pain management: adequate  Airway patency: patent    PONV status at discharge: No PONV  Anesthetic complications: no      Cardiovascular status: blood pressure returned to baseline  Respiratory status: unassisted  Hydration status: euvolemic  Follow-up not needed.          Vitals Value Taken Time   /74 10/20/20 0800   Temp 36.6 °C (97.8 °F) 10/20/20 0800   Pulse 75 10/20/20 0800   Resp 16 10/20/20 0800   SpO2 97 % 10/20/20 0800         No case tracking events are documented in the log.      Pain/David Score: David Score: 10 (10/20/2020  8:00 AM)

## 2020-10-20 NOTE — OP NOTE
[facility]  General Surgery  Operative Note    SUMMARY     Date of Procedure: 10/20/2020     Procedure: Procedure(s) (LRB):  EXCISION, MASS, AXILLARY (Right)       Surgeon(s) and Role:     * Ted Baumann MD - Primary    Assisting Surgeon: Quynh Chin PGY2    Pre-Operative Diagnosis: Axillary abscess [L02.419]    Post-Operative Diagnosis: Post-Op Diagnosis Codes:     * Axillary abscess [L02.419]    Anesthesia: General/MAC    Technical Procedures Used: Brought into OR and give MAC. The right axilla was prepped and draped. The area of previous drainage was incised in an elliptical fashion. Cautery was used to dissect down to the underlying subQ fat. No cyst or mass was noted. There was no purulence. Some previous scar tissue was noted and was excised with the ellipse of skin. Hemostasis was obtained and the deep space was closed using 2-0 vicryl. The skin was closed using 3-0 monocryl in a running fashion.    Description of the Findings of the Procedure: Ellipse of skin was excised measuring 2cm by 3cm.    Significant Surgical Tasks Conducted by the Assistant(s), if Applicable:     Complications: No    Estimated Blood Loss (EBL): * No values recorded between 10/20/2020  7:18 AM and 10/20/2020  7:44 AM *           Implants: * No implants in log *    Specimens:   Specimen (12h ago, onward)    None                  Condition: Stable    Disposition: PACU - hemodynamically stable.    Attestation: I was present and scrubbed for the entire procedure.

## 2020-10-23 LAB
FINAL PATHOLOGIC DIAGNOSIS: NORMAL
GROSS: NORMAL
Lab: NORMAL

## 2020-10-30 NOTE — DISCHARGE SUMMARY
[facility]  Short Stay  Discharge Summary    Admit Date: 10/20/2020    Discharge Date and Time: 10/20/2020  9:30 AM      Discharge Attending Physician: No att. providers found     Hospital Course (synopsis of major diagnoses, care, treatment, and services provided during the course of the hospital stay): Patient brought in for elective surgery. Underwent procedure without complication and was discharged.       Final Diagnoses:    Principal Problem: Axillary abscess   Secondary Diagnoses:   Active Hospital Problems    Diagnosis  POA    *Axillary abscess [L02.419]  Yes      Resolved Hospital Problems   No resolved problems to display.       Discharged Condition: stable    Disposition: Home or Self Care    Follow up/Patient Instructions:    Medications:  Reconciled Home Medications:      Medication List      START taking these medications    HYDROcodone-acetaminophen 5-325 mg per tablet  Commonly known as: NORCO  Take 1 tablet by mouth every 4 (four) hours as needed for Pain.     senna-docusate 8.6-50 mg 8.6-50 mg per tablet  Commonly known as: PERICOLACE  Take 1 tablet by mouth 2 (two) times daily.        CONTINUE taking these medications    * blood sugar diagnostic Strp  Test blood sugar once daily     * blood sugar diagnostic Strp  To check BG once daily, to use with insurance preferred meter     blood-glucose meter kit  To check BG1 times daily, to use with insurance preferred meter     lancets Misc  1 each by Misc.(Non-Drug; Combo Route) route once daily.     meloxicam 7.5 MG tablet  Commonly known as: MOBIC  Take 1 tablet (7.5 mg total) by mouth once daily.     metFORMIN 500 MG tablet  Commonly known as: GLUCOPHAGE  Take 1 tablet (500 mg total) by mouth before dinner.     methylphenidate HCl 36 MG CR tablet  Take 1 tablet (36 mg total) by mouth every morning.     rosuvastatin 20 MG tablet  Commonly known as: CRESTOR  Take 1 tablet (20 mg total) by mouth every evening.         * This list has 2  medication(s) that are the same as other medications prescribed for you. Read the directions carefully, and ask your doctor or other care provider to review them with you.            STOP taking these medications    amoxicillin-clavulanate 500-125mg 500-125 mg Tab  Commonly known as: AUGMENTIN          Discharge Procedure Orders   Diet general     Call MD for:  temperature >100.4     Call MD for:  persistent nausea and vomiting     Call MD for:  severe uncontrolled pain     No dressing needed     Activity as tolerated     Follow-up Information     Ted Baumann MD In 2 weeks.    Specialties: Surgery, General Surgery  Contact information:  [address]  [address]  [facility] [address] [phone]

## 2020-11-03 ENCOUNTER — OFFICE VISIT (OUTPATIENT)
Dept: SURGERY | Facility: CLINIC | Age: 55
End: 2020-11-03
Payer: COMMERCIAL

## 2020-11-03 VITALS
DIASTOLIC BLOOD PRESSURE: 75 MMHG | BODY MASS INDEX: 33.57 KG/M2 | SYSTOLIC BLOOD PRESSURE: 126 MMHG | HEART RATE: 76 BPM | HEIGHT: 57 IN | WEIGHT: 155.63 LBS

## 2020-11-03 DIAGNOSIS — L02.419 AXILLARY ABSCESS: Primary | ICD-10-CM

## 2020-11-03 PROCEDURE — 99024 POSTOP FOLLOW-UP VISIT: CPT | Mod: S$GLB,,, | Performed by: STUDENT IN AN ORGANIZED HEALTH CARE EDUCATION/TRAINING PROGRAM

## 2020-11-03 PROCEDURE — 99999 PR PBB SHADOW E&M-EST. PATIENT-LVL III: ICD-10-PCS | Mod: PBBFAC,,, | Performed by: STUDENT IN AN ORGANIZED HEALTH CARE EDUCATION/TRAINING PROGRAM

## 2020-11-03 PROCEDURE — 99999 PR PBB SHADOW E&M-EST. PATIENT-LVL III: CPT | Mod: PBBFAC,,, | Performed by: STUDENT IN AN ORGANIZED HEALTH CARE EDUCATION/TRAINING PROGRAM

## 2020-11-03 PROCEDURE — 99024 PR POST-OP FOLLOW-UP VISIT: ICD-10-PCS | Mod: S$GLB,,, | Performed by: STUDENT IN AN ORGANIZED HEALTH CARE EDUCATION/TRAINING PROGRAM

## 2020-11-03 NOTE — PROGRESS NOTES
" Clinic Follow-up  General Surgery    Date: 11/03/2020  Interval: Joanne Stallings is a 55 y.o. female seen today in follow up s/p right axillary abscess incision and drainage.        SUBJECTIVE:     Patient is doing well post-operatively. She states pain is well controlled, not requiring pain medicaition. Denies any drainage from incision, no swelling or recurrence of mass in that area. No fevers or chills.    OBJECTIVE:     Vital Signs (Most Recent)  Vitals:    11/03/20 1517   BP: 126/75   BP Location: Left arm   Patient Position: Sitting   Pulse: 76   Weight: 70.6 kg (155 lb 10.3 oz)   Height: 4' 9" (1.448 m)       Physical Exam:  General: well developed, well nourished, no distress  Lungs:  clear to auscultation bilaterally and normal respiratory effort  Heart: regular rate and rhythm, S1, S2 normal, no murmur, rub or gallop  Abdomen: soft, non-tender non-distented; bowel sounds normal; no masses,  no organomegaly    Wound/Incision:  Right axillary incision healing well without surrounding erythema or induration. Center of incision with small opening but no drainage.    Review of Systems - General ROS: negative for - chills or fever  ENT ROS: negative for - visual changes or vocal changes  Hematological and Lymphatic ROS: negative for - bleeding problems or weight loss  Endocrine ROS: negative for - skin changes or unexpected weight changes  Respiratory ROS: no cough, shortness of breath, or wheezing  Cardiovascular ROS: no chest pain or dyspnea on exertion  Gastrointestinal ROS: no abdominal pain, change in bowel habits, or black or bloody stools  Genito-Urinary ROS: no dysuria, trouble voiding, or hematuria  Musculoskeletal ROS: negative for - muscle pain or muscular weakness  Neurological ROS: no TIA or stroke symptoms  Dermatological ROS: negative for pruritus and rash    Laboratory:  None    Pathology: YES. Right axillary excision.  Benign epidermal inclusion cyst with acute and chronic inflammation and "   evidence of rupture.    ASSESSMENT/PLAN:     Assessment: Doing well, uncomplicated post-operative course.    Plan  Continue local wound care, healing well.   Follow up PRN    Quynh Chin MD  General Surgery, PGY-II  Pager: [phone]  11/3/2020 4:03 PM

## 2020-11-23 ENCOUNTER — PATIENT OUTREACH (OUTPATIENT)
Dept: ADMINISTRATIVE | Facility: OTHER | Age: 55
End: 2020-11-23

## 2020-11-23 NOTE — PROGRESS NOTES
Updates were requested from care everywhere.  Chart was reviewed for overdue Proactive [facility] Encounters ([facility]) topics (CRS, Breast Cancer Screening, Eye exam)  Health Maintenance has been updated.  LINKS not responding.

## 2020-11-27 ENCOUNTER — PATIENT MESSAGE (OUTPATIENT)
Dept: PSYCHIATRY | Facility: CLINIC | Age: 55
End: 2020-11-27

## 2020-11-27 ENCOUNTER — OFFICE VISIT (OUTPATIENT)
Dept: OBSTETRICS AND GYNECOLOGY | Facility: CLINIC | Age: 55
End: 2020-11-27
Payer: COMMERCIAL

## 2020-11-27 VITALS
HEIGHT: 57 IN | WEIGHT: 154.13 LBS | DIASTOLIC BLOOD PRESSURE: 74 MMHG | SYSTOLIC BLOOD PRESSURE: 100 MMHG | BODY MASS INDEX: 33.25 KG/M2

## 2020-11-27 DIAGNOSIS — Z12.31 SCREENING MAMMOGRAM, ENCOUNTER FOR: ICD-10-CM

## 2020-11-27 DIAGNOSIS — Z01.419 ENCOUNTER FOR ANNUAL ROUTINE GYNECOLOGICAL EXAMINATION: Primary | ICD-10-CM

## 2020-11-27 DIAGNOSIS — R30.0 DYSURIA: ICD-10-CM

## 2020-11-27 DIAGNOSIS — Z12.4 PAP SMEAR FOR CERVICAL CANCER SCREENING: ICD-10-CM

## 2020-11-27 PROCEDURE — 99396 PR PREVENTIVE VISIT,EST,40-64: ICD-10-PCS | Mod: S$GLB,,, | Performed by: OBSTETRICS & GYNECOLOGY

## 2020-11-27 PROCEDURE — 99999 PR PBB SHADOW E&M-EST. PATIENT-LVL III: CPT | Mod: PBBFAC,,, | Performed by: OBSTETRICS & GYNECOLOGY

## 2020-11-27 PROCEDURE — 88175 CYTOPATH C/V AUTO FLUID REDO: CPT

## 2020-11-27 PROCEDURE — 99396 PREV VISIT EST AGE 40-64: CPT | Mod: S$GLB,,, | Performed by: OBSTETRICS & GYNECOLOGY

## 2020-11-27 PROCEDURE — 1126F AMNT PAIN NOTED NONE PRSNT: CPT | Mod: S$GLB,,, | Performed by: OBSTETRICS & GYNECOLOGY

## 2020-11-27 PROCEDURE — 99999 PR PBB SHADOW E&M-EST. PATIENT-LVL III: ICD-10-PCS | Mod: PBBFAC,,, | Performed by: OBSTETRICS & GYNECOLOGY

## 2020-11-27 PROCEDURE — 3008F BODY MASS INDEX DOCD: CPT | Mod: CPTII,S$GLB,, | Performed by: OBSTETRICS & GYNECOLOGY

## 2020-11-27 PROCEDURE — 3008F PR BODY MASS INDEX (BMI) DOCUMENTED: ICD-10-PCS | Mod: CPTII,S$GLB,, | Performed by: OBSTETRICS & GYNECOLOGY

## 2020-11-27 PROCEDURE — 87624 HPV HI-RISK TYP POOLED RSLT: CPT

## 2020-11-27 PROCEDURE — 87186 SC STD MICRODIL/AGAR DIL: CPT

## 2020-11-27 PROCEDURE — 1126F PR PAIN SEVERITY QUANTIFIED, NO PAIN PRESENT: ICD-10-PCS | Mod: S$GLB,,, | Performed by: OBSTETRICS & GYNECOLOGY

## 2020-11-27 PROCEDURE — 87088 URINE BACTERIA CULTURE: CPT

## 2020-11-27 PROCEDURE — 87086 URINE CULTURE/COLONY COUNT: CPT

## 2020-11-27 PROCEDURE — 87077 CULTURE AEROBIC IDENTIFY: CPT

## 2020-11-27 RX ORDER — SULFAMETHOXAZOLE AND TRIMETHOPRIM 800; 160 MG/1; MG/1
1 TABLET ORAL 2 TIMES DAILY
Qty: 6 TABLET | Refills: 0 | Status: SHIPPED | OUTPATIENT
Start: 2020-11-27 | End: 2020-11-30

## 2020-11-27 RX ORDER — METHYLPHENIDATE HYDROCHLORIDE 36 MG/1
36 TABLET ORAL EVERY MORNING
Qty: 30 TABLET | Refills: 0 | Status: SHIPPED | OUTPATIENT
Start: 2020-11-27 | End: 2021-02-25 | Stop reason: SDDI

## 2020-11-27 RX ORDER — ESTRADIOL 10 UG/1
INSERT VAGINAL
Qty: 30 TABLET | Refills: 3 | Status: SHIPPED | OUTPATIENT
Start: 2020-11-27 | End: 2023-04-24

## 2020-11-27 NOTE — PROGRESS NOTES
11/27/2020: Patient called to say her medicine was not working as well as originally. However, she does not take it daily, and had not filled her Concerta prescription since August and it was for a 1 month supply when last prescribed. I discussed with her the importance of taking the medicine daily as prescribed and she agreed to do so. She also has an added stress now of having to care for her mother with dementia. Patient is in good self control, and has no thoughts of harming herself and also agreed to make an appointment as soon as possible.

## 2020-11-27 NOTE — PROGRESS NOTES
Chief Complaint   Patient presents with    Well Woman     she is having pressure at the end of urination.       HISTORY OF PRESENT ILLNESS:   Joanne Stallings is a 55 y.o. female  S/p supracervical hysterectomy who presents for well woman exam.  No LMP recorded (lmp unknown). Patient has had a hysterectomy.. Menopause at age [age] after hysterectomy.  She has complains of pressure at the end of urination. She also reports needs to use vaginal estrogen or she has too much dryness but wants to discuss other options because it is so messy.      Past Medical History:   Diagnosis Date    Chronic constipation     Depression     Diabetes mellitus     Hyperlipidemia         Past Surgical History:   Procedure Laterality Date     SECTION      COLONOSCOPY N/A 2016    Procedure: COLONOSCOPY;  Surgeon: Angel Sawyer MD;  Location: [facility] ([address] [place] [address]);  Service: Endoscopy;  Laterality: N/A;    HYSTERECTOMY      ALLIE, ovarires and cervix  remain (fibroids)    SURGICAL REMOVAL OF MASS OF AXILLA Right 10/20/2020    Procedure: EXCISION, MASS, AXILLARY;  Surgeon: Ted Baumann MD;  Location: [facility];  Service: General;  Laterality: Right;       Social History     Socioeconomic History    Marital status: Single     Spouse name: Not on file    Number of children: Not on file    Years of education: Not on file    Highest education level: Not on file   Occupational History    Not on file   Social Needs    Financial resource strain: Not on file    Food insecurity     Worry: Not on file     Inability: Not on file    Transportation needs     Medical: Not on file     Non-medical: Not on file   Tobacco Use    Smoking status: Never Smoker    Smokeless tobacco: Never Used   Substance and Sexual Activity    Alcohol use: Yes     Comment: occasional use only/ not weekly    Drug use: No    Sexual activity: Yes     Partners: Male     Birth control/protection: Surgical   Lifestyle    Physical activity      Days per week: Not on file     Minutes per session: Not on file    Stress: Not on file   Relationships    Social connections     Talks on phone: Not on file     Gets together: Not on file     Attends Sikh service: More than 4 times per year     Active member of club or organization: Not on file     Attends meetings of clubs or organizations: Not on file     Relationship status: Not on file   Other Topics Concern    Not on file   Social History Narrative    Work:      Family: Patient is  2 children.     Habits: Nonsmoker, occasional etoh    Diet/Exercise:     Other:           Family History   Problem Relation Age of Onset    Diabetes Mother     Hypertension Mother     Hyperlipidemia Mother     Glaucoma Mother     Lung cancer Father         lung    Glaucoma Maternal Grandmother     Breast cancer Neg Hx     Colon cancer Neg Hx     Ovarian cancer Neg Hx     Cataracts Neg Hx     Macular degeneration Neg Hx     Retinal detachment Neg Hx     Strabismus Neg Hx     Blindness Neg Hx     Amblyopia Neg Hx        OB History    Para Term  AB Living   2 2 2     2   SAB TAB Ectopic Multiple Live Births           2      # Outcome Date GA Lbr Ladarius/2nd Weight Sex Delivery Anes PTL Lv   2 Term      CS-LTranv  N DENTON   1 Term      CS-LTranv  N DENTON       COMPREHENSIVE GYN HISTORY:  PAP History: Denies abnormal Paps  Infection History: Denies STDs. Denies PID.  Benign History: had uterine fibroids. Denies ovarian cysts. Denies endometriosis Denies other conditions.  Cancer History: Denies cervical cancer. Denies uterine cancer or hyperplasia. Denies ovarian cancer. Denies vulvar cancer or pre-cancer. Denies vaginal cancer or pre-cancer. Denies breast cancer. Denies colon cancer.  Cycle: /7d heavy   Supracervical hyst 2/2 AUB and fibroids     ROS:  GENERAL: Denies weight gain or weight loss. Feeling well overall.   SKIN: Denies rash or lesions.   HEAD: Denies headache.   NODES: Denies  "enlarged lymph nodes.   CHEST: Denies shortness of breath.   ABDOMEN: No abdominal pain, constipation, diarrhea, nausea, vomiting or rectal bleeding.   URINARY: No frequency, dysuria, hematuria, or burning on urination.  REPRODUCTIVE: See HPI.   BREASTS: The patient denies pain, lumps, or nipple discharge.       /74   Ht 4' 9" (1.448 m)   Wt 69.9 kg (154 lb 1.6 oz)   LMP  (LMP Unknown)   BMI 33.35 kg/m²     APPEARANCE: Well nourished, well developed, in no acute distress.  NECK: Neck symmetric without  thyromegaly.  NODES: No inguinal, cervical lymph node enlargement.  CHEST: Lungs clear to auscultation.  HEART: Regular rate and rhythm, no murmurs, rubs or gallops.  ABDOMEN: Soft. No tenderness or masses. No hernias. No hepatosplenomegaly.  BREASTS: Symmetrical, no skin changes or visible lesions. No palpable masses, nipple discharge or adenopathy bilaterally.  PELVIC:   VULVA: No lesions. Normal female genitalia.  URETHRAL MEATUS: Normal size and location, no lesions, no prolapse.  URETHRA: No masses, tenderness, prolapse or scarring.  VAGINA: Moist and well rugated, no discharge, no significant cystocele or rectocele.  CERVIX: No lesions and discharge.  UTERUS: surgically absent   ADNEXA: No masses or tenderness.  PERINEUM: Normal, mo masses    Data Reviewed:   Urine dip: + leuk est and blood     Lipid profile: Date:   Lab Results   Component Value Date    CHOL 266 (H) 08/11/2020    CHOL 291 (H) 07/12/2019    CHOL 275 (H) 03/04/2019     Lab Results   Component Value Date    HDL 54 08/11/2020    HDL 57 07/12/2019    HDL 55 03/04/2019     Lab Results   Component Value Date    LDLCALC 195.2 (H) 08/11/2020    LDLCALC 215.0 (H) 07/12/2019    LDLCALC 200.4 (H) 03/04/2019     Lab Results   Component Value Date    TRIG 84 08/11/2020    TRIG 95 07/12/2019    TRIG 98 03/04/2019     Lab Results   Component Value Date    CHOLHDL 20.3 08/11/2020    CHOLHDL 19.6 (L) 07/12/2019    CHOLHDL 20.0 03/04/2019     TSH:   Lab " Results   Component Value Date    TSH 1.435 08/11/2020     Colonoscopy Date: 2016, repeat 2026    1. Encounter for annual routine gynecological examination    2. Pap smear for cervical cancer screening    3. Screening mammogram, encounter for    4. Dysuria        A/P 1. Routine gyn annual exam. s/p normal breast exam and MMG ordered.  Pap with HPV cotesting ordered.  Lipid Profile, needed every 5 years, up to date. Fasting glucose, needed every 3 years, up to date.   TSH, needed every 5 years, up to date.   Colonoscopy up to date.   2. Will treat for UTI and send for culture   3. Discussed opitions for vaginal estrogen pill vs ring vs cream vs suppository vs osphenia. She would like to do the pill so will try that instead    F/u in 1 yr or PRN

## 2020-11-29 LAB — BACTERIA UR CULT: ABNORMAL

## 2020-12-04 LAB
HPV HR 12 DNA SPEC QL NAA+PROBE: NEGATIVE
HPV16 AG SPEC QL: NEGATIVE
HPV18 DNA SPEC QL NAA+PROBE: NEGATIVE

## 2020-12-26 ENCOUNTER — HOSPITAL ENCOUNTER (OUTPATIENT)
Dept: RADIOLOGY | Facility: HOSPITAL | Age: 55
Discharge: HOME | End: 2020-12-26
Attending: OBSTETRICS & GYNECOLOGY
Payer: COMMERCIAL

## 2020-12-26 VITALS — BODY MASS INDEX: 33.25 KG/M2 | WEIGHT: 154.13 LBS | HEIGHT: 57 IN

## 2020-12-26 DIAGNOSIS — Z01.419 ENCOUNTER FOR ANNUAL ROUTINE GYNECOLOGICAL EXAMINATION: ICD-10-CM

## 2020-12-26 DIAGNOSIS — Z12.31 SCREENING MAMMOGRAM, ENCOUNTER FOR: ICD-10-CM

## 2020-12-26 PROCEDURE — 77063 MAMMO DIGITAL SCREENING BILAT WITH TOMO: ICD-10-PCS | Mod: 26,,, | Performed by: RADIOLOGY

## 2020-12-26 PROCEDURE — 77067 SCR MAMMO BI INCL CAD: CPT | Mod: 26,,, | Performed by: RADIOLOGY

## 2020-12-26 PROCEDURE — 77067 MAMMO DIGITAL SCREENING BILAT WITH TOMO: ICD-10-PCS | Mod: 26,,, | Performed by: RADIOLOGY

## 2020-12-26 PROCEDURE — 77067 SCR MAMMO BI INCL CAD: CPT | Mod: TC

## 2020-12-26 PROCEDURE — 77063 BREAST TOMOSYNTHESIS BI: CPT | Mod: 26,,, | Performed by: RADIOLOGY

## 2021-01-12 ENCOUNTER — PATIENT MESSAGE (OUTPATIENT)
Dept: OBSTETRICS AND GYNECOLOGY | Facility: CLINIC | Age: 56
End: 2021-01-12

## 2021-01-12 LAB
FINAL PATHOLOGIC DIAGNOSIS: NORMAL
Lab: NORMAL

## 2021-01-26 ENCOUNTER — PATIENT MESSAGE (OUTPATIENT)
Dept: PSYCHIATRY | Facility: CLINIC | Age: 56
End: 2021-01-26

## 2021-02-25 ENCOUNTER — OFFICE VISIT (OUTPATIENT)
Dept: PSYCHIATRY | Facility: CLINIC | Age: 56
End: 2021-02-25
Payer: COMMERCIAL

## 2021-02-25 DIAGNOSIS — F98.8 ATTENTION DEFICIT DISORDER (ADD) WITHOUT HYPERACTIVITY: Primary | ICD-10-CM

## 2021-02-25 PROCEDURE — 90833 PSYTX W PT W E/M 30 MIN: CPT | Mod: 95,,, | Performed by: PSYCHIATRY & NEUROLOGY

## 2021-02-25 PROCEDURE — 90833 PR PSYCHOTHERAPY W/PATIENT W/E&M, 30 MIN (ADD ON): ICD-10-PCS | Mod: 95,,, | Performed by: PSYCHIATRY & NEUROLOGY

## 2021-02-25 PROCEDURE — 99214 OFFICE O/P EST MOD 30 MIN: CPT | Mod: 95,,, | Performed by: PSYCHIATRY & NEUROLOGY

## 2021-02-25 PROCEDURE — 99214 PR OFFICE/OUTPT VISIT, EST, LEVL IV, 30-39 MIN: ICD-10-PCS | Mod: 95,,, | Performed by: PSYCHIATRY & NEUROLOGY

## 2021-02-25 RX ORDER — BUSPIRONE HYDROCHLORIDE 5 MG/1
5 TABLET ORAL 2 TIMES DAILY
Qty: 60 TABLET | Refills: 5 | Status: SHIPPED | OUTPATIENT
Start: 2021-02-25 | End: 2021-03-17 | Stop reason: DRUGHIGH

## 2021-03-17 ENCOUNTER — OFFICE VISIT (OUTPATIENT)
Dept: PSYCHIATRY | Facility: CLINIC | Age: 56
End: 2021-03-17
Payer: COMMERCIAL

## 2021-03-17 DIAGNOSIS — F98.8 ATTENTION DEFICIT DISORDER (ADD) WITHOUT HYPERACTIVITY: Primary | ICD-10-CM

## 2021-03-17 DIAGNOSIS — F41.9 ANXIETY: ICD-10-CM

## 2021-03-17 PROCEDURE — 99213 PR OFFICE/OUTPT VISIT, EST, LEVL III, 20-29 MIN: ICD-10-PCS | Mod: 95,,, | Performed by: PSYCHIATRY & NEUROLOGY

## 2021-03-17 PROCEDURE — 99213 OFFICE O/P EST LOW 20 MIN: CPT | Mod: 95,,, | Performed by: PSYCHIATRY & NEUROLOGY

## 2021-03-17 PROCEDURE — 90833 PSYTX W PT W E/M 30 MIN: CPT | Mod: 95,,, | Performed by: PSYCHIATRY & NEUROLOGY

## 2021-03-17 PROCEDURE — 90833 PR PSYCHOTHERAPY W/PATIENT W/E&M, 30 MIN (ADD ON): ICD-10-PCS | Mod: 95,,, | Performed by: PSYCHIATRY & NEUROLOGY

## 2021-03-17 RX ORDER — BUSPIRONE HYDROCHLORIDE 5 MG/1
10 TABLET ORAL 2 TIMES DAILY
Qty: 60 TABLET | Refills: 3 | Status: SHIPPED | OUTPATIENT
Start: 2021-03-17 | End: 2021-10-20

## 2021-03-27 ENCOUNTER — CLINICAL SUPPORT (OUTPATIENT)
Dept: URGENT CARE | Facility: CLINIC | Age: 56
End: 2021-03-27
Payer: COMMERCIAL

## 2021-03-27 DIAGNOSIS — Z11.52 ENCOUNTER FOR SCREENING FOR COVID-19: Primary | ICD-10-CM

## 2021-03-27 LAB
CTP QC/QA: YES
SARS-COV-2 RDRP RESP QL NAA+PROBE: NEGATIVE

## 2021-03-27 PROCEDURE — U0002: ICD-10-PCS | Mod: QW,S$GLB,, | Performed by: NURSE PRACTITIONER

## 2021-03-27 PROCEDURE — U0002 COVID-19 LAB TEST NON-CDC: HCPCS | Mod: QW,S$GLB,, | Performed by: NURSE PRACTITIONER

## 2021-04-09 ENCOUNTER — OFFICE VISIT (OUTPATIENT)
Dept: URGENT CARE | Facility: CLINIC | Age: 56
End: 2021-04-09
Payer: COMMERCIAL

## 2021-04-09 VITALS
HEIGHT: 57 IN | SYSTOLIC BLOOD PRESSURE: 147 MMHG | HEART RATE: 71 BPM | RESPIRATION RATE: 15 BRPM | BODY MASS INDEX: 33.01 KG/M2 | WEIGHT: 153 LBS | OXYGEN SATURATION: 98 % | TEMPERATURE: 98 F | DIASTOLIC BLOOD PRESSURE: 91 MMHG

## 2021-04-09 DIAGNOSIS — S39.012A LUMBAR STRAIN, INITIAL ENCOUNTER: Primary | ICD-10-CM

## 2021-04-09 LAB
BILIRUB UR QL STRIP: NEGATIVE
GLUCOSE UR QL STRIP: NEGATIVE
KETONES UR QL STRIP: NEGATIVE
LEUKOCYTE ESTERASE UR QL STRIP: NEGATIVE
PH, POC UA: 5.5 (ref 5–8)
POC BLOOD, URINE: NEGATIVE
POC NITRATES, URINE: NEGATIVE
PROT UR QL STRIP: NEGATIVE
SP GR UR STRIP: 1 (ref 1–1.03)
UROBILINOGEN UR STRIP-ACNC: NORMAL (ref 0.1–1.1)

## 2021-04-09 PROCEDURE — 96372 PR INJECTION,THERAP/PROPH/DIAG2ST, IM OR SUBCUT: ICD-10-PCS | Mod: S$GLB,,, | Performed by: FAMILY MEDICINE

## 2021-04-09 PROCEDURE — 81003 POCT URINALYSIS, DIPSTICK, AUTOMATED, W/O SCOPE: ICD-10-PCS | Mod: QW,S$GLB,, | Performed by: FAMILY MEDICINE

## 2021-04-09 PROCEDURE — 3008F BODY MASS INDEX DOCD: CPT | Mod: CPTII,S$GLB,, | Performed by: FAMILY MEDICINE

## 2021-04-09 PROCEDURE — 99214 PR OFFICE/OUTPT VISIT, EST, LEVL IV, 30-39 MIN: ICD-10-PCS | Mod: 25,S$GLB,, | Performed by: FAMILY MEDICINE

## 2021-04-09 PROCEDURE — 81003 URINALYSIS AUTO W/O SCOPE: CPT | Mod: QW,S$GLB,, | Performed by: FAMILY MEDICINE

## 2021-04-09 PROCEDURE — 99214 OFFICE O/P EST MOD 30 MIN: CPT | Mod: 25,S$GLB,, | Performed by: FAMILY MEDICINE

## 2021-04-09 PROCEDURE — 3008F PR BODY MASS INDEX (BMI) DOCUMENTED: ICD-10-PCS | Mod: CPTII,S$GLB,, | Performed by: FAMILY MEDICINE

## 2021-04-09 PROCEDURE — 96372 THER/PROPH/DIAG INJ SC/IM: CPT | Mod: S$GLB,,, | Performed by: FAMILY MEDICINE

## 2021-04-09 RX ORDER — KETOROLAC TROMETHAMINE 30 MG/ML
30 INJECTION, SOLUTION INTRAMUSCULAR; INTRAVENOUS
Status: COMPLETED | OUTPATIENT
Start: 2021-04-09 | End: 2021-04-09

## 2021-04-09 RX ORDER — CYCLOBENZAPRINE HCL 10 MG
10 TABLET ORAL 3 TIMES DAILY PRN
Qty: 21 TABLET | Refills: 0 | Status: SHIPPED | OUTPATIENT
Start: 2021-04-09 | End: 2021-04-19

## 2021-04-09 RX ORDER — IBUPROFEN 600 MG/1
600 TABLET ORAL EVERY 8 HOURS PRN
Qty: 30 TABLET | Refills: 0 | Status: SHIPPED | OUTPATIENT
Start: 2021-04-09 | End: 2021-09-30 | Stop reason: SDUPTHER

## 2021-04-09 RX ADMIN — KETOROLAC TROMETHAMINE 30 MG: 30 INJECTION, SOLUTION INTRAMUSCULAR; INTRAVENOUS at 09:04

## 2021-06-15 ENCOUNTER — OFFICE VISIT (OUTPATIENT)
Dept: URGENT CARE | Facility: CLINIC | Age: 56
End: 2021-06-15
Payer: COMMERCIAL

## 2021-06-15 VITALS
BODY MASS INDEX: 33.44 KG/M2 | DIASTOLIC BLOOD PRESSURE: 84 MMHG | SYSTOLIC BLOOD PRESSURE: 146 MMHG | HEART RATE: 83 BPM | HEIGHT: 57 IN | RESPIRATION RATE: 16 BRPM | WEIGHT: 155 LBS | OXYGEN SATURATION: 98 % | TEMPERATURE: 97 F

## 2021-06-15 DIAGNOSIS — R09.81 SINUS CONGESTION: ICD-10-CM

## 2021-06-15 DIAGNOSIS — B96.89 BACTERIAL SINUSITIS: Primary | ICD-10-CM

## 2021-06-15 DIAGNOSIS — J32.9 BACTERIAL SINUSITIS: Primary | ICD-10-CM

## 2021-06-15 LAB
CTP QC/QA: YES
SARS-COV-2 RDRP RESP QL NAA+PROBE: NEGATIVE

## 2021-06-15 PROCEDURE — U0002 COVID-19 LAB TEST NON-CDC: HCPCS | Mod: QW,S$GLB,, | Performed by: NURSE PRACTITIONER

## 2021-06-15 PROCEDURE — 3008F PR BODY MASS INDEX (BMI) DOCUMENTED: ICD-10-PCS | Mod: CPTII,S$GLB,, | Performed by: NURSE PRACTITIONER

## 2021-06-15 PROCEDURE — U0002: ICD-10-PCS | Mod: QW,S$GLB,, | Performed by: NURSE PRACTITIONER

## 2021-06-15 PROCEDURE — 99214 PR OFFICE/OUTPT VISIT, EST, LEVL IV, 30-39 MIN: ICD-10-PCS | Mod: S$GLB,,, | Performed by: NURSE PRACTITIONER

## 2021-06-15 PROCEDURE — 3008F BODY MASS INDEX DOCD: CPT | Mod: CPTII,S$GLB,, | Performed by: NURSE PRACTITIONER

## 2021-06-15 PROCEDURE — 99214 OFFICE O/P EST MOD 30 MIN: CPT | Mod: S$GLB,,, | Performed by: NURSE PRACTITIONER

## 2021-06-15 RX ORDER — FLUCONAZOLE 150 MG/1
150 TABLET ORAL
Qty: 3 TABLET | Refills: 0 | Status: SHIPPED | OUTPATIENT
Start: 2021-06-15 | End: 2023-04-24

## 2021-06-15 RX ORDER — AMOXICILLIN AND CLAVULANATE POTASSIUM 875; 125 MG/1; MG/1
1 TABLET, FILM COATED ORAL 2 TIMES DAILY
Qty: 14 TABLET | Refills: 0 | Status: SHIPPED | OUTPATIENT
Start: 2021-06-15 | End: 2021-09-30

## 2021-08-03 ENCOUNTER — PATIENT MESSAGE (OUTPATIENT)
Dept: ADMINISTRATIVE | Facility: HOSPITAL | Age: 56
End: 2021-08-03

## 2021-09-30 ENCOUNTER — OFFICE VISIT (OUTPATIENT)
Dept: INTERNAL MEDICINE | Facility: CLINIC | Age: 56
End: 2021-09-30
Payer: COMMERCIAL

## 2021-09-30 ENCOUNTER — TELEPHONE (OUTPATIENT)
Dept: INTERNAL MEDICINE | Facility: CLINIC | Age: 56
End: 2021-09-30

## 2021-09-30 VITALS
HEIGHT: 57 IN | HEART RATE: 64 BPM | TEMPERATURE: 98 F | RESPIRATION RATE: 16 BRPM | DIASTOLIC BLOOD PRESSURE: 78 MMHG | SYSTOLIC BLOOD PRESSURE: 124 MMHG | BODY MASS INDEX: 32.82 KG/M2 | WEIGHT: 152.13 LBS

## 2021-09-30 DIAGNOSIS — M54.41 RIGHT-SIDED LOW BACK PAIN WITH RIGHT-SIDED SCIATICA, UNSPECIFIED CHRONICITY: Primary | ICD-10-CM

## 2021-09-30 DIAGNOSIS — E11.9 TYPE 2 DIABETES MELLITUS WITHOUT COMPLICATION, WITHOUT LONG-TERM CURRENT USE OF INSULIN: ICD-10-CM

## 2021-09-30 DIAGNOSIS — E78.5 HYPERLIPIDEMIA, UNSPECIFIED HYPERLIPIDEMIA TYPE: ICD-10-CM

## 2021-09-30 DIAGNOSIS — S39.012A LUMBAR STRAIN, INITIAL ENCOUNTER: ICD-10-CM

## 2021-09-30 DIAGNOSIS — Z00.00 ANNUAL PHYSICAL EXAM: Primary | ICD-10-CM

## 2021-09-30 PROCEDURE — 99999 PR PBB SHADOW E&M-EST. PATIENT-LVL IV: ICD-10-PCS | Mod: PBBFAC,,, | Performed by: INTERNAL MEDICINE

## 2021-09-30 PROCEDURE — 3008F BODY MASS INDEX DOCD: CPT | Mod: CPTII,S$GLB,, | Performed by: INTERNAL MEDICINE

## 2021-09-30 PROCEDURE — 3078F DIAST BP <80 MM HG: CPT | Mod: CPTII,S$GLB,, | Performed by: INTERNAL MEDICINE

## 2021-09-30 PROCEDURE — 99214 OFFICE O/P EST MOD 30 MIN: CPT | Mod: S$GLB,,, | Performed by: INTERNAL MEDICINE

## 2021-09-30 PROCEDURE — 3074F PR MOST RECENT SYSTOLIC BLOOD PRESSURE < 130 MM HG: ICD-10-PCS | Mod: CPTII,S$GLB,, | Performed by: INTERNAL MEDICINE

## 2021-09-30 PROCEDURE — 3008F PR BODY MASS INDEX (BMI) DOCUMENTED: ICD-10-PCS | Mod: CPTII,S$GLB,, | Performed by: INTERNAL MEDICINE

## 2021-09-30 PROCEDURE — 3074F SYST BP LT 130 MM HG: CPT | Mod: CPTII,S$GLB,, | Performed by: INTERNAL MEDICINE

## 2021-09-30 PROCEDURE — 99214 PR OFFICE/OUTPT VISIT, EST, LEVL IV, 30-39 MIN: ICD-10-PCS | Mod: S$GLB,,, | Performed by: INTERNAL MEDICINE

## 2021-09-30 PROCEDURE — 3078F PR MOST RECENT DIASTOLIC BLOOD PRESSURE < 80 MM HG: ICD-10-PCS | Mod: CPTII,S$GLB,, | Performed by: INTERNAL MEDICINE

## 2021-09-30 PROCEDURE — 99999 PR PBB SHADOW E&M-EST. PATIENT-LVL IV: CPT | Mod: PBBFAC,,, | Performed by: INTERNAL MEDICINE

## 2021-09-30 RX ORDER — METHYLPREDNISOLONE 4 MG/1
TABLET ORAL
Qty: 1 PACKAGE | Refills: 0 | Status: SHIPPED | OUTPATIENT
Start: 2021-09-30 | End: 2022-09-08

## 2021-09-30 RX ORDER — IBUPROFEN 600 MG/1
600 TABLET ORAL EVERY 8 HOURS PRN
Qty: 90 TABLET | Refills: 1 | Status: SHIPPED | OUTPATIENT
Start: 2021-09-30 | End: 2021-12-14 | Stop reason: SDUPTHER

## 2021-09-30 RX ORDER — INSULIN PUMP SYRINGE, 3 ML
EACH MISCELLANEOUS
Qty: 1 EACH | Refills: 3 | Status: SHIPPED | OUTPATIENT
Start: 2021-09-30 | End: 2023-04-24 | Stop reason: SDUPTHER

## 2021-10-18 ENCOUNTER — PATIENT MESSAGE (OUTPATIENT)
Dept: ADMINISTRATIVE | Facility: HOSPITAL | Age: 56
End: 2021-10-18
Payer: COMMERCIAL

## 2021-10-20 ENCOUNTER — OFFICE VISIT (OUTPATIENT)
Dept: PSYCHIATRY | Facility: CLINIC | Age: 56
End: 2021-10-20
Payer: COMMERCIAL

## 2021-10-20 DIAGNOSIS — F41.9 ANXIETY: Primary | ICD-10-CM

## 2021-10-20 DIAGNOSIS — F98.8 ATTENTION DEFICIT DISORDER (ADD) WITHOUT HYPERACTIVITY: ICD-10-CM

## 2021-10-20 PROCEDURE — 1159F MED LIST DOCD IN RCRD: CPT | Mod: CPTII,95,, | Performed by: PSYCHIATRY & NEUROLOGY

## 2021-10-20 PROCEDURE — 90833 PSYTX W PT W E/M 30 MIN: CPT | Mod: 95,,, | Performed by: PSYCHIATRY & NEUROLOGY

## 2021-10-20 PROCEDURE — 1160F RVW MEDS BY RX/DR IN RCRD: CPT | Mod: CPTII,95,, | Performed by: PSYCHIATRY & NEUROLOGY

## 2021-10-20 PROCEDURE — 90833 PR PSYCHOTHERAPY W/PATIENT W/E&M, 30 MIN (ADD ON): ICD-10-PCS | Mod: 95,,, | Performed by: PSYCHIATRY & NEUROLOGY

## 2021-10-20 PROCEDURE — 1160F PR REVIEW ALL MEDS BY PRESCRIBER/CLIN PHARMACIST DOCUMENTED: ICD-10-PCS | Mod: CPTII,95,, | Performed by: PSYCHIATRY & NEUROLOGY

## 2021-10-20 PROCEDURE — 99214 PR OFFICE/OUTPT VISIT, EST, LEVL IV, 30-39 MIN: ICD-10-PCS | Mod: 95,,, | Performed by: PSYCHIATRY & NEUROLOGY

## 2021-10-20 PROCEDURE — 1159F PR MEDICATION LIST DOCUMENTED IN MEDICAL RECORD: ICD-10-PCS | Mod: CPTII,95,, | Performed by: PSYCHIATRY & NEUROLOGY

## 2021-10-20 PROCEDURE — 99214 OFFICE O/P EST MOD 30 MIN: CPT | Mod: 95,,, | Performed by: PSYCHIATRY & NEUROLOGY

## 2021-10-20 RX ORDER — FLUOXETINE 10 MG/1
10 CAPSULE ORAL DAILY
Qty: 30 CAPSULE | Refills: 5 | Status: SHIPPED | OUTPATIENT
Start: 2021-10-20 | End: 2021-12-28 | Stop reason: DRUGHIGH

## 2021-12-03 ENCOUNTER — OFFICE VISIT (OUTPATIENT)
Dept: URGENT CARE | Facility: CLINIC | Age: 56
End: 2021-12-03
Payer: COMMERCIAL

## 2021-12-03 VITALS
WEIGHT: 149 LBS | HEART RATE: 64 BPM | HEIGHT: 57 IN | OXYGEN SATURATION: 100 % | DIASTOLIC BLOOD PRESSURE: 74 MMHG | SYSTOLIC BLOOD PRESSURE: 168 MMHG | BODY MASS INDEX: 32.15 KG/M2 | TEMPERATURE: 98 F | RESPIRATION RATE: 16 BRPM

## 2021-12-03 DIAGNOSIS — R05.8 COUGH WITH CONGESTION OF PARANASAL SINUS: ICD-10-CM

## 2021-12-03 DIAGNOSIS — R09.81 COUGH WITH CONGESTION OF PARANASAL SINUS: ICD-10-CM

## 2021-12-03 DIAGNOSIS — J00 ACUTE NASOPHARYNGITIS: Primary | ICD-10-CM

## 2021-12-03 DIAGNOSIS — Z71.89 EDUCATED ABOUT COVID-19 VIRUS INFECTION: ICD-10-CM

## 2021-12-03 DIAGNOSIS — Z11.59 ENCOUNTER FOR SCREENING FOR OTHER VIRAL DISEASES: ICD-10-CM

## 2021-12-03 LAB
CTP QC/QA: YES
CTP QC/QA: YES
POC MOLECULAR INFLUENZA A AGN: NEGATIVE
POC MOLECULAR INFLUENZA B AGN: NEGATIVE
SARS-COV-2 RDRP RESP QL NAA+PROBE: NEGATIVE

## 2021-12-03 PROCEDURE — 87502 INFLUENZA DNA AMP PROBE: CPT | Mod: QW,S$GLB,, | Performed by: PHYSICIAN ASSISTANT

## 2021-12-03 PROCEDURE — U0002 COVID-19 LAB TEST NON-CDC: HCPCS | Mod: QW,S$GLB,, | Performed by: PHYSICIAN ASSISTANT

## 2021-12-03 PROCEDURE — 99214 PR OFFICE/OUTPT VISIT, EST, LEVL IV, 30-39 MIN: ICD-10-PCS | Mod: S$GLB,,, | Performed by: PHYSICIAN ASSISTANT

## 2021-12-03 PROCEDURE — 99214 OFFICE O/P EST MOD 30 MIN: CPT | Mod: S$GLB,,, | Performed by: PHYSICIAN ASSISTANT

## 2021-12-03 PROCEDURE — 87502 POCT INFLUENZA A/B MOLECULAR: ICD-10-PCS | Mod: QW,S$GLB,, | Performed by: PHYSICIAN ASSISTANT

## 2021-12-03 PROCEDURE — U0002: ICD-10-PCS | Mod: QW,S$GLB,, | Performed by: PHYSICIAN ASSISTANT

## 2021-12-14 ENCOUNTER — OFFICE VISIT (OUTPATIENT)
Dept: INTERNAL MEDICINE | Facility: CLINIC | Age: 56
End: 2021-12-14
Payer: COMMERCIAL

## 2021-12-14 ENCOUNTER — TELEPHONE (OUTPATIENT)
Dept: INTERNAL MEDICINE | Facility: CLINIC | Age: 56
End: 2021-12-14
Payer: COMMERCIAL

## 2021-12-14 VITALS
SYSTOLIC BLOOD PRESSURE: 118 MMHG | TEMPERATURE: 99 F | BODY MASS INDEX: 32.92 KG/M2 | WEIGHT: 152.13 LBS | HEART RATE: 64 BPM | OXYGEN SATURATION: 98 % | DIASTOLIC BLOOD PRESSURE: 78 MMHG

## 2021-12-14 DIAGNOSIS — T14.8XXA MUSCLE STRAIN: ICD-10-CM

## 2021-12-14 DIAGNOSIS — M79.89 SWELLING OF RIGHT UPPER EXTREMITY: ICD-10-CM

## 2021-12-14 DIAGNOSIS — M25.511 ACUTE PAIN OF RIGHT SHOULDER: ICD-10-CM
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
## 2021-12-14 PROCEDURE — 99999 PR PBB SHADOW E&M-EST. PATIENT-LVL III: ICD-10-PCS | Mod: PBBFAC,,, | Performed by: NURSE PRACTITIONER

## 2021-12-14 PROCEDURE — 99214 OFFICE O/P EST MOD 30 MIN: CPT | Mod: S$GLB,,, | Performed by: NURSE PRACTITIONER

## 2021-12-14 PROCEDURE — 99214 PR OFFICE/OUTPT VISIT, EST, LEVL IV, 30-39 MIN: ICD-10-PCS | Mod: S$GLB,,, | Performed by: NURSE PRACTITIONER

## 2021-12-14 PROCEDURE — 99999 PR PBB SHADOW E&M-EST. PATIENT-LVL III: CPT | Mod: PBBFAC,,, | Performed by: NURSE PRACTITIONER

## 2021-12-14 RX ORDER — METHOCARBAMOL 500 MG/1
500 TABLET, FILM COATED ORAL
Qty: 21 TABLET | Refills: 0 | Status: SHIPPED | OUTPATIENT
Start: 2021-12-14 | End: 2021-12-14

## 2021-12-14 RX ORDER — IBUPROFEN 600 MG/1
600 TABLET ORAL EVERY 8 HOURS PRN
Qty: 90 TABLET | Refills: 0 | Status: SHIPPED | OUTPATIENT
Start: 2021-12-14 | End: 2022-09-08

## 2021-12-14 RX ORDER — METHOCARBAMOL 500 MG/1
500 TABLET, FILM COATED ORAL
Qty: 21 TABLET | Refills: 0 | Status: SHIPPED | OUTPATIENT
Start: 2021-12-14 | End: 2021-12-21

## 2021-12-18 ENCOUNTER — LAB VISIT (OUTPATIENT)
Dept: LAB | Facility: HOSPITAL | Age: 56
End: 2021-12-18
Attending: INTERNAL MEDICINE
Payer: COMMERCIAL

## 2021-12-18 DIAGNOSIS — Z00.00 ANNUAL PHYSICAL EXAM: ICD-10-CM

## 2021-12-18 LAB
ALBUMIN SERPL BCP-MCNC: 3.7 G/DL (ref 3.5–5.2)
ALP SERPL-CCNC: 87 U/L (ref 55–135)
ALT SERPL W/O P-5'-P-CCNC: 9 U/L (ref 10–44)
ANION GAP SERPL CALC-SCNC: 13 MMOL/L (ref 8–16)
AST SERPL-CCNC: 16 U/L (ref 10–40)
BASOPHILS # BLD AUTO: 0.02 K/UL (ref 0–0.2)
BASOPHILS NFR BLD: 0.5 % (ref 0–1.9)
BILIRUB SERPL-MCNC: 0.4 MG/DL (ref 0.1–1)
BUN SERPL-MCNC: 11 MG/DL (ref 6–20)
CALCIUM SERPL-MCNC: 9.5 MG/DL (ref 8.7–10.5)
CHLORIDE SERPL-SCNC: 103 MMOL/L (ref 95–110)
CHOLEST SERPL-MCNC: 265 MG/DL (ref 120–199)
CHOLEST/HDLC SERPL: 6.2 {RATIO} (ref 2–5)
CO2 SERPL-SCNC: 21 MMOL/L (ref 23–29)
CREAT SERPL-MCNC: 0.8 MG/DL (ref 0.5–1.4)
DIFFERENTIAL METHOD: ABNORMAL
EOSINOPHIL # BLD AUTO: 0.1 K/UL (ref 0–0.5)
EOSINOPHIL NFR BLD: 2 % (ref 0–8)
ERYTHROCYTE [DISTWIDTH] IN BLOOD BY AUTOMATED COUNT: 13.1 % (ref 11.5–14.5)
EST. GFR  (AFRICAN AMERICAN): >60 ML/MIN/1.73 M^2
EST. GFR  (NON AFRICAN AMERICAN): >60 ML/MIN/1.73 M^2
ESTIMATED AVG GLUCOSE: 154 MG/DL (ref 68–131)
GLUCOSE SERPL-MCNC: 115 MG/DL (ref 70–110)
HBA1C MFR BLD: 7 % (ref 4–5.6)
HCT VFR BLD AUTO: 33.9 % (ref 37–48.5)
HDLC SERPL-MCNC: 43 MG/DL (ref 40–75)
HDLC SERPL: 16.2 % (ref 20–50)
HGB BLD-MCNC: 12.1 G/DL (ref 12–16)
IMM GRANULOCYTES # BLD AUTO: 0.01 K/UL (ref 0–0.04)
IMM GRANULOCYTES NFR BLD AUTO: 0.2 % (ref 0–0.5)
LDLC SERPL CALC-MCNC: 205.4 MG/DL (ref 63–159)
LYMPHOCYTES # BLD AUTO: 1.6 K/UL (ref 1–4.8)
LYMPHOCYTES NFR BLD: 37 % (ref 18–48)
MCH RBC QN AUTO: 32.8 PG (ref 27–31)
MCHC RBC AUTO-ENTMCNC: 35.7 G/DL (ref 32–36)
MCV RBC AUTO: 92 FL (ref 82–98)
MONOCYTES # BLD AUTO: 0.3 K/UL (ref 0.3–1)
MONOCYTES NFR BLD: 6.3 % (ref 4–15)
NEUTROPHILS # BLD AUTO: 2.4 K/UL (ref 1.8–7.7)
NEUTROPHILS NFR BLD: 54 % (ref 38–73)
NONHDLC SERPL-MCNC: 222 MG/DL
NRBC BLD-RTO: 0 /100 WBC
PLATELET # BLD AUTO: 368 K/UL (ref 150–450)
PMV BLD AUTO: 10.6 FL (ref 9.2–12.9)
POTASSIUM SERPL-SCNC: 4 MMOL/L (ref 3.5–5.1)
PROT SERPL-MCNC: 7.7 G/DL (ref 6–8.4)
RBC # BLD AUTO: 3.69 M/UL (ref 4–5.4)
SODIUM SERPL-SCNC: 137 MMOL/L (ref 136–145)
TRIGL SERPL-MCNC: 83 MG/DL (ref 30–150)
TSH SERPL DL<=0.005 MIU/L-ACNC: 1.01 UIU/ML (ref 0.4–4)
WBC # BLD AUTO: 4.41 K/UL (ref 3.9–12.7)

## 2021-12-18 PROCEDURE — 84443 ASSAY THYROID STIM HORMONE: CPT | Performed by: INTERNAL MEDICINE

## 2021-12-18 PROCEDURE — 83036 HEMOGLOBIN GLYCOSYLATED A1C: CPT | Performed by: INTERNAL MEDICINE

## 2021-12-18 PROCEDURE — 36415 COLL VENOUS BLD VENIPUNCTURE: CPT | Mod: PO | Performed by: INTERNAL MEDICINE

## 2021-12-18 PROCEDURE — 80061 LIPID PANEL: CPT | Performed by: INTERNAL MEDICINE

## 2021-12-18 PROCEDURE — 80053 COMPREHEN METABOLIC PANEL: CPT | Performed by: INTERNAL MEDICINE

## 2021-12-18 PROCEDURE — 85025 COMPLETE CBC W/AUTO DIFF WBC: CPT | Performed by: INTERNAL MEDICINE

## 2021-12-28 ENCOUNTER — OFFICE VISIT (OUTPATIENT)
Dept: INTERNAL MEDICINE | Facility: CLINIC | Age: 56
End: 2021-12-28
Payer: COMMERCIAL

## 2021-12-28 ENCOUNTER — OFFICE VISIT (OUTPATIENT)
Dept: PSYCHIATRY | Facility: CLINIC | Age: 56
End: 2021-12-28
Payer: COMMERCIAL

## 2021-12-28 VITALS
SYSTOLIC BLOOD PRESSURE: 132 MMHG | DIASTOLIC BLOOD PRESSURE: 68 MMHG | TEMPERATURE: 98 F | HEART RATE: 68 BPM | HEIGHT: 57 IN | RESPIRATION RATE: 16 BRPM | WEIGHT: 152.13 LBS | BODY MASS INDEX: 32.82 KG/M2

## 2021-12-28 DIAGNOSIS — E11.9 TYPE 2 DIABETES MELLITUS WITHOUT COMPLICATION, WITHOUT LONG-TERM CURRENT USE OF INSULIN: ICD-10-CM

## 2021-12-28 DIAGNOSIS — Z00.00 WELL ADULT EXAM: Primary | ICD-10-CM

## 2021-12-28 DIAGNOSIS — E78.49 OTHER HYPERLIPIDEMIA: ICD-10-CM

## 2021-12-28 DIAGNOSIS — F41.9 ANXIETY: Primary | ICD-10-CM

## 2021-12-28 PROCEDURE — 3078F DIAST BP <80 MM HG: CPT | Mod: CPTII,S$GLB,, | Performed by: INTERNAL MEDICINE

## 2021-12-28 PROCEDURE — 3008F BODY MASS INDEX DOCD: CPT | Mod: CPTII,S$GLB,, | Performed by: INTERNAL MEDICINE

## 2021-12-28 PROCEDURE — 3066F PR DOCUMENTATION OF TREATMENT FOR NEPHROPATHY: ICD-10-PCS | Mod: CPTII,95,, | Performed by: PSYCHIATRY & NEUROLOGY

## 2021-12-28 PROCEDURE — 99213 PR OFFICE/OUTPT VISIT, EST, LEVL III, 20-29 MIN: ICD-10-PCS | Mod: 95,,, | Performed by: PSYCHIATRY & NEUROLOGY

## 2021-12-28 PROCEDURE — 99999 PR PBB SHADOW E&M-EST. PATIENT-LVL III: ICD-10-PCS | Mod: PBBFAC,,, | Performed by: INTERNAL MEDICINE

## 2021-12-28 PROCEDURE — 99396 PR PREVENTIVE VISIT,EST,40-64: ICD-10-PCS | Mod: 25,S$GLB,, | Performed by: INTERNAL MEDICINE

## 2021-12-28 PROCEDURE — 3061F NEG MICROALBUMINURIA REV: CPT | Mod: CPTII,95,, | Performed by: PSYCHIATRY & NEUROLOGY

## 2021-12-28 PROCEDURE — 3075F PR MOST RECENT SYSTOLIC BLOOD PRESS GE 130-139MM HG: ICD-10-PCS | Mod: CPTII,S$GLB,, | Performed by: INTERNAL MEDICINE

## 2021-12-28 PROCEDURE — 99396 PREV VISIT EST AGE 40-64: CPT | Mod: 25,S$GLB,, | Performed by: INTERNAL MEDICINE

## 2021-12-28 PROCEDURE — 90471 IMMUNIZATION ADMIN: CPT | Mod: S$GLB,,, | Performed by: INTERNAL MEDICINE

## 2021-12-28 PROCEDURE — 99999 PR PBB SHADOW E&M-EST. PATIENT-LVL III: CPT | Mod: PBBFAC,,, | Performed by: INTERNAL MEDICINE

## 2021-12-28 PROCEDURE — 90686 FLU VACCINE (QUAD) GREATER THAN OR EQUAL TO 3YO PRESERVATIVE FREE IM: ICD-10-PCS | Mod: S$GLB,,, | Performed by: INTERNAL MEDICINE

## 2021-12-28 PROCEDURE — 3078F PR MOST RECENT DIASTOLIC BLOOD PRESSURE < 80 MM HG: ICD-10-PCS | Mod: CPTII,S$GLB,, | Performed by: INTERNAL MEDICINE

## 2021-12-28 PROCEDURE — 99213 OFFICE O/P EST LOW 20 MIN: CPT | Mod: 95,,, | Performed by: PSYCHIATRY & NEUROLOGY

## 2021-12-28 PROCEDURE — 1160F PR REVIEW ALL MEDS BY PRESCRIBER/CLIN PHARMACIST DOCUMENTED: ICD-10-PCS | Mod: CPTII,95,, | Performed by: PSYCHIATRY & NEUROLOGY

## 2021-12-28 PROCEDURE — 3008F PR BODY MASS INDEX (BMI) DOCUMENTED: ICD-10-PCS | Mod: CPTII,S$GLB,, | Performed by: INTERNAL MEDICINE

## 2021-12-28 PROCEDURE — 90833 PSYTX W PT W E/M 30 MIN: CPT | Mod: 95,,, | Performed by: PSYCHIATRY & NEUROLOGY

## 2021-12-28 PROCEDURE — 3051F HG A1C>EQUAL 7.0%<8.0%: CPT | Mod: CPTII,S$GLB,, | Performed by: INTERNAL MEDICINE

## 2021-12-28 PROCEDURE — 3051F PR MOST RECENT HEMOGLOBIN A1C LEVEL 7.0 - < 8.0%: ICD-10-PCS | Mod: CPTII,95,, | Performed by: PSYCHIATRY & NEUROLOGY

## 2021-12-28 PROCEDURE — 90686 IIV4 VACC NO PRSV 0.5 ML IM: CPT | Mod: S$GLB,,, | Performed by: INTERNAL MEDICINE

## 2021-12-28 PROCEDURE — 1159F MED LIST DOCD IN RCRD: CPT | Mod: CPTII,95,, | Performed by: PSYCHIATRY & NEUROLOGY

## 2021-12-28 PROCEDURE — 3051F HG A1C>EQUAL 7.0%<8.0%: CPT | Mod: CPTII,95,, | Performed by: PSYCHIATRY & NEUROLOGY

## 2021-12-28 PROCEDURE — 3075F SYST BP GE 130 - 139MM HG: CPT | Mod: CPTII,S$GLB,, | Performed by: INTERNAL MEDICINE

## 2021-12-28 PROCEDURE — 3051F PR MOST RECENT HEMOGLOBIN A1C LEVEL 7.0 - < 8.0%: ICD-10-PCS | Mod: CPTII,S$GLB,, | Performed by: INTERNAL MEDICINE

## 2021-12-28 PROCEDURE — 1159F PR MEDICATION LIST DOCUMENTED IN MEDICAL RECORD: ICD-10-PCS | Mod: CPTII,95,, | Performed by: PSYCHIATRY & NEUROLOGY

## 2021-12-28 PROCEDURE — 1160F RVW MEDS BY RX/DR IN RCRD: CPT | Mod: CPTII,95,, | Performed by: PSYCHIATRY & NEUROLOGY

## 2021-12-28 PROCEDURE — 3061F PR NEG MICROALBUMINURIA RESULT DOCUMENTED/REVIEW: ICD-10-PCS | Mod: CPTII,95,, | Performed by: PSYCHIATRY & NEUROLOGY

## 2021-12-28 PROCEDURE — 3066F NEPHROPATHY DOC TX: CPT | Mod: CPTII,95,, | Performed by: PSYCHIATRY & NEUROLOGY

## 2021-12-28 PROCEDURE — 90833 PR PSYCHOTHERAPY W/PATIENT W/E&M, 30 MIN (ADD ON): ICD-10-PCS | Mod: 95,,, | Performed by: PSYCHIATRY & NEUROLOGY

## 2021-12-28 PROCEDURE — 90471 FLU VACCINE (QUAD) GREATER THAN OR EQUAL TO 3YO PRESERVATIVE FREE IM: ICD-10-PCS | Mod: S$GLB,,, | Performed by: INTERNAL MEDICINE

## 2021-12-28 RX ORDER — ROSUVASTATIN CALCIUM 20 MG/1
20 TABLET, COATED ORAL NIGHTLY
Qty: 90 TABLET | Refills: 3 | Status: SHIPPED | OUTPATIENT
Start: 2021-12-28 | End: 2023-04-24 | Stop reason: SDUPTHER

## 2021-12-28 RX ORDER — METFORMIN HYDROCHLORIDE 500 MG/1
500 TABLET ORAL
Qty: 90 TABLET | Refills: 3 | Status: SHIPPED | OUTPATIENT
Start: 2021-12-28 | End: 2023-04-24 | Stop reason: SINTOL

## 2021-12-28 RX ORDER — FLUOXETINE HYDROCHLORIDE 20 MG/1
20 CAPSULE ORAL DAILY
Qty: 30 CAPSULE | Refills: 5 | Status: SHIPPED | OUTPATIENT
Start: 2021-12-28 | End: 2022-04-21 | Stop reason: SDUPTHER

## 2021-12-29 ENCOUNTER — PATIENT MESSAGE (OUTPATIENT)
Dept: INTERNAL MEDICINE | Facility: CLINIC | Age: 56
End: 2021-12-29
Payer: COMMERCIAL

## 2021-12-29 ENCOUNTER — LAB VISIT (OUTPATIENT)
Dept: LAB | Facility: HOSPITAL | Age: 56
End: 2021-12-29
Attending: INTERNAL MEDICINE
Payer: COMMERCIAL

## 2021-12-29 DIAGNOSIS — E11.9 TYPE 2 DIABETES MELLITUS WITHOUT COMPLICATION, WITHOUT LONG-TERM CURRENT USE OF INSULIN: ICD-10-CM

## 2021-12-29 DIAGNOSIS — E78.49 OTHER HYPERLIPIDEMIA: ICD-10-CM

## 2021-12-29 DIAGNOSIS — Z00.00 WELL ADULT EXAM: ICD-10-CM

## 2021-12-29 DIAGNOSIS — E78.5 HYPERLIPIDEMIA, UNSPECIFIED HYPERLIPIDEMIA TYPE: ICD-10-CM

## 2021-12-29 LAB
FERRITIN SERPL-MCNC: 85 NG/ML (ref 20–300)
VIT B12 SERPL-MCNC: 476 PG/ML (ref 210–950)

## 2021-12-29 PROCEDURE — 83010 ASSAY OF HAPTOGLOBIN QUANT: CPT | Performed by: INTERNAL MEDICINE

## 2021-12-29 PROCEDURE — 83540 ASSAY OF IRON: CPT | Performed by: INTERNAL MEDICINE

## 2021-12-29 PROCEDURE — 82728 ASSAY OF FERRITIN: CPT | Performed by: INTERNAL MEDICINE

## 2021-12-29 PROCEDURE — 82607 VITAMIN B-12: CPT | Performed by: INTERNAL MEDICINE

## 2021-12-29 PROCEDURE — 36415 COLL VENOUS BLD VENIPUNCTURE: CPT | Mod: PO | Performed by: INTERNAL MEDICINE

## 2021-12-30 LAB
HAPTOGLOB SERPL-MCNC: 188 MG/DL (ref 30–250)
IRON SERPL-MCNC: 63 UG/DL (ref 30–160)
SATURATED IRON: 17 % (ref 20–50)
TOTAL IRON BINDING CAPACITY: 371 UG/DL (ref 250–450)
TRANSFERRIN SERPL-MCNC: 251 MG/DL (ref 200–375)

## 2022-01-03 ENCOUNTER — OFFICE VISIT (OUTPATIENT)
Dept: URGENT CARE | Facility: CLINIC | Age: 57
End: 2022-01-03
Payer: COMMERCIAL

## 2022-01-03 VITALS
OXYGEN SATURATION: 97 % | HEART RATE: 66 BPM | TEMPERATURE: 98 F | HEIGHT: 57 IN | RESPIRATION RATE: 18 BRPM | BODY MASS INDEX: 32.36 KG/M2 | WEIGHT: 150 LBS | DIASTOLIC BLOOD PRESSURE: 84 MMHG | SYSTOLIC BLOOD PRESSURE: 151 MMHG

## 2022-01-03 DIAGNOSIS — U07.1 COVID-19 VIRUS INFECTION: Primary | ICD-10-CM

## 2022-01-03 DIAGNOSIS — U07.1 COVID-19 VIRUS DETECTED: ICD-10-CM

## 2022-01-03 LAB
CTP QC/QA: YES
SARS-COV-2 RDRP RESP QL NAA+PROBE: POSITIVE

## 2022-01-03 PROCEDURE — 1160F PR REVIEW ALL MEDS BY PRESCRIBER/CLIN PHARMACIST DOCUMENTED: ICD-10-PCS | Mod: CPTII,S$GLB,, | Performed by: FAMILY MEDICINE

## 2022-01-03 PROCEDURE — U0002: ICD-10-PCS | Mod: QW,S$GLB,, | Performed by: FAMILY MEDICINE

## 2022-01-03 PROCEDURE — U0002 COVID-19 LAB TEST NON-CDC: HCPCS | Mod: QW,S$GLB,, | Performed by: FAMILY MEDICINE

## 2022-01-03 PROCEDURE — 3077F SYST BP >= 140 MM HG: CPT | Mod: CPTII,S$GLB,, | Performed by: FAMILY MEDICINE

## 2022-01-03 PROCEDURE — 1160F RVW MEDS BY RX/DR IN RCRD: CPT | Mod: CPTII,S$GLB,, | Performed by: FAMILY MEDICINE

## 2022-01-03 PROCEDURE — 3077F PR MOST RECENT SYSTOLIC BLOOD PRESSURE >= 140 MM HG: ICD-10-PCS | Mod: CPTII,S$GLB,, | Performed by: FAMILY MEDICINE

## 2022-01-03 PROCEDURE — 99213 PR OFFICE/OUTPT VISIT, EST, LEVL III, 20-29 MIN: ICD-10-PCS | Mod: S$GLB,,, | Performed by: FAMILY MEDICINE

## 2022-01-03 PROCEDURE — 3008F PR BODY MASS INDEX (BMI) DOCUMENTED: ICD-10-PCS | Mod: CPTII,S$GLB,, | Performed by: FAMILY MEDICINE

## 2022-01-03 PROCEDURE — 3079F DIAST BP 80-89 MM HG: CPT | Mod: CPTII,S$GLB,, | Performed by: FAMILY MEDICINE

## 2022-01-03 PROCEDURE — 1159F MED LIST DOCD IN RCRD: CPT | Mod: CPTII,S$GLB,, | Performed by: FAMILY MEDICINE

## 2022-01-03 PROCEDURE — 99213 OFFICE O/P EST LOW 20 MIN: CPT | Mod: S$GLB,,, | Performed by: FAMILY MEDICINE

## 2022-01-03 PROCEDURE — 1159F PR MEDICATION LIST DOCUMENTED IN MEDICAL RECORD: ICD-10-PCS | Mod: CPTII,S$GLB,, | Performed by: FAMILY MEDICINE

## 2022-01-03 PROCEDURE — 3079F PR MOST RECENT DIASTOLIC BLOOD PRESSURE 80-89 MM HG: ICD-10-PCS | Mod: CPTII,S$GLB,, | Performed by: FAMILY MEDICINE

## 2022-01-03 PROCEDURE — 3008F BODY MASS INDEX DOCD: CPT | Mod: CPTII,S$GLB,, | Performed by: FAMILY MEDICINE

## 2022-01-03 NOTE — PATIENT INSTRUCTIONS
Patient Education       COVID-19 Discharge Instructions   About this topic   Coronavirus disease 2019 is also known as COVID-19. It is a viral illness that infects the lungs. It is caused by a virus called SARS-associated coronavirus (SARS-CoV-2).  The signs of COVID-19 most often start a few days after you have been infected. In some people, it takes longer to show signs. Others never show signs of the infection. You may have a cough, fever, shaking chills and it may be hard to breathe. You may be very tired, have muscle aches, a headache or sore throat. Some people have an upset stomach or loose stools. Others lose their sense of smell or taste. You may not have these signs all the time and they may come and go while you are sick.  The virus spreads easily through droplets when you talk, sneeze, or cough. You can pass the virus to others when you are talking close together, singing, hugging, sharing food, or shaking hands. Doctors believe the germs also survive on surfaces like tables, door handles, and telephones. However, this is not a common way that COVID-19 spreads. Doctors believe you can also spread the infection even if you dont have any symptoms, but they do not know how that happens. This is why getting vaccinated is one of the best ways to keep you healthy and slow the spread of the virus.  Some people have a mild case of COVID-19 and are able to stay at home and away from others until they feel better. Others may need to be in the hospital if they are very sick. Some people with COVID-19 can have some symptoms for weeks or months. People with COVID-19 must isolate themselves. You can start to be around others when your doctor says it is safe to do so.       What care is needed at home?   · Ask your doctor what you need to do when you go home. Make sure you ask questions if you do not understand what the doctor says.  · Drink lots of water, juice, or broth to replace fluids lost from a fever.  · You  may use cool mist humidifiers to help ease congestion and coughing.  · Use 2 to 3 pillows to prop yourself up when you lie down to make it easier to breathe and sleep.  · Do not smoke and do not drink beer, wine, and mixed drinks (alcohol).  · To lower the chance of passing the infection to others, get a COVID-19 vaccine after your infection has resolved.  · If you have not been fully vaccinated:  ? Wear a mask over your mouth and nose if you are around others who are not sick. Cloth masks work best if they have more than one layer of fabric.  ? Wash your hands often.  ? Stay home in a separate room, if possible, away from others. Only go out to get medical care.  ? Use a separate bathroom if possible.  ? Do not make food for others.  What follow-up care is needed?   · Your doctor may ask you to make visits to the office to check on your progress. Be sure to keep these visits. Make sure you wear a mask at these visits.  · If you can, tell the staff you have COVID-19 ahead of time so they can take extra care to stop the disease from spreading.  · It may take a few weeks before your health returns to normal.  What drugs may be needed?   The doctor may order drugs to:  · Help with breathing  · Help with fever  · Help with swelling in your airways and lungs  · Control coughing  · Ease a sore throat  · Help a runny or stuffy nose  Will physical activity be limited?   You may have to limit your physical activity. Talk to your doctor about the right amount of activity for you. If you have been very sick with COVID-19, it can take some time to get your strength back.  Will there be any other care needed?   Doctors do not know how long you can pass the virus on to others after you are sick. This is why it is important to stay in a separate room, if possible, when you are sick. For now, doctors are giving general guidelines for you to follow after you have been sick. Before you go around other people, you should:  · Be fever  free for 24 hours without taking any drugs to lower the fever  · Have no symptoms of cough or shortness of breath  · Wait at least 10 days after first having symptoms or your first positive test, and you need to be symptom free as above. Some experts suggest waiting 20 days if you have had a more severe infection.  Talk with your doctor about getting a COVID-19 vaccine.  What problems could happen?   · Fluid loss. This is dehydration.  · Short-term or long-term lung damage  · Heart problems  · Death  When do I need to call the doctor?   · You are having so much trouble breathing that you can only say one or two words at a time.  · You need to sit upright at all times to be able to breathe and/or cannot lie down.  · You are very confused or cannot stay awake.  · Your lips or skin start to turn blue or grey.  · You think you might be having a medical emergency. Some examples of medical emergencies are:  ? Severe chest pain.  ? Not able to speak or move normally.  · You have trouble breathing when talking or sitting still.  · You have new shortness of breath.  · You become weak or dizzy.  · You have very dark urine or do not pass urine for more than 8 hours.  · You have new or worsening COVID-19 symptoms like:  ? Fever  ? Cough  ? Feeling very tired  ? Shaking chills  ? Headache  ? Trouble swallowing  ? Throwing up  ? Loose stools  ? Reddish purple spots on your fingers or toes  Teach Back: Helping You Understand   The Teach Back Method helps you understand the information we are giving you. After you talk with the staff, tell them in your own words what you learned. This helps to make sure the staff has described each thing clearly. It also helps to explain things that may have been confusing. Before going home, make sure you can do these:  · I can tell you about my condition.  · I can tell you what may help ease my breathing.  · I can tell you what I can do to help avoid passing the infection to others.  · I can tell  you what I will do if I have trouble breathing; feel sleepy or confused; or my fingertips, fingernails, skin, or lips are blue.  Where can I learn more?   Centers for Disease Control and Prevention  https://www.cdc.gov/coronavirus/2019-ncov/about/index.html   Centers for Disease Control and Prevention  https://www.cdc.gov/coronavirus/2019-ncov/hcp/disposition-in-home-patients.html   World Health Organization  https://www.who.int/news-room/q-a-detail/d-e-seqwpkypssteq   Last Reviewed Date   2021-10-05  Consumer Information Use and Disclaimer   This information is not specific medical advice and does not replace information you receive from your health care provider. This is only a brief summary of general information. It does NOT include all information about conditions, illnesses, injuries, tests, procedures, treatments, therapies, discharge instructions or life-style choices that may apply to you. You must talk with your health care provider for complete information about your health and treatment options. This information should not be used to decide whether or not to accept your health care providers advice, instructions or recommendations. Only your health care provider has the knowledge and training to provide advice that is right for you.  Copyright   Copyright © 2021 [employer] and its affiliates and/or licensors. All rights reserved.

## 2022-01-03 NOTE — PROGRESS NOTES
"Subjective:       Patient ID: Joanne Stallings is a 56 y.o. female.    Vitals:  height is 4' 9" (1.448 m) and weight is 68 kg (150 lb). Her temperature is 98.4 °F (36.9 °C). Her blood pressure is 151/84 (abnormal) and her pulse is 66. Her respiration is 18 and oxygen saturation is 97%.     Chief Complaint: COVID-19 Concerns    Patient c/o symptoms since thursday    Cough  This is a new problem. The current episode started in the past 7 days. The problem has been gradually worsening. The cough is non-productive. Associated symptoms include myalgias, postnasal drip and a sore throat. Treatments tried: theraflu. The treatment provided no relief.       Constitution: Positive for fatigue.   HENT: Positive for congestion, postnasal drip and sore throat.    Respiratory: Positive for cough.    Musculoskeletal: Positive for muscle ache.       Objective:      Physical Exam   Constitutional: She does not appear ill. No distress. normal  HENT:   Head: Normocephalic and atraumatic.   Nose: Congestion present.   Mouth/Throat: Mucous membranes are moist. No posterior oropharyngeal erythema.   Eyes: Pupils are equal, round, and reactive to light.      extraocular movement intact   Cardiovascular: Normal rate, regular rhythm, normal heart sounds and normal pulses.   Pulmonary/Chest: Effort normal and breath sounds normal.   Abdominal: Normal appearance.   Neurological: She is alert.     Results for orders placed or performed in visit on 01/03/22   POCT COVID-19 Rapid Screening   Result Value Ref Range    POC Rapid COVID Positive (A) Negative     Acceptable Yes          Assessment:       1. COVID-19 virus infection          Plan:         COVID-19 virus infection  -     POCT COVID-19 Rapid Screening    declines EUA antibody infusion. Discussed isolation period, contact notification and symptom monitoring. ER precautions reviewed.                  "

## 2022-01-25 ENCOUNTER — PATIENT MESSAGE (OUTPATIENT)
Dept: PSYCHIATRY | Facility: CLINIC | Age: 57
End: 2022-01-25
Payer: COMMERCIAL

## 2022-01-25 ENCOUNTER — PATIENT MESSAGE (OUTPATIENT)
Dept: ADMINISTRATIVE | Facility: HOSPITAL | Age: 57
End: 2022-01-25
Payer: COMMERCIAL

## 2022-01-25 RX ORDER — ALPRAZOLAM 0.25 MG/1
TABLET ORAL
Qty: 10 TABLET | Refills: 0 | Status: SHIPPED | OUTPATIENT
Start: 2022-01-25 | End: 2022-01-25 | Stop reason: SDUPTHER

## 2022-01-25 RX ORDER — ALPRAZOLAM 0.25 MG/1
TABLET ORAL
Qty: 10 TABLET | Refills: 0 | Status: SHIPPED | OUTPATIENT
Start: 2022-01-25 | End: 2023-04-24

## 2022-01-30 ENCOUNTER — PATIENT MESSAGE (OUTPATIENT)
Dept: PSYCHIATRY | Facility: CLINIC | Age: 57
End: 2022-01-30
Payer: COMMERCIAL

## 2022-02-03 ENCOUNTER — PATIENT MESSAGE (OUTPATIENT)
Dept: PSYCHIATRY | Facility: CLINIC | Age: 57
End: 2022-02-03
Payer: COMMERCIAL

## 2022-02-11 ENCOUNTER — PATIENT MESSAGE (OUTPATIENT)
Dept: PSYCHIATRY | Facility: CLINIC | Age: 57
End: 2022-02-11
Payer: COMMERCIAL

## 2022-02-24 NOTE — PROGRESS NOTES
Subjective:       Patient ID: Joanne Stallings is a 56 y.o. female.    Chief Complaint: Annual Exam    HPI   The patient presents for annual physical examination and follow-up.  The patient reports she is not using her medications for diabetes and hyperlipidemia.  Dietary compliance has been fair.  No regular exercise is reported.    Immunization record was reviewed.  The patient is due for influenza vaccine.  The patient reports she is scheduled to get her COVID -19 booster shot soon.    Screening tests were reviewed.    No interval change in past medical history, family history, or social history since prior evaluations.    Review of Systems   Constitutional: Negative for activity change, appetite change, fatigue, fever and unexpected weight change.   HENT: Negative for nasal congestion, postnasal drip, rhinorrhea and sore throat.    Eyes: Negative for visual disturbance.   Respiratory: Negative for cough, chest tightness, shortness of breath and wheezing.    Cardiovascular: Negative for chest pain, palpitations and leg swelling.   Gastrointestinal: Negative for abdominal pain, blood in stool and diarrhea.   Genitourinary: Negative for dysuria, frequency, hematuria and urgency.   Musculoskeletal: Negative for arthralgias, back pain, joint swelling and myalgias.   Integumentary:  Negative for rash.   Neurological: Positive for numbness (Bilateral hand numbness is noted.). Negative for dizziness, syncope, weakness and headaches.   Psychiatric/Behavioral: Negative for sleep disturbance. The patient is not nervous/anxious.             Physical Exam  Vitals and nursing note reviewed.   Constitutional:       General: She is not in acute distress.     Appearance: She is well-developed.   HENT:      Head: Normocephalic and atraumatic.      Right Ear: External ear normal.      Left Ear: External ear normal.      Nose: Nose normal.      Mouth/Throat:      Pharynx: No oropharyngeal exudate.   Eyes:      General: No  scleral icterus.     Conjunctiva/sclera: Conjunctivae normal.      Pupils: Pupils are equal, round, and reactive to light.   Neck:      Thyroid: No thyromegaly.      Vascular: No carotid bruit or JVD.   Cardiovascular:      Rate and Rhythm: Normal rate and regular rhythm.      Pulses: Normal pulses.      Heart sounds: Normal heart sounds. No murmur heard.    No friction rub. No gallop.   Pulmonary:      Effort: Pulmonary effort is normal. No respiratory distress.      Breath sounds: Normal breath sounds. No wheezing or rales.   Chest:   Breasts:      Right: No supraclavicular adenopathy.      Left: No supraclavicular adenopathy.       Abdominal:      General: Bowel sounds are normal. There is no abdominal bruit.      Palpations: Abdomen is soft. There is no hepatomegaly, splenomegaly or mass.      Tenderness: There is no abdominal tenderness.      Hernia: No hernia is present.   Musculoskeletal:         General: No tenderness. Normal range of motion.      Right shoulder: No deformity or effusion.      Cervical back: Normal range of motion and neck supple.   Lymphadenopathy:      Cervical: No cervical adenopathy.      Upper Body:      Right upper body: No supraclavicular adenopathy.      Left upper body: No supraclavicular adenopathy.   Skin:     General: Skin is warm and dry.      Findings: No rash.      Comments: No foot lesions are present.   Neurological:      Mental Status: She is alert and oriented to person, place, and time.      Cranial Nerves: No cranial nerve deficit.      Comments: Sensory exam is intact in both feet on monofilament  testing.   Psychiatric:         Speech: Speech normal.         Behavior: Behavior normal.         Protective Sensation (w/ 10 gram monofilament):  Right: Intact  Left: Intact    Visual Inspection:  Normal -  Bilateral    Pedal Pulses:   Right: Present  Left: Present    Posterior tibialis:   Right:Present  Left: Present         Lab Visit on 12/18/2021   Component Date Value Ref  Range Status    Sodium 12/18/2021 137  136 - 145 mmol/L Final    Potassium 12/18/2021 4.0  3.5 - 5.1 mmol/L Final    Chloride 12/18/2021 103  95 - 110 mmol/L Final    CO2 12/18/2021 21 (A) 23 - 29 mmol/L Final    Glucose 12/18/2021 115 (A) 70 - 110 mg/dL Final    BUN 12/18/2021 11  6 - 20 mg/dL Final    Creatinine 12/18/2021 0.8  0.5 - 1.4 mg/dL Final    Calcium 12/18/2021 9.5  8.7 - 10.5 mg/dL Final    Total Protein 12/18/2021 7.7  6.0 - 8.4 g/dL Final    Albumin 12/18/2021 3.7  3.5 - 5.2 g/dL Final    Total Bilirubin 12/18/2021 0.4  0.1 - 1.0 mg/dL Final    Comment: For infants and newborns, interpretation of results should be based  on gestational age, weight and in agreement with clinical  observations.    Premature Infant recommended reference ranges:  Up to 24 hours.............<8.0 mg/dL  Up to 48 hours............<12.0 mg/dL  3-5 days..................<15.0 mg/dL  6-29 days.................<15.0 mg/dL      Alkaline Phosphatase 12/18/2021 87  55 - 135 U/L Final    AST 12/18/2021 16  10 - 40 U/L Final    ALT 12/18/2021 9 (A) 10 - 44 U/L Final    Anion Gap 12/18/2021 13  8 - 16 mmol/L Final    eGFR if African American 12/18/2021 >60.0  >60 mL/min/1.73 m^2 Final    eGFR if non African American 12/18/2021 >60.0  >60 mL/min/1.73 m^2 Final    Comment: Calculation used to obtain the estimated glomerular filtration  rate (eGFR) is the CKD-EPI equation.       Cholesterol 12/18/2021 265 (A) 120 - 199 mg/dL Final    Comment: The National Cholesterol Education Program (NCEP) has set the  following guidelines (reference ranges) for Cholesterol:  Optimal.....................<200 mg/dL  Borderline High.............200-239 mg/dL  High........................> or = 240 mg/dL      Triglycerides 12/18/2021 83  30 - 150 mg/dL Final    Comment: The National Cholesterol Education Program (NCEP) has set the  following guidelines (reference values) for triglycerides:  Normal......................<150  mg/dL  Borderline High.............150-199 mg/dL  High........................200-499 mg/dL      HDL 12/18/2021 43  40 - 75 mg/dL Final    Comment: The National Cholesterol Education Program (NCEP) has set the  following guidelines (reference values) for HDL Cholesterol:  Low...............<40 mg/dL  Optimal...........>60 mg/dL      LDL Cholesterol 12/18/2021 205.4 (A) 63.0 - 159.0 mg/dL Final    Comment: The National Cholesterol Education Program (NCEP) has set the  following guidelines (reference values) for LDL Cholesterol:  Optimal.......................<130 mg/dL  Borderline High...............130-159 mg/dL  High..........................160-189 mg/dL  Very High.....................>190 mg/dL      HDL/Cholesterol Ratio 12/18/2021 16.2 (A) 20.0 - 50.0 % Final    Total Cholesterol/HDL Ratio 12/18/2021 6.2 (A) 2.0 - 5.0 Final    Non-HDL Cholesterol 12/18/2021 222  mg/dL Final    Comment: Risk category and Non-HDL cholesterol goals:  Coronary heart disease (CHD)or equivalent (10-year risk of CHD >20%):  Non-HDL cholesterol goal     <130 mg/dL  Two or more CHD risk factors and 10-year risk of CHD <= 20%:  Non-HDL cholesterol goal     <160 mg/dL  0 to 1 CHD risk factor:  Non-HDL cholesterol goal     <190 mg/dL      WBC 12/18/2021 4.41  3.90 - 12.70 K/uL Final    RBC 12/18/2021 3.69 (A) 4.00 - 5.40 M/uL Final    Hemoglobin 12/18/2021 12.1  12.0 - 16.0 g/dL Final    Hematocrit 12/18/2021 33.9 (A) 37.0 - 48.5 % Final    MCV 12/18/2021 92  82 - 98 fL Final    MCH 12/18/2021 32.8 (A) 27.0 - 31.0 pg Final    MCHC 12/18/2021 35.7  32.0 - 36.0 g/dL Final    RDW 12/18/2021 13.1  11.5 - 14.5 % Final    Platelets 12/18/2021 368  150 - 450 K/uL Final    MPV 12/18/2021 10.6  9.2 - 12.9 fL Final    Immature Granulocytes 12/18/2021 0.2  0.0 - 0.5 % Final    Gran # (ANC) 12/18/2021 2.4  1.8 - 7.7 K/uL Final    Immature Grans (Abs) 12/18/2021 0.01  0.00 - 0.04 K/uL Final    Comment: Mild elevation in immature  granulocytes is non specific and   can be seen in a variety of conditions including stress response,   acute inflammation, trauma and pregnancy. Correlation with other   laboratory and clinical findings is essential.      Lymph # 12/18/2021 1.6  1.0 - 4.8 K/uL Final    Mono # 12/18/2021 0.3  0.3 - 1.0 K/uL Final    Eos # 12/18/2021 0.1  0.0 - 0.5 K/uL Final    Baso # 12/18/2021 0.02  0.00 - 0.20 K/uL Final    nRBC 12/18/2021 0  0 /100 WBC Final    Gran % 12/18/2021 54.0  38.0 - 73.0 % Final    Lymph % 12/18/2021 37.0  18.0 - 48.0 % Final    Mono % 12/18/2021 6.3  4.0 - 15.0 % Final    Eosinophil % 12/18/2021 2.0  0.0 - 8.0 % Final    Basophil % 12/18/2021 0.5  0.0 - 1.9 % Final    Differential Method 12/18/2021 Automated   Final    TSH 12/18/2021 1.010  0.400 - 4.000 uIU/mL Final    Hemoglobin A1C 12/18/2021 7.0 (A) 4.0 - 5.6 % Final    Comment: ADA Screening Guidelines:  5.7-6.4%  Consistent with prediabetes  >or=6.5%  Consistent with diabetes    High levels of fetal hemoglobin interfere with the HbA1C  assay. Heterozygous hemoglobin variants (HbS, HgC, etc)do  not significantly interfere with this assay.   However, presence of multiple variants may affect accuracy.      Estimated Avg Glucose 12/18/2021 154 (A) 68 - 131 mg/dL Final   Lab Visit on 12/18/2021   Component Date Value Ref Range Status    Specimen UA 12/18/2021 Urine, Clean Catch   Final    Color, UA 12/18/2021 Yellow  Yellow, Straw, Asha Final    Appearance, UA 12/18/2021 Hazy (A) Clear Final    pH, UA 12/18/2021 6.0  5.0 - 8.0 Final    Specific Gravity, UA 12/18/2021 1.025  1.005 - 1.030 Final    Protein, UA 12/18/2021 Negative  Negative Final    Comment: Recommend a 24 hour urine protein or a urine   protein/creatinine ratio if globulin induced proteinuria is  clinically suspected.      Glucose, UA 12/18/2021 Negative  Negative Final    Ketones, UA 12/18/2021 Negative  Negative Final    Bilirubin (UA) 12/18/2021 Negative   Negative Final    Occult Blood UA 12/18/2021 1+ (A) Negative Final    Nitrite, UA 12/18/2021 Negative  Negative Final    Leukocytes, UA 12/18/2021 Negative  Negative Final    Microalbumin, Urine 12/18/2021 <5.0  ug/mL Final    Creatinine, Urine 12/18/2021 206.0  15.0 - 325.0 mg/dL Final    Microalb/Creat Ratio 12/18/2021 Unable to calculate  0.0 - 30.0 ug/mg Final    RBC, UA 12/18/2021 3  0 - 4 /hpf Final    WBC, UA 12/18/2021 2  0 - 5 /hpf Final    Squam Epithel, UA 12/18/2021 2  /hpf Final    Microscopic Comment 12/18/2021 SEE COMMENT   Final    Comment: Other formed elements not mentioned in the report are not   present in the microscopic examination.      Office Visit on 12/03/2021   Component Date Value Ref Range Status    POC Rapid COVID 12/03/2021 Negative  Negative Final     Acceptable 12/03/2021 Yes   Final    POC Molecular Influenza A Ag 12/03/2021 Negative  Negative, Not Reported Final    POC Molecular Influenza B Ag 12/03/2021 Negative  Negative, Not Reported Final     Acceptable 12/03/2021 Yes   Final       Assessment & Plan:      Joanne was seen today for annual exam.  Additional anemia workup will be obtained.    Influenza vaccine will be administered today.  The patient plans get her COVID -19 booster as scheduled.    Metformin and rosuvastatin will be reordered for daily use.    The patient was encouraged to use wrist splints at bedtime for early symptoms of carpal tunnel syndrome.    Fasting blood tests will be repeated in 4 months.    Diagnoses and all orders for this visit:    Well adult exam  -     Iron and TIBC; Future  -     Ferritin; Future  -     Vitamin B12; Future  -     HAPTOGLOBIN; Future    Type 2 diabetes mellitus without complication, without long-term current use of insulin  -     metFORMIN (GLUCOPHAGE) 500 MG tablet; Take 1 tablet (500 mg total) by mouth before dinner.  -     rosuvastatin (CRESTOR) 20 MG tablet; Take 1 tablet (20 mg total)  by mouth every evening.  -     Iron and TIBC; Future  -     Ferritin; Future  -     Vitamin B12; Future  -     HAPTOGLOBIN; Future  -     Comprehensive Metabolic Panel; Future  -     Lipid Panel; Future  -     Hemoglobin A1C; Future    Other hyperlipidemia  -     Iron and TIBC; Future  -     Ferritin; Future  -     Vitamin B12; Future  -     HAPTOGLOBIN; Future  -     Comprehensive Metabolic Panel; Future  -     Lipid Panel; Future  -     Hemoglobin A1C; Future    Other orders  -     Influenza - Quadrivalent (PF)         Follow up in about 4 months (around 4/28/2022).     Alonso Phillips MD

## 2022-03-23 ENCOUNTER — PATIENT MESSAGE (OUTPATIENT)
Dept: PSYCHIATRY | Facility: CLINIC | Age: 57
End: 2022-03-23
Payer: COMMERCIAL

## 2022-03-24 ENCOUNTER — OFFICE VISIT (OUTPATIENT)
Dept: URGENT CARE | Facility: CLINIC | Age: 57
End: 2022-03-24
Payer: COMMERCIAL

## 2022-03-24 VITALS
HEIGHT: 57 IN | RESPIRATION RATE: 20 BRPM | OXYGEN SATURATION: 98 % | BODY MASS INDEX: 32.36 KG/M2 | WEIGHT: 150 LBS | SYSTOLIC BLOOD PRESSURE: 124 MMHG | HEART RATE: 64 BPM | DIASTOLIC BLOOD PRESSURE: 78 MMHG | TEMPERATURE: 97 F

## 2022-03-24 DIAGNOSIS — I88.9 CERVICAL LYMPHADENITIS: ICD-10-CM

## 2022-03-24 DIAGNOSIS — R07.9 CHEST PAIN, UNSPECIFIED TYPE: Primary | ICD-10-CM

## 2022-03-24 DIAGNOSIS — F41.9 ANXIETY: ICD-10-CM

## 2022-03-24 DIAGNOSIS — G47.00 INSOMNIA, UNSPECIFIED TYPE: ICD-10-CM

## 2022-03-24 PROCEDURE — 93010 EKG 12-LEAD: ICD-10-PCS | Mod: S$GLB,,, | Performed by: INTERNAL MEDICINE

## 2022-03-24 PROCEDURE — 3008F PR BODY MASS INDEX (BMI) DOCUMENTED: ICD-10-PCS | Mod: CPTII,S$GLB,, | Performed by: PHYSICIAN ASSISTANT

## 2022-03-24 PROCEDURE — 3074F SYST BP LT 130 MM HG: CPT | Mod: CPTII,S$GLB,, | Performed by: PHYSICIAN ASSISTANT

## 2022-03-24 PROCEDURE — 93005 EKG 12-LEAD: ICD-10-PCS | Mod: S$GLB,,, | Performed by: PHYSICIAN ASSISTANT

## 2022-03-24 PROCEDURE — 1160F PR REVIEW ALL MEDS BY PRESCRIBER/CLIN PHARMACIST DOCUMENTED: ICD-10-PCS | Mod: CPTII,S$GLB,, | Performed by: PHYSICIAN ASSISTANT

## 2022-03-24 PROCEDURE — 3008F BODY MASS INDEX DOCD: CPT | Mod: CPTII,S$GLB,, | Performed by: PHYSICIAN ASSISTANT

## 2022-03-24 PROCEDURE — 1160F RVW MEDS BY RX/DR IN RCRD: CPT | Mod: CPTII,S$GLB,, | Performed by: PHYSICIAN ASSISTANT

## 2022-03-24 PROCEDURE — 3078F PR MOST RECENT DIASTOLIC BLOOD PRESSURE < 80 MM HG: ICD-10-PCS | Mod: CPTII,S$GLB,, | Performed by: PHYSICIAN ASSISTANT

## 2022-03-24 PROCEDURE — 99215 PR OFFICE/OUTPT VISIT, EST, LEVL V, 40-54 MIN: ICD-10-PCS | Mod: S$GLB,,, | Performed by: PHYSICIAN ASSISTANT

## 2022-03-24 PROCEDURE — 3078F DIAST BP <80 MM HG: CPT | Mod: CPTII,S$GLB,, | Performed by: PHYSICIAN ASSISTANT

## 2022-03-24 PROCEDURE — 1159F PR MEDICATION LIST DOCUMENTED IN MEDICAL RECORD: ICD-10-PCS | Mod: CPTII,S$GLB,, | Performed by: PHYSICIAN ASSISTANT

## 2022-03-24 PROCEDURE — 93005 ELECTROCARDIOGRAM TRACING: CPT | Mod: S$GLB,,, | Performed by: PHYSICIAN ASSISTANT

## 2022-03-24 PROCEDURE — 93010 ELECTROCARDIOGRAM REPORT: CPT | Mod: S$GLB,,, | Performed by: INTERNAL MEDICINE

## 2022-03-24 PROCEDURE — 99215 OFFICE O/P EST HI 40 MIN: CPT | Mod: S$GLB,,, | Performed by: PHYSICIAN ASSISTANT

## 2022-03-24 PROCEDURE — 3074F PR MOST RECENT SYSTOLIC BLOOD PRESSURE < 130 MM HG: ICD-10-PCS | Mod: CPTII,S$GLB,, | Performed by: PHYSICIAN ASSISTANT

## 2022-03-24 PROCEDURE — 1159F MED LIST DOCD IN RCRD: CPT | Mod: CPTII,S$GLB,, | Performed by: PHYSICIAN ASSISTANT

## 2022-03-24 RX ORDER — DOXYCYCLINE 100 MG/1
100 CAPSULE ORAL
Qty: 14 CAPSULE | Refills: 0 | Status: SHIPPED | OUTPATIENT
Start: 2022-03-24 | End: 2023-04-24

## 2022-03-24 RX ORDER — AMITRIPTYLINE HYDROCHLORIDE 10 MG/1
10 TABLET, FILM COATED ORAL NIGHTLY PRN
Qty: 30 TABLET | Refills: 1 | Status: SHIPPED | OUTPATIENT
Start: 2022-03-24 | End: 2022-04-20

## 2022-03-24 NOTE — LETTER
March 24, 2022      [facility]  [address] [address]  Phone: [phone]  Fax: [phone]       Patient: Joanne Stallings   YOB: 1965  Date of Visit: 03/24/2022    To Whom It May Concern:    Tony Stallings  was at [facility] on 03/24/2022. The patient may return to work/school on March 25, 2022 with no restrictions. If you have any questions or concerns, or if I can be of further assistance, please do not hesitate to contact me.    Sincerely,    CLAUDE Regalado

## 2022-03-24 NOTE — PROGRESS NOTES
Subjective:       Patient ID: Joanne Stallings is a 56 y.o. female.    Chief Complaint: No chief complaint on file.    HPI  ROS     Objective:      Physical Exam    Assessment:       No diagnosis found.    Plan:                   No follow-ups on file.

## 2022-03-24 NOTE — LETTER
March 24, 2022      [facility]  [address] [address]  Phone: [phone]  Fax: [phone]       Patient: Joanne Stallings   YOB: 1965  Date of Visit: 03/24/2022    To Whom It May Concern:    Tony Stallings  was at [facility] on 03/24/2022. The patient may return to work/school on March 25, 2022 with no restrictions. If you have any questions or concerns, or if I can be of further assistance, please do not hesitate to contact me.    Sincerely,    CLAUDE Regalado

## 2022-03-24 NOTE — PROGRESS NOTES
"Subjective:       Patient ID: Joanne Stallings is a 56 y.o. female.    Vitals:  height is 4' 9" (1.448 m) and weight is 68 kg (150 lb). Her temperature is 97.1 °F (36.2 °C). Her blood pressure is 124/78 and her pulse is 64. Her respiration is 20 and oxygen saturation is 98%.     Chief Complaint: Chest Pain and Otalgia    Pt states she started to have chest pains at 11 am this am, she states the pain is descried as a heaviness that radiated to her left breast, she has had this before, and got sob.  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 . Sx started yesterday   CHEST PAIN WAS NOT ASSOCIATED WITH NAUSEA OF VOMITING AND NO DIAPHORESIS      Chest Pain   This is a new problem. The current episode started today. The problem occurs every several days. The problem has been gradually worsening. The quality of the pain is described as heavy. Radiates to: radiates toher left breast    Otalgia   There is pain in the right ear. This is a new problem. The current episode started yesterday.       HENT: Positive for ear pain.    Cardiovascular: Positive for chest pain.   Psychiatric/Behavioral:        Under a lot of stress and anxious slightly       Objective:      Physical Exam   Constitutional: She is oriented to person, place, and time. She appears well-developed. She is cooperative.  Non-toxic appearance. She does not appear ill. No distress.   HENT:   Head: Normocephalic and atraumatic.   Ears:   Right Ear: Hearing, tympanic membrane, external ear and ear canal normal.   Left Ear: Hearing, tympanic membrane, external ear and ear canal normal.   Nose: Nose normal. No mucosal edema, rhinorrhea, nasal deformity or congestion. No " epistaxis. Right sinus exhibits no maxillary sinus tenderness and no frontal sinus tenderness. Left sinus exhibits no maxillary sinus tenderness and no frontal sinus tenderness.   Mouth/Throat: Uvula is midline, oropharynx is clear and moist and mucous membranes are normal. No trismus in the jaw. Normal dentition. No uvula swelling. No oropharyngeal exudate, posterior oropharyngeal edema or posterior oropharyngeal erythema.   Eyes: Conjunctivae and lids are normal. No scleral icterus.   Neck: Trachea normal and phonation normal. Neck supple. No edema present. No erythema present. No neck rigidity present.   Cardiovascular: Normal rate, regular rhythm, normal heart sounds and normal pulses.   Pulmonary/Chest: Effort normal. No respiratory distress. She has no decreased breath sounds. She has no wheezes. She has no rhonchi. She has no rales.   Abdominal: Normal appearance.   Musculoskeletal: Normal range of motion.         General: No deformity. Normal range of motion.   Neurological: She is alert and oriented to person, place, and time. She exhibits normal muscle tone. Coordination normal.   Skin: Skin is warm, dry, intact, not diaphoretic and not pale.   Psychiatric: Her speech is normal and behavior is normal. Judgment and thought content normal.   Nursing note and vitals reviewed.  EKG  Interpreted by me  Normal sinus rhythm with no acute changes suggestive of ischemia.      Assessment:       1. Chest pain, unspecified type    2. Insomnia, unspecified type    3. Anxiety    4. Cervical lymphadenitis          Plan:         Chest pain, unspecified type  -     IN OFFICE EKG 12-LEAD (to Muse)  -     IN OFFICE EKG 12-LEAD (to Muse)    Insomnia, unspecified type  -     amitriptyline (ELAVIL) 10 MG tablet; Take 1 tablet (10 mg total) by mouth nightly as needed for Insomnia.  Dispense: 30 tablet; Refill: 1  -     Ambulatory referral/consult to Internal Medicine    Anxiety  -     Ambulatory referral/consult to Internal  Medicine    Cervical lymphadenitis  -     doxycycline (VIBRAMYCIN) 100 MG Cap; Take 1 capsule (100 mg total) by mouth 2 times daily 2 hours after meal.  Dispense: 14 capsule; Refill: 0  -     Ambulatory referral/consult to Internal Medicine     Follow up if symptoms worsen or fail to improve, for F/U with PCP or ED. There are no Patient Instructions on file for this visit.

## 2022-04-17 DIAGNOSIS — G47.00 INSOMNIA, UNSPECIFIED TYPE: ICD-10-CM

## 2022-04-20 RX ORDER — AMITRIPTYLINE HYDROCHLORIDE 10 MG/1
TABLET, FILM COATED ORAL
Qty: 30 TABLET | Refills: 1 | Status: SHIPPED | OUTPATIENT
Start: 2022-04-20 | End: 2022-04-21 | Stop reason: DRUGHIGH

## 2022-04-21 ENCOUNTER — OFFICE VISIT (OUTPATIENT)
Dept: PSYCHIATRY | Facility: CLINIC | Age: 57
End: 2022-04-21
Payer: COMMERCIAL

## 2022-04-21 DIAGNOSIS — F41.0 PANIC DISORDER: Primary | ICD-10-CM

## 2022-04-21 PROCEDURE — 90833 PSYTX W PT W E/M 30 MIN: CPT | Mod: 95,,, | Performed by: PSYCHIATRY & NEUROLOGY

## 2022-04-21 PROCEDURE — 99213 PR OFFICE/OUTPT VISIT, EST, LEVL III, 20-29 MIN: ICD-10-PCS | Mod: 95,,, | Performed by: PSYCHIATRY & NEUROLOGY

## 2022-04-21 PROCEDURE — 1160F RVW MEDS BY RX/DR IN RCRD: CPT | Mod: CPTII,95,, | Performed by: PSYCHIATRY & NEUROLOGY

## 2022-04-21 PROCEDURE — 90833 PR PSYCHOTHERAPY W/PATIENT W/E&M, 30 MIN (ADD ON): ICD-10-PCS | Mod: 95,,, | Performed by: PSYCHIATRY & NEUROLOGY

## 2022-04-21 PROCEDURE — 99213 OFFICE O/P EST LOW 20 MIN: CPT | Mod: 95,,, | Performed by: PSYCHIATRY & NEUROLOGY

## 2022-04-21 PROCEDURE — 1159F MED LIST DOCD IN RCRD: CPT | Mod: CPTII,95,, | Performed by: PSYCHIATRY & NEUROLOGY

## 2022-04-21 PROCEDURE — 1159F PR MEDICATION LIST DOCUMENTED IN MEDICAL RECORD: ICD-10-PCS | Mod: CPTII,95,, | Performed by: PSYCHIATRY & NEUROLOGY

## 2022-04-21 PROCEDURE — 1160F PR REVIEW ALL MEDS BY PRESCRIBER/CLIN PHARMACIST DOCUMENTED: ICD-10-PCS | Mod: CPTII,95,, | Performed by: PSYCHIATRY & NEUROLOGY

## 2022-04-21 RX ORDER — FLUOXETINE 10 MG/1
CAPSULE ORAL
Qty: 30 CAPSULE | Refills: 5 | Status: SHIPPED | OUTPATIENT
Start: 2022-04-21 | End: 2023-04-24

## 2022-04-21 RX ORDER — FLUOXETINE HYDROCHLORIDE 20 MG/1
20 CAPSULE ORAL DAILY
Qty: 30 CAPSULE | Refills: 5 | Status: SHIPPED | OUTPATIENT
Start: 2022-04-21 | End: 2022-05-21

## 2022-04-21 RX ORDER — AMITRIPTYLINE HYDROCHLORIDE 10 MG/1
20 TABLET, FILM COATED ORAL NIGHTLY PRN
Qty: 60 TABLET | Refills: 5 | Status: SHIPPED | OUTPATIENT
Start: 2022-04-21 | End: 2023-04-24

## 2022-04-21 NOTE — PROGRESS NOTES
"Outpatient Psychiatry Follow-Up Visit (MD/NP)    4/21/2022 The patient location is: work in   The chief complaint leading to consultation is: anxiety and relatred mood difficulty.    Visit type: audiovisual    Face to Face time with patient: 20"(2" on meds and 18" for supportive counseling and update of history and mental status)  22 minutes of total time spent on the encounter, which includes face to face time and non-face to face time preparing to see the patient (eg, review of tests), Obtaining and/or reviewing separately obtained history, Documenting clinical information in the electronic or other health record, Independently interpreting results (not separately reported) and communicating results to the patient/family/caregiver, or Care coordination (not separately reported).         Each patient to whom he or she provides medical services by telemedicine is:  (1) informed of the relationship between the physician and patient and the respective role of any other health care provider with respect to management of the patient; and (2) notified that he or she may decline to receive medical services by telemedicine and may withdraw from such care at any time.    Notes:See below.     Clinical Status of Patient:  Outpatient (Ambulatory)    Chief Complaint:  Joanne Stallings is a 56 y.o. female who presents today for follow-up of depression and anxiety.  Met with patient and no relatives present for visit.      Interval History and Content of Current Session:  Interim Events/Subjective Report/Content of Current Session: Patient has had a panic attack since our last visit. Patient is in good self control and has no thoughts of harming herself nor signs of veda or psychosis. We discussed coping with her stressors. Work is stressful as well, and she can feel overwhelmed at times. Patient is caring for 4 children at home and that "is a lot also" for patient to care for. We discussed my [facility]. Patient has a good " support group and system. Patient is also having trouble sleeping throughout the night. Patient is determined to arrange a schedule for herself which will allow her to recuperate from the stresses in her life at this time.    Psychotherapy:  · Target symptoms: anxiety , panic episodes  · Why chosen therapy is appropriate versus another modality: relevant to diagnosis, patient responds to this modality, evidence based practice  · Outcome monitoring methods: self-report  · Therapeutic intervention type: supportive psychotherapy  · Topics discussed/themes: panic episodes and related mood changes, work stress, parenting issues  · The patient's response to the intervention is accepting. The patient's progress toward treatment goals is good.   · Duration of intervention: 20 minutes.    Review of Systems   · PSYCHIATRIC: Pertinant items are noted in the narrative.    Past Medical, Family and Social History: The patient's past medical, family and social history have been reviewed and updated as appropriate within the electronic medical record - see encounter notes.    Compliance: yes    Side effects: None    Risk Parameters:  Patient reports no suicidal ideation  Patient reports no homicidal ideation  Patient reports no self-injurious behavior  Patient reports no violent behavior    Exam (detailed: at least 9 elements; comprehensive: all 15 elements)   Constitutional  Vitals:  Most recent vital signs, dated less than 90 days prior to this appointment, were reviewed.   There were no vitals filed for this visit. Patient reports stable vital signs     General:  unremarkable, age appropriate, casually dressed, neatly groomed     Musculoskeletal  Muscle Strength/Tone:  patient reports adequate muscle tone and strength   Gait & Station:  non-ataxic     Psychiatric  Speech:  no latency; no press, spontaneous   Mood & Affect:  anxious  congruent and appropriate   Thought Process:  normal and logical   Associations:  intact    Thought Content:  normal, no suicidality, no homicidality, delusions, or paranoia   Insight:  has awareness of illness   Judgement: behavior is adequate to circumstances   Orientation:  grossly intact   Memory: intact for content of interview   Language: grossly intact   Attention Span & Concentration:  able to focus   Fund of Knowledge:  not tested     Assessment and Diagnosis   Status/Progress: Based on the examination today, the patient's problem(s) is/are improved.  New problems have not been presented today.   stress of work and caring for multiple children without a break are complicating management of the primary condition.  The working differential for this patient includes anxiety and related mood changes.     General Impression: Patient is improving and taking better care of herself at this time,and setting up a schedule to allow her more free time to recover from her obligations. Patient is in good self control and thinking positively and hopefully re the future.      ICD-10-CM ICD-9-CM   1. Anxiety  F41.9 300.00       Intervention/Counseling/Treatment Plan   · Medication Management: The risks and benefits of medication were discussed with the patient. Patient agrees to increase Elavil to 20 mg q hs vs sleep issue, and to also increase Prozac to 30 mg q am vs anxiety, panic, and mood issues.      Return to Clinic: 3 months

## 2022-06-01 ENCOUNTER — PATIENT MESSAGE (OUTPATIENT)
Dept: ADMINISTRATIVE | Facility: HOSPITAL | Age: 57
End: 2022-06-01
Payer: COMMERCIAL

## 2022-06-21 ENCOUNTER — OFFICE VISIT (OUTPATIENT)
Dept: OPTOMETRY | Facility: CLINIC | Age: 57
End: 2022-06-21
Payer: COMMERCIAL

## 2022-06-21 DIAGNOSIS — H52.13 MYOPIA WITH PRESBYOPIA OF BOTH EYES: ICD-10-CM

## 2022-06-21 DIAGNOSIS — Z97.3 WEARS CONTACT LENSES: ICD-10-CM

## 2022-06-21 DIAGNOSIS — E11.9 TYPE 2 DIABETES MELLITUS WITHOUT RETINOPATHY: Primary | ICD-10-CM

## 2022-06-21 DIAGNOSIS — H35.411 LATTICE DEGENERATION OF PERIPHERAL RETINA, RIGHT: ICD-10-CM

## 2022-06-21 DIAGNOSIS — H52.4 MYOPIA WITH PRESBYOPIA OF BOTH EYES: ICD-10-CM

## 2022-06-21 DIAGNOSIS — Z46.0 ENCOUNTER FOR FITTING OR ADJUSTMENT OF SPECTACLES OR CONTACT LENSES: Primary | ICD-10-CM

## 2022-06-21 PROBLEM — H52.7 REFRACTIVE ERROR: Status: ACTIVE | Noted: 2022-06-21

## 2022-06-21 PROCEDURE — 99499 NO LOS: ICD-10-PCS | Mod: S$GLB,,, | Performed by: OPTOMETRIST

## 2022-06-21 PROCEDURE — 99999 PR PBB SHADOW E&M-EST. PATIENT-LVL I: ICD-10-PCS | Mod: PBBFAC,,, | Performed by: OPTOMETRIST

## 2022-06-21 PROCEDURE — 2023F DILAT RTA XM W/O RTNOPTHY: CPT | Mod: CPTII,S$GLB,, | Performed by: OPTOMETRIST

## 2022-06-21 PROCEDURE — 2023F PR DILATED RETINAL EXAM W/O EVID OF RETINOPATHY: ICD-10-PCS | Mod: CPTII,S$GLB,, | Performed by: OPTOMETRIST

## 2022-06-21 PROCEDURE — 92015 DETERMINE REFRACTIVE STATE: CPT | Mod: S$GLB,,, | Performed by: OPTOMETRIST

## 2022-06-21 PROCEDURE — 1160F PR REVIEW ALL MEDS BY PRESCRIBER/CLIN PHARMACIST DOCUMENTED: ICD-10-PCS | Mod: CPTII,S$GLB,, | Performed by: OPTOMETRIST

## 2022-06-21 PROCEDURE — 1159F PR MEDICATION LIST DOCUMENTED IN MEDICAL RECORD: ICD-10-PCS | Mod: CPTII,S$GLB,, | Performed by: OPTOMETRIST

## 2022-06-21 PROCEDURE — 92014 COMPRE OPH EXAM EST PT 1/>: CPT | Mod: S$GLB,,, | Performed by: OPTOMETRIST

## 2022-06-21 PROCEDURE — 1160F RVW MEDS BY RX/DR IN RCRD: CPT | Mod: CPTII,S$GLB,, | Performed by: OPTOMETRIST

## 2022-06-21 PROCEDURE — 92310 CONTACT LENS FITTING OU: CPT | Mod: CSM,,, | Performed by: OPTOMETRIST

## 2022-06-21 PROCEDURE — 1159F MED LIST DOCD IN RCRD: CPT | Mod: CPTII,S$GLB,, | Performed by: OPTOMETRIST

## 2022-06-21 PROCEDURE — 99999 PR PBB SHADOW E&M-EST. PATIENT-LVL III: ICD-10-PCS | Mod: PBBFAC,,, | Performed by: OPTOMETRIST

## 2022-06-21 PROCEDURE — 99999 PR PBB SHADOW E&M-EST. PATIENT-LVL III: CPT | Mod: PBBFAC,,, | Performed by: OPTOMETRIST

## 2022-06-21 PROCEDURE — 99499 UNLISTED E&M SERVICE: CPT | Mod: S$GLB,,, | Performed by: OPTOMETRIST

## 2022-06-21 PROCEDURE — 99999 PR PBB SHADOW E&M-EST. PATIENT-LVL I: CPT | Mod: PBBFAC,,, | Performed by: OPTOMETRIST

## 2022-06-21 PROCEDURE — 92310 PR CONTACT LENS FITTING (NO CHANGE): ICD-10-PCS | Mod: CSM,,, | Performed by: OPTOMETRIST

## 2022-06-21 PROCEDURE — 92014 PR EYE EXAM, EST PATIENT,COMPREHESV: ICD-10-PCS | Mod: S$GLB,,, | Performed by: OPTOMETRIST

## 2022-06-21 PROCEDURE — 92015 PR REFRACTION: ICD-10-PCS | Mod: S$GLB,,, | Performed by: OPTOMETRIST

## 2022-06-21 NOTE — PROGRESS NOTES
Subjective:       Patient ID: Joanne Stallings is a 56 y.o. female      Chief Complaint   Patient presents with    Concerns About Ocular Health    Contact Lens Follow Up    Diabetic Eye Exam     History of Present Illness  Dls: 7/21/20 Dr. Box     55 y/o female presents today for diabetic eye exam.   Pt c/o blurry vision at distance and near ou.   Pt wears scls and single vision glasses.     LBS ?     + tearing  No itching  No burning  No pain  No ha's  No floaters  No flashes    Eye meds  None    Hemoglobin A1C       Date                     Value               Ref Range             Status                12/18/2021               7.0 (H)             4.0 - 5.6 %           Final                 08/11/2020               6.7 (H)             4.0 - 5.6 %           Final                  07/12/2019               6.7 (H)             4.0 - 5.6 %           Final                   Air Optix. Replacing monthly. Does not sleep in lenses.         Assessment/Plan:     1. Type 2 diabetes mellitus without retinopathy  No diabetic retinopathy. Discussed with pt the effects of diabetes on vision, importance of good blood sugar control, compliance with meds, and follow up care with PCP. Return in 1 year for dilated eye exam, sooner PRN.    2. Lattice degeneration of peripheral retina, right  Stable. No new changes. Monitor.     3. Myopia with presbyopia of both eyes  Educated patient on refractive error and discussed lens options. Dispensed updated spectacle Rx. Educated about adaptation period to new specs.     Eyeglass Final Rx     Eyeglass Final Rx       Sphere Cylinder    Right -7.00 Sphere    Left -6.25 Sphere    Type: SVL    Expiration Date: 6/21/2023                4. Wears contact lenses  Pt prefers DVO lenses with readers PRN. Contact lens Rx released to pt. Daily wear only advised, replacement monthly with education to risks of extended wear. Okay to order if happy with comfort and VA. Discussed lens care,  compliance and solutions. RTC yearly contact lens health check.     Contact Lens Final Rx     Final Contact Lens Rx       Brand Base Curve Diameter Sphere Cylinder    Right Air Optix Colors 8.6 14.2 -6.50 Sphere    Left Air Optix Colors 8.6 14.2 -6.00 Sphere    Expiration Date: 6/21/2023    Replacement: Monthly    Solutions: OptiFree PureMoist    Wearing Schedule: Daily Wear                Follow up in about 1 year (around 6/21/2023) for Diabetic Eye Exam, Contact Lens.

## 2022-07-12 ENCOUNTER — PATIENT MESSAGE (OUTPATIENT)
Dept: ADMINISTRATIVE | Facility: HOSPITAL | Age: 57
End: 2022-07-12
Payer: COMMERCIAL

## 2022-08-01 ENCOUNTER — PATIENT MESSAGE (OUTPATIENT)
Dept: OPTOMETRY | Facility: CLINIC | Age: 57
End: 2022-08-01
Payer: COMMERCIAL

## 2022-08-25 ENCOUNTER — PATIENT MESSAGE (OUTPATIENT)
Dept: ADMINISTRATIVE | Facility: HOSPITAL | Age: 57
End: 2022-08-25
Payer: COMMERCIAL

## 2022-09-05 ENCOUNTER — OFFICE VISIT (OUTPATIENT)
Dept: URGENT CARE | Facility: CLINIC | Age: 57
End: 2022-09-05
Payer: COMMERCIAL

## 2022-09-05 VITALS
DIASTOLIC BLOOD PRESSURE: 88 MMHG | SYSTOLIC BLOOD PRESSURE: 158 MMHG | WEIGHT: 150 LBS | TEMPERATURE: 98 F | BODY MASS INDEX: 32.36 KG/M2 | HEART RATE: 68 BPM | OXYGEN SATURATION: 95 % | HEIGHT: 57 IN | RESPIRATION RATE: 16 BRPM

## 2022-09-05 DIAGNOSIS — M54.41 ACUTE BILATERAL LOW BACK PAIN WITH RIGHT-SIDED SCIATICA: Primary | ICD-10-CM

## 2022-09-05 PROCEDURE — 1159F MED LIST DOCD IN RCRD: CPT | Mod: CPTII,S$GLB,, | Performed by: PHYSICIAN ASSISTANT

## 2022-09-05 PROCEDURE — 96372 THER/PROPH/DIAG INJ SC/IM: CPT | Mod: S$GLB,,, | Performed by: PHYSICIAN ASSISTANT

## 2022-09-05 PROCEDURE — 96372 PR INJECTION,THERAP/PROPH/DIAG2ST, IM OR SUBCUT: ICD-10-PCS | Mod: S$GLB,,, | Performed by: PHYSICIAN ASSISTANT

## 2022-09-05 PROCEDURE — 1160F PR REVIEW ALL MEDS BY PRESCRIBER/CLIN PHARMACIST DOCUMENTED: ICD-10-PCS | Mod: CPTII,S$GLB,, | Performed by: PHYSICIAN ASSISTANT

## 2022-09-05 PROCEDURE — 3079F DIAST BP 80-89 MM HG: CPT | Mod: CPTII,S$GLB,, | Performed by: PHYSICIAN ASSISTANT

## 2022-09-05 PROCEDURE — 3008F BODY MASS INDEX DOCD: CPT | Mod: CPTII,S$GLB,, | Performed by: PHYSICIAN ASSISTANT

## 2022-09-05 PROCEDURE — 3008F PR BODY MASS INDEX (BMI) DOCUMENTED: ICD-10-PCS | Mod: CPTII,S$GLB,, | Performed by: PHYSICIAN ASSISTANT

## 2022-09-05 PROCEDURE — 1159F PR MEDICATION LIST DOCUMENTED IN MEDICAL RECORD: ICD-10-PCS | Mod: CPTII,S$GLB,, | Performed by: PHYSICIAN ASSISTANT

## 2022-09-05 PROCEDURE — 3077F PR MOST RECENT SYSTOLIC BLOOD PRESSURE >= 140 MM HG: ICD-10-PCS | Mod: CPTII,S$GLB,, | Performed by: PHYSICIAN ASSISTANT

## 2022-09-05 PROCEDURE — 1160F RVW MEDS BY RX/DR IN RCRD: CPT | Mod: CPTII,S$GLB,, | Performed by: PHYSICIAN ASSISTANT

## 2022-09-05 PROCEDURE — 99213 PR OFFICE/OUTPT VISIT, EST, LEVL III, 20-29 MIN: ICD-10-PCS | Mod: 25,S$GLB,, | Performed by: PHYSICIAN ASSISTANT

## 2022-09-05 PROCEDURE — 3077F SYST BP >= 140 MM HG: CPT | Mod: CPTII,S$GLB,, | Performed by: PHYSICIAN ASSISTANT

## 2022-09-05 PROCEDURE — 3079F PR MOST RECENT DIASTOLIC BLOOD PRESSURE 80-89 MM HG: ICD-10-PCS | Mod: CPTII,S$GLB,, | Performed by: PHYSICIAN ASSISTANT

## 2022-09-05 PROCEDURE — 99213 OFFICE O/P EST LOW 20 MIN: CPT | Mod: 25,S$GLB,, | Performed by: PHYSICIAN ASSISTANT

## 2022-09-05 RX ORDER — HYDROCODONE BITARTRATE AND ACETAMINOPHEN 5; 325 MG/1; MG/1
1 TABLET ORAL EVERY 8 HOURS PRN
Qty: 6 TABLET | Refills: 0 | Status: SHIPPED | OUTPATIENT
Start: 2022-09-05 | End: 2022-09-08 | Stop reason: SDUPTHER

## 2022-09-05 RX ORDER — METHOCARBAMOL 750 MG/1
750 TABLET, FILM COATED ORAL 3 TIMES DAILY
Qty: 20 TABLET | Refills: 0 | Status: SHIPPED | OUTPATIENT
Start: 2022-09-05 | End: 2022-09-08

## 2022-09-05 RX ORDER — PHENTERMINE HYDROCHLORIDE 37.5 MG/1
18.75 TABLET ORAL 2 TIMES DAILY
COMMUNITY
Start: 2022-07-09 | End: 2023-04-24

## 2022-09-05 RX ORDER — KETOROLAC TROMETHAMINE 30 MG/ML
30 INJECTION, SOLUTION INTRAMUSCULAR; INTRAVENOUS
Status: COMPLETED | OUTPATIENT
Start: 2022-09-05 | End: 2022-09-05

## 2022-09-05 RX ADMIN — KETOROLAC TROMETHAMINE 30 MG: 30 INJECTION, SOLUTION INTRAMUSCULAR; INTRAVENOUS at 01:09

## 2022-09-05 NOTE — PATIENT INSTRUCTIONS
You must understand that you've received an Urgent Care treatment only and that you may be released before all your medical problems are known or treated. You, the patient, will arrange for follow up care as instructed.      Follow up with your PCP or specialty clinic as instructed in the next 2-3 days if not improved or as needed. You can call [phone] to schedule an appointment with appropriate provider.      If you condition worsens, we recommend that you receive another evaluation at the emergency room immediately or contact your primary medical clinic's after hours call service to discuss your concerns.      Please return here or go to the Emergency Department for any concerns or worsening condition.      If you were prescribed a narcotic or controlled substance, do not drive or operate heavy equipment or machinery while taking these medications.

## 2022-09-05 NOTE — LETTER
September 5, 2022      [facility]  [address] [address]  Phone: [phone]  Fax: [phone]       Patient: Joanne Stallings   YOB: 1965  Date of Visit: 09/05/2022    To Whom It May Concern:    Tony Stallings  was at [facility] on 09/05/2022. She may return to work/school on 9/7/2022 with no restrictions. If you have any questions or concerns, or if I can be of further assistance, please do not hesitate to contact me.    Sincerely,    Quynh Langley PA-C
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
## 2022-09-08 ENCOUNTER — OFFICE VISIT (OUTPATIENT)
Dept: INTERNAL MEDICINE | Facility: CLINIC | Age: 57
End: 2022-09-08
Payer: COMMERCIAL

## 2022-09-08 VITALS
OXYGEN SATURATION: 99 % | HEIGHT: 57 IN | WEIGHT: 152.63 LBS | SYSTOLIC BLOOD PRESSURE: 138 MMHG | TEMPERATURE: 97 F | DIASTOLIC BLOOD PRESSURE: 84 MMHG | HEART RATE: 64 BPM | BODY MASS INDEX: 32.93 KG/M2

## 2022-09-08 DIAGNOSIS — M54.41 ACUTE BILATERAL LOW BACK PAIN WITH RIGHT-SIDED SCIATICA: ICD-10-CM

## 2022-09-08 DIAGNOSIS — M54.50 ACUTE RIGHT-SIDED LOW BACK PAIN WITHOUT SCIATICA: Primary | ICD-10-CM

## 2022-09-08 PROCEDURE — 3075F PR MOST RECENT SYSTOLIC BLOOD PRESS GE 130-139MM HG: ICD-10-PCS | Mod: CPTII,S$GLB,, | Performed by: INTERNAL MEDICINE

## 2022-09-08 PROCEDURE — 1160F PR REVIEW ALL MEDS BY PRESCRIBER/CLIN PHARMACIST DOCUMENTED: ICD-10-PCS | Mod: CPTII,S$GLB,, | Performed by: INTERNAL MEDICINE

## 2022-09-08 PROCEDURE — 99999 PR PBB SHADOW E&M-EST. PATIENT-LVL IV: ICD-10-PCS | Mod: PBBFAC,,, | Performed by: INTERNAL MEDICINE

## 2022-09-08 PROCEDURE — 3008F BODY MASS INDEX DOCD: CPT | Mod: CPTII,S$GLB,, | Performed by: INTERNAL MEDICINE

## 2022-09-08 PROCEDURE — 1160F RVW MEDS BY RX/DR IN RCRD: CPT | Mod: CPTII,S$GLB,, | Performed by: INTERNAL MEDICINE

## 2022-09-08 PROCEDURE — 99999 PR PBB SHADOW E&M-EST. PATIENT-LVL IV: CPT | Mod: PBBFAC,,, | Performed by: INTERNAL MEDICINE

## 2022-09-08 PROCEDURE — 99214 PR OFFICE/OUTPT VISIT, EST, LEVL IV, 30-39 MIN: ICD-10-PCS | Mod: 25,S$GLB,, | Performed by: INTERNAL MEDICINE

## 2022-09-08 PROCEDURE — 96372 PR INJECTION,THERAP/PROPH/DIAG2ST, IM OR SUBCUT: ICD-10-PCS | Mod: S$GLB,,, | Performed by: INTERNAL MEDICINE

## 2022-09-08 PROCEDURE — 1159F MED LIST DOCD IN RCRD: CPT | Mod: CPTII,S$GLB,, | Performed by: INTERNAL MEDICINE

## 2022-09-08 PROCEDURE — 3079F PR MOST RECENT DIASTOLIC BLOOD PRESSURE 80-89 MM HG: ICD-10-PCS | Mod: CPTII,S$GLB,, | Performed by: INTERNAL MEDICINE

## 2022-09-08 PROCEDURE — 96372 THER/PROPH/DIAG INJ SC/IM: CPT | Mod: S$GLB,,, | Performed by: INTERNAL MEDICINE

## 2022-09-08 PROCEDURE — 3079F DIAST BP 80-89 MM HG: CPT | Mod: CPTII,S$GLB,, | Performed by: INTERNAL MEDICINE

## 2022-09-08 PROCEDURE — 3075F SYST BP GE 130 - 139MM HG: CPT | Mod: CPTII,S$GLB,, | Performed by: INTERNAL MEDICINE

## 2022-09-08 PROCEDURE — 1159F PR MEDICATION LIST DOCUMENTED IN MEDICAL RECORD: ICD-10-PCS | Mod: CPTII,S$GLB,, | Performed by: INTERNAL MEDICINE

## 2022-09-08 PROCEDURE — 99214 OFFICE O/P EST MOD 30 MIN: CPT | Mod: 25,S$GLB,, | Performed by: INTERNAL MEDICINE

## 2022-09-08 PROCEDURE — 3008F PR BODY MASS INDEX (BMI) DOCUMENTED: ICD-10-PCS | Mod: CPTII,S$GLB,, | Performed by: INTERNAL MEDICINE

## 2022-09-08 RX ORDER — HYDROCODONE BITARTRATE AND ACETAMINOPHEN 5; 325 MG/1; MG/1
1 TABLET ORAL EVERY 8 HOURS PRN
Qty: 10 TABLET | Refills: 0 | Status: SHIPPED | OUTPATIENT
Start: 2022-09-08 | End: 2023-04-24

## 2022-09-08 RX ORDER — NABUMETONE 750 MG/1
750 TABLET, FILM COATED ORAL 2 TIMES DAILY
Qty: 60 TABLET | Refills: 1 | Status: SHIPPED | OUTPATIENT
Start: 2022-09-08 | End: 2023-04-24

## 2022-09-08 RX ORDER — TIZANIDINE 4 MG/1
4 TABLET ORAL EVERY 6 HOURS PRN
Qty: 60 TABLET | Refills: 0 | Status: SHIPPED | OUTPATIENT
Start: 2022-09-08 | End: 2022-09-18

## 2022-09-08 RX ORDER — TRIAMCINOLONE ACETONIDE 40 MG/ML
40 INJECTION, SUSPENSION INTRA-ARTICULAR; INTRAMUSCULAR
Status: COMPLETED | OUTPATIENT
Start: 2022-09-08 | End: 2022-09-08

## 2022-09-08 RX ADMIN — TRIAMCINOLONE ACETONIDE 40 MG: 40 INJECTION, SUSPENSION INTRA-ARTICULAR; INTRAMUSCULAR at 02:09
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
## 2022-09-15 DIAGNOSIS — M54.50 ACUTE RIGHT-SIDED LOW BACK PAIN WITHOUT SCIATICA: ICD-10-CM

## 2022-09-15 RX ORDER — TIZANIDINE 4 MG/1
4 TABLET ORAL EVERY 6 HOURS PRN
Qty: 60 TABLET | Refills: 0 | OUTPATIENT
Start: 2022-09-15 | End: 2022-09-25

## 2022-09-15 NOTE — TELEPHONE ENCOUNTER
Care Due:                  Date            Visit Type   Department     Provider  --------------------------------------------------------------------------------                                SAME DAY -                              ESTABLISHED   [facility] INTERNAL  Last Visit: 09-      PATIENT      MEDICINE       Ej Franklin  Next Visit: None Scheduled  None         None Found                                                            Last  Test          Frequency    Reason                     Performed    Due Date  --------------------------------------------------------------------------------    CMP.........  12 months..  metFORMIN, rosuvastatin..  12- 12-    HBA1C.......  6 months...  metFORMIN................  12- 06-    Lipid Panel.  12 months..  rosuvastatin.............  12- 12-    [facility] Embedded Care Gaps. Reference number: [id number]. 9/15/2022   12:35:47 PM CDT

## 2022-09-15 NOTE — TELEPHONE ENCOUNTER
tiZANidine (ZANAFLEX) 4 MG tablet 60 tablet 0 9/8/2022 9/18/2022 No   Sig - Route: Take 1 tablet (4 mg total) by mouth every 6 (six) hours as needed (back spasms). - Oral   Sent to pharmacy as: tiZANidine (ZANAFLEX) 4 MG tablet   Class: Normal   Order: [id number]   Date/Time Signed: 9/8/2022 14:37       E-Prescribing Status: Receipt confirmed by pharmacy (9/8/2022  2:37 PM CDT)     Pharmacy    [id number] - [place] - [address]        Left msg above rx should not be low.  Too soon to refill.

## 2022-09-16 ENCOUNTER — PATIENT MESSAGE (OUTPATIENT)
Dept: ADMINISTRATIVE | Facility: HOSPITAL | Age: 57
End: 2022-09-16
Payer: COMMERCIAL

## 2022-10-12 ENCOUNTER — PATIENT MESSAGE (OUTPATIENT)
Dept: ADMINISTRATIVE | Facility: HOSPITAL | Age: 57
End: 2022-10-12
Payer: COMMERCIAL

## 2022-12-16 ENCOUNTER — PATIENT MESSAGE (OUTPATIENT)
Dept: ADMINISTRATIVE | Facility: HOSPITAL | Age: 57
End: 2022-12-16
Payer: COMMERCIAL

## 2023-01-04 ENCOUNTER — PATIENT MESSAGE (OUTPATIENT)
Dept: INTERNAL MEDICINE | Facility: CLINIC | Age: 58
End: 2023-01-04
Payer: COMMERCIAL

## 2023-01-11 ENCOUNTER — TELEPHONE (OUTPATIENT)
Dept: FAMILY MEDICINE | Facility: CLINIC | Age: 58
End: 2023-01-11
Payer: COMMERCIAL

## 2023-02-11 ENCOUNTER — PATIENT MESSAGE (OUTPATIENT)
Dept: ADMINISTRATIVE | Facility: HOSPITAL | Age: 58
End: 2023-02-11
Payer: COMMERCIAL

## 2023-02-12 ENCOUNTER — PATIENT MESSAGE (OUTPATIENT)
Dept: INTERNAL MEDICINE | Facility: CLINIC | Age: 58
End: 2023-02-12
Payer: COMMERCIAL

## 2023-02-12 DIAGNOSIS — Z00.00 WELL ADULT EXAM: Primary | ICD-10-CM

## 2023-03-24 ENCOUNTER — OFFICE VISIT (OUTPATIENT)
Dept: URGENT CARE | Facility: CLINIC | Age: 58
End: 2023-03-24
Payer: COMMERCIAL

## 2023-03-24 VITALS
OXYGEN SATURATION: 97 % | HEART RATE: 68 BPM | WEIGHT: 152 LBS | RESPIRATION RATE: 17 BRPM | TEMPERATURE: 98 F | HEIGHT: 57 IN | BODY MASS INDEX: 32.79 KG/M2 | SYSTOLIC BLOOD PRESSURE: 144 MMHG | DIASTOLIC BLOOD PRESSURE: 79 MMHG

## 2023-03-24 DIAGNOSIS — J02.9 SORE THROAT: Primary | ICD-10-CM

## 2023-03-24 DIAGNOSIS — J06.9 VIRAL URI: ICD-10-CM

## 2023-03-24 LAB
CTP QC/QA: YES
SARS-COV-2 AG RESP QL IA.RAPID: NEGATIVE

## 2023-03-24 PROCEDURE — 87811 SARS-COV-2 COVID19 W/OPTIC: CPT | Mod: QW,S$GLB,, | Performed by: NURSE PRACTITIONER

## 2023-03-24 PROCEDURE — 99213 PR OFFICE/OUTPT VISIT, EST, LEVL III, 20-29 MIN: ICD-10-PCS | Mod: S$GLB,,, | Performed by: NURSE PRACTITIONER

## 2023-03-24 PROCEDURE — 99213 OFFICE O/P EST LOW 20 MIN: CPT | Mod: S$GLB,,, | Performed by: NURSE PRACTITIONER

## 2023-03-24 PROCEDURE — 87811 SARS CORONAVIRUS 2 ANTIGEN POCT, MANUAL READ: ICD-10-PCS | Mod: QW,S$GLB,, | Performed by: NURSE PRACTITIONER

## 2023-03-24 NOTE — PROGRESS NOTES
"Subjective:       Patient ID: Joanne Stallings is a 57 y.o. female.    Vitals:  height is 4' 9" (1.448 m) and weight is 68.9 kg (152 lb). Her temperature is 98.2 °F (36.8 °C). Her blood pressure is 144/79 (abnormal) and her pulse is 68. Her respiration is 17 and oxygen saturation is 97%.     Chief Complaint: Sore Throat      Provider note begins below:    Pt is a 57 year old patient presenting with  sore throat, headache, nasal congestion. Patient stated symptoms started Wednesday. Pt states she was possibly exposed to covid at work.    Sore Throat   This is a new problem. The current episode started in the past 7 days. The problem has been unchanged. There has been no fever. Associated symptoms include congestion and headaches. Pertinent negatives include no coughing, diarrhea, ear pain, shortness of breath, trouble swallowing or vomiting. She has tried nothing for the symptoms.   Constitution: Negative for chills, fatigue and fever.   HENT:  Positive for congestion and sore throat. Negative for ear pain, sinus pain and trouble swallowing.    Cardiovascular:  Negative for chest pain.   Respiratory:  Negative for cough, sputum production and shortness of breath.    Gastrointestinal:  Negative for nausea, vomiting and diarrhea.   Musculoskeletal:  Negative for muscle ache.   Neurological:  Positive for headaches.     Objective:      Physical Exam   Constitutional: She is oriented to person, place, and time.   HENT:   Head: Normocephalic.   Ears:   Right Ear: Hearing and external ear normal. No no drainage, swelling or tenderness. Tympanic membrane is not erythematous, not retracted and not bulging. No middle ear effusion.   Left Ear: Hearing and external ear normal. No no drainage, swelling or tenderness. Tympanic membrane is not erythematous, not retracted and not bulging.  No middle ear effusion.   Nose: Mucosal edema and rhinorrhea present. No purulent discharge. Right sinus exhibits no maxillary sinus " tenderness and no frontal sinus tenderness. Left sinus exhibits no maxillary sinus tenderness and no frontal sinus tenderness.   Mouth/Throat: Uvula is midline and mucous membranes are normal. No uvula swelling. Posterior oropharyngeal erythema present. No oropharyngeal exudate or posterior oropharyngeal edema.   Cardiovascular: Normal rate and regular rhythm.   Pulmonary/Chest: Effort normal and breath sounds normal.   Neurological: She is alert and oriented to person, place, and time.   Skin: Skin is warm and dry.   Nursing note and vitals reviewed.      Assessment:       1. Sore throat    2. Viral URI          Plan:         Sore throat  -     SARS Coronavirus 2 Antigen, POCT Manual Read    Viral URI

## 2023-03-25 NOTE — PATIENT INSTRUCTIONS
Viral URI (upper respiratory infection):    Your symptoms are viral in nature.  Viral upper respiratory infections typically run their course in 7-14 days.     - Rest at home.     - Drink plenty of fluids so you won't get dehydrated.    - you can take plain Mucinex (guaifenesin) 1200 mg twice a day to help loosen mucous.     - Fever/Pain recommendations:  Alternate Tylenol or Ibuprofen as directed for fever/pain. Avoid tylenol if you have a history of liver disease. Do not take ibuprofen if you have a history of GI bleeding, kidney disease, or if you take blood thinners.  Take ibuprofen 600-800 mg every 6-8 hours for pain and inflammation.  You can also take Tylenol/acetaminophen 650-1000 mg every 6-8 hours for added pain relief.     - Cough recommendations:  Dextromethorphan (DM) is a cough suppressant over the counter (ie. mucinex DM, robitussin, delsym; dayquil/nyquil has DM as well.).  Warm tea with honey can help with cough. Honey is a natural cough suppressant.    -Sore throat recommendations: Warm fluids, warm salt water gargles, throat lozenges, tea, honey, soup, or drinking something cold or frozen.  Throat lozenges or sprays help reduce pain. Gargling with warm saltwater (1/4 teaspoon of salt in 1/2 cup of warm water) or an OTC anesthetic gargle may be useful for irritation.    - Follow up with your PCP or specialty clinic as directed in the next 1-2 weeks if not improved or as needed.  You can call [phone] to schedule an appointment with the appropriate provider.    - Go to the ER if you develop new or worsening symptoms.      - You must understand that you have received an Urgent Care treatment only and that you may be released before all of your medical problems are known or treated.   - You, the patient, will arrange for follow up care as instructed.   - If your condition worsens or fails to improve we recommend that you receive another evaluation at the ER immediately or contact your PCP to  discuss your concerns or return here.    When to seek medical advice  Call your healthcare provider right away if any of these occur:  Fever that is poorly controlled with OTC fever reducing medication  New or worsening ear pain, sinus pain, or headache  Stiff neck  You can't swallow liquids or you can't open your mouth wide because of throat pain  Signs of dehydration. These include very dark urine or no urine, sunken eyes, and dizziness.  Trouble breathing or noisy breathing  Muffled voice  Rash    '

## 2023-04-10 ENCOUNTER — PATIENT OUTREACH (OUTPATIENT)
Dept: ADMINISTRATIVE | Facility: HOSPITAL | Age: 58
End: 2023-04-10
Payer: COMMERCIAL

## 2023-04-10 DIAGNOSIS — Z12.31 ENCOUNTER FOR SCREENING MAMMOGRAM FOR MALIGNANT NEOPLASM OF BREAST: Primary | ICD-10-CM

## 2023-04-10 NOTE — PROGRESS NOTES
Health Maintenance Due   Topic Date Due    Pneumococcal Vaccines (Age 0-64) (1 - PCV) Never done    HIV Screening  Never done    Low Dose Statin  Never done    Shingles Vaccine (1 of 2) Never done    TETANUS VACCINE  12/10/2018    COVID-19 Vaccine (3 - Booster for Pfizer series) 05/14/2021    Mammogram  12/26/2021    Influenza Vaccine (1) 09/01/2022    Foot Exam  12/28/2022     Chart reviewed.   Immunizations: Reconciled  Orders placed: Mammogram  Upcoming appts to satisfy [facility] topics: Labs 4/15/2023  Outreached to patient labs scheduled for upcoming annual exam.

## 2023-04-15 ENCOUNTER — LAB VISIT (OUTPATIENT)
Dept: LAB | Facility: HOSPITAL | Age: 58
End: 2023-04-15
Attending: INTERNAL MEDICINE
Payer: COMMERCIAL

## 2023-04-15 DIAGNOSIS — Z00.00 WELL ADULT EXAM: ICD-10-CM

## 2023-04-15 LAB
ALBUMIN SERPL BCP-MCNC: 4 G/DL (ref 3.5–5.2)
ALP SERPL-CCNC: 77 U/L (ref 55–135)
ALT SERPL W/O P-5'-P-CCNC: 14 U/L (ref 10–44)
ANION GAP SERPL CALC-SCNC: 12 MMOL/L (ref 8–16)
AST SERPL-CCNC: 20 U/L (ref 10–40)
BASOPHILS # BLD AUTO: 0.03 K/UL (ref 0–0.2)
BASOPHILS NFR BLD: 0.7 % (ref 0–1.9)
BILIRUB SERPL-MCNC: 0.5 MG/DL (ref 0.1–1)
BUN SERPL-MCNC: 11 MG/DL (ref 6–20)
CALCIUM SERPL-MCNC: 10.3 MG/DL (ref 8.7–10.5)
CHLORIDE SERPL-SCNC: 105 MMOL/L (ref 95–110)
CHOLEST SERPL-MCNC: 300 MG/DL (ref 120–199)
CHOLEST/HDLC SERPL: 5.8 {RATIO} (ref 2–5)
CO2 SERPL-SCNC: 25 MMOL/L (ref 23–29)
CREAT SERPL-MCNC: 0.9 MG/DL (ref 0.5–1.4)
DIFFERENTIAL METHOD: ABNORMAL
EOSINOPHIL # BLD AUTO: 0.1 K/UL (ref 0–0.5)
EOSINOPHIL NFR BLD: 2.2 % (ref 0–8)
ERYTHROCYTE [DISTWIDTH] IN BLOOD BY AUTOMATED COUNT: 13.6 % (ref 11.5–14.5)
EST. GFR  (NO RACE VARIABLE): >60 ML/MIN/1.73 M^2
ESTIMATED AVG GLUCOSE: 151 MG/DL (ref 68–131)
GLUCOSE SERPL-MCNC: 143 MG/DL (ref 70–110)
HBA1C MFR BLD: 6.9 % (ref 4–5.6)
HCT VFR BLD AUTO: 43.9 % (ref 37–48.5)
HDLC SERPL-MCNC: 52 MG/DL (ref 40–75)
HDLC SERPL: 17.3 % (ref 20–50)
HGB BLD-MCNC: 13.2 G/DL (ref 12–16)
IMM GRANULOCYTES # BLD AUTO: 0.01 K/UL (ref 0–0.04)
IMM GRANULOCYTES NFR BLD AUTO: 0.2 % (ref 0–0.5)
LDLC SERPL CALC-MCNC: 227.4 MG/DL (ref 63–159)
LYMPHOCYTES # BLD AUTO: 1.4 K/UL (ref 1–4.8)
LYMPHOCYTES NFR BLD: 31.6 % (ref 18–48)
MCH RBC QN AUTO: 28.1 PG (ref 27–31)
MCHC RBC AUTO-ENTMCNC: 30.1 G/DL (ref 32–36)
MCV RBC AUTO: 93 FL (ref 82–98)
MONOCYTES # BLD AUTO: 0.3 K/UL (ref 0.3–1)
MONOCYTES NFR BLD: 7.1 % (ref 4–15)
NEUTROPHILS # BLD AUTO: 2.6 K/UL (ref 1.8–7.7)
NEUTROPHILS NFR BLD: 58.2 % (ref 38–73)
NONHDLC SERPL-MCNC: 248 MG/DL
NRBC BLD-RTO: 0 /100 WBC
PLATELET # BLD AUTO: 341 K/UL (ref 150–450)
PMV BLD AUTO: 10.9 FL (ref 9.2–12.9)
POTASSIUM SERPL-SCNC: 4.1 MMOL/L (ref 3.5–5.1)
PROT SERPL-MCNC: 8.1 G/DL (ref 6–8.4)
RBC # BLD AUTO: 4.7 M/UL (ref 4–5.4)
SODIUM SERPL-SCNC: 142 MMOL/L (ref 136–145)
T4 FREE SERPL-MCNC: 0.89 NG/DL (ref 0.71–1.51)
TRIGL SERPL-MCNC: 103 MG/DL (ref 30–150)
TSH SERPL DL<=0.005 MIU/L-ACNC: 1.53 UIU/ML (ref 0.4–4)
WBC # BLD AUTO: 4.5 K/UL (ref 3.9–12.7)

## 2023-04-15 PROCEDURE — 36415 COLL VENOUS BLD VENIPUNCTURE: CPT | Mod: PO | Performed by: INTERNAL MEDICINE

## 2023-04-15 PROCEDURE — 85025 COMPLETE CBC W/AUTO DIFF WBC: CPT | Performed by: INTERNAL MEDICINE

## 2023-04-15 PROCEDURE — 80061 LIPID PANEL: CPT | Performed by: INTERNAL MEDICINE

## 2023-04-15 PROCEDURE — 80053 COMPREHEN METABOLIC PANEL: CPT | Performed by: INTERNAL MEDICINE

## 2023-04-15 PROCEDURE — 83036 HEMOGLOBIN GLYCOSYLATED A1C: CPT | Performed by: INTERNAL MEDICINE

## 2023-04-15 PROCEDURE — 84439 ASSAY OF FREE THYROXINE: CPT | Performed by: INTERNAL MEDICINE

## 2023-04-15 PROCEDURE — 84443 ASSAY THYROID STIM HORMONE: CPT | Performed by: INTERNAL MEDICINE

## 2023-04-24 ENCOUNTER — OFFICE VISIT (OUTPATIENT)
Dept: INTERNAL MEDICINE | Facility: CLINIC | Age: 58
End: 2023-04-24
Payer: COMMERCIAL

## 2023-04-24 VITALS
DIASTOLIC BLOOD PRESSURE: 74 MMHG | SYSTOLIC BLOOD PRESSURE: 130 MMHG | OXYGEN SATURATION: 98 % | TEMPERATURE: 97 F | HEART RATE: 60 BPM | BODY MASS INDEX: 33.16 KG/M2 | WEIGHT: 153.69 LBS | RESPIRATION RATE: 16 BRPM | HEIGHT: 57 IN

## 2023-04-24 DIAGNOSIS — Z12.31 ENCOUNTER FOR SCREENING MAMMOGRAM FOR BREAST CANCER: ICD-10-CM

## 2023-04-24 DIAGNOSIS — Z00.00 WELL ADULT EXAM: Primary | ICD-10-CM

## 2023-04-24 DIAGNOSIS — T38.3X5A ADVERSE EFFECT OF METFORMIN, INITIAL ENCOUNTER: ICD-10-CM

## 2023-04-24 DIAGNOSIS — E11.9 TYPE 2 DIABETES MELLITUS WITHOUT COMPLICATION, WITHOUT LONG-TERM CURRENT USE OF INSULIN: ICD-10-CM

## 2023-04-24 DIAGNOSIS — E78.49 OTHER HYPERLIPIDEMIA: ICD-10-CM

## 2023-04-24 PROCEDURE — 3061F PR NEG MICROALBUMINURIA RESULT DOCUMENTED/REVIEW: ICD-10-PCS | Mod: CPTII,S$GLB,, | Performed by: INTERNAL MEDICINE

## 2023-04-24 PROCEDURE — 3044F HG A1C LEVEL LT 7.0%: CPT | Mod: CPTII,S$GLB,, | Performed by: INTERNAL MEDICINE

## 2023-04-24 PROCEDURE — 1159F MED LIST DOCD IN RCRD: CPT | Mod: CPTII,S$GLB,, | Performed by: INTERNAL MEDICINE

## 2023-04-24 PROCEDURE — 3072F PR LOW RISK FOR RETINOPATHY: ICD-10-PCS | Mod: CPTII,S$GLB,, | Performed by: INTERNAL MEDICINE

## 2023-04-24 PROCEDURE — 3066F NEPHROPATHY DOC TX: CPT | Mod: CPTII,S$GLB,, | Performed by: INTERNAL MEDICINE

## 2023-04-24 PROCEDURE — 1159F PR MEDICATION LIST DOCUMENTED IN MEDICAL RECORD: ICD-10-PCS | Mod: CPTII,S$GLB,, | Performed by: INTERNAL MEDICINE

## 2023-04-24 PROCEDURE — 3061F NEG MICROALBUMINURIA REV: CPT | Mod: CPTII,S$GLB,, | Performed by: INTERNAL MEDICINE

## 2023-04-24 PROCEDURE — 3075F SYST BP GE 130 - 139MM HG: CPT | Mod: CPTII,S$GLB,, | Performed by: INTERNAL MEDICINE

## 2023-04-24 PROCEDURE — 99396 PR PREVENTIVE VISIT,EST,40-64: ICD-10-PCS | Mod: S$GLB,,, | Performed by: INTERNAL MEDICINE

## 2023-04-24 PROCEDURE — 1160F PR REVIEW ALL MEDS BY PRESCRIBER/CLIN PHARMACIST DOCUMENTED: ICD-10-PCS | Mod: CPTII,S$GLB,, | Performed by: INTERNAL MEDICINE

## 2023-04-24 PROCEDURE — 99999 PR PBB SHADOW E&M-EST. PATIENT-LVL V: ICD-10-PCS | Mod: PBBFAC,,, | Performed by: INTERNAL MEDICINE

## 2023-04-24 PROCEDURE — 3072F LOW RISK FOR RETINOPATHY: CPT | Mod: CPTII,S$GLB,, | Performed by: INTERNAL MEDICINE

## 2023-04-24 PROCEDURE — 3008F PR BODY MASS INDEX (BMI) DOCUMENTED: ICD-10-PCS | Mod: CPTII,S$GLB,, | Performed by: INTERNAL MEDICINE

## 2023-04-24 PROCEDURE — 3066F PR DOCUMENTATION OF TREATMENT FOR NEPHROPATHY: ICD-10-PCS | Mod: CPTII,S$GLB,, | Performed by: INTERNAL MEDICINE

## 2023-04-24 PROCEDURE — 3078F PR MOST RECENT DIASTOLIC BLOOD PRESSURE < 80 MM HG: ICD-10-PCS | Mod: CPTII,S$GLB,, | Performed by: INTERNAL MEDICINE

## 2023-04-24 PROCEDURE — 3008F BODY MASS INDEX DOCD: CPT | Mod: CPTII,S$GLB,, | Performed by: INTERNAL MEDICINE

## 2023-04-24 PROCEDURE — 1160F RVW MEDS BY RX/DR IN RCRD: CPT | Mod: CPTII,S$GLB,, | Performed by: INTERNAL MEDICINE

## 2023-04-24 PROCEDURE — 99999 PR PBB SHADOW E&M-EST. PATIENT-LVL V: CPT | Mod: PBBFAC,,, | Performed by: INTERNAL MEDICINE

## 2023-04-24 PROCEDURE — 3044F PR MOST RECENT HEMOGLOBIN A1C LEVEL <7.0%: ICD-10-PCS | Mod: CPTII,S$GLB,, | Performed by: INTERNAL MEDICINE

## 2023-04-24 PROCEDURE — 3078F DIAST BP <80 MM HG: CPT | Mod: CPTII,S$GLB,, | Performed by: INTERNAL MEDICINE

## 2023-04-24 PROCEDURE — 99396 PREV VISIT EST AGE 40-64: CPT | Mod: S$GLB,,, | Performed by: INTERNAL MEDICINE

## 2023-04-24 PROCEDURE — 3075F PR MOST RECENT SYSTOLIC BLOOD PRESS GE 130-139MM HG: ICD-10-PCS | Mod: CPTII,S$GLB,, | Performed by: INTERNAL MEDICINE

## 2023-04-24 RX ORDER — ROSUVASTATIN CALCIUM 20 MG/1
20 TABLET, COATED ORAL NIGHTLY
Qty: 90 TABLET | Refills: 3 | Status: SHIPPED | OUTPATIENT
Start: 2023-04-24 | End: 2024-04-23

## 2023-04-24 RX ORDER — SEMAGLUTIDE 0.68 MG/ML
0.25 INJECTION, SOLUTION SUBCUTANEOUS
Qty: 3 ML | Refills: 3 | Status: SHIPPED | OUTPATIENT
Start: 2023-04-24 | End: 2023-10-02 | Stop reason: DRUGHIGH

## 2023-04-24 RX ORDER — LANCETS
1 EACH MISCELLANEOUS DAILY
Qty: 50 EACH | Refills: 11 | Status: SHIPPED | OUTPATIENT
Start: 2023-04-24

## 2023-04-24 RX ORDER — INSULIN PUMP SYRINGE, 3 ML
EACH MISCELLANEOUS
Qty: 1 EACH | Refills: 3 | Status: SHIPPED | OUTPATIENT
Start: 2023-04-24 | End: 2024-04-23

## 2023-04-25 ENCOUNTER — PATIENT MESSAGE (OUTPATIENT)
Dept: DIABETES | Facility: CLINIC | Age: 58
End: 2023-04-25
Payer: COMMERCIAL

## 2023-04-25 ENCOUNTER — PATIENT MESSAGE (OUTPATIENT)
Dept: RESEARCH | Facility: HOSPITAL | Age: 58
End: 2023-04-25
Payer: COMMERCIAL

## 2023-05-09 ENCOUNTER — PATIENT MESSAGE (OUTPATIENT)
Dept: RESEARCH | Facility: HOSPITAL | Age: 58
End: 2023-05-09
Payer: COMMERCIAL

## 2023-05-29 NOTE — PROGRESS NOTES
Subjective:       Patient ID: Joanne Stallings is a 57 y.o. female.    Chief Complaint: Annual Exam    HPI  The patient presents for annual physical examination and follow-up of type 2 diabetes mellitus and hyperlipidemia.  The patient states she is not been taking her medications - metformin and Crestor.  She states that she felt bad on metformin with symptoms of nausea and diarrhea.  She would like to try Ozempic or Mounjaro.  She also has not been taking Crestor as prescribed.  She denies having any side effects from the medication however.  She does not have hypertension.    Immunization record was reviewed.  The patient has declined taking any immunizations at this time.    Screening tests were reviewed.    No interval change in past medical history, family history, or social history since prior evaluations.    Review of Systems   Constitutional:  Negative for fatigue, fever and unexpected weight change.   HENT:  Negative for nasal congestion, postnasal drip, rhinorrhea and sore throat.    Eyes:  Negative for visual disturbance.   Respiratory:  Negative for cough, chest tightness, shortness of breath and wheezing.    Cardiovascular:  Negative for chest pain, palpitations and leg swelling.   Gastrointestinal:  Negative for abdominal pain and blood in stool.   Genitourinary:  Negative for dysuria, frequency and hematuria.   Musculoskeletal:  Negative for arthralgias, back pain, joint swelling and myalgias.   Integumentary:  Negative for rash.   Neurological:  Negative for dizziness, syncope, weakness, numbness and headaches.   Psychiatric/Behavioral:  Negative for sleep disturbance. The patient is not nervous/anxious.           Physical Exam  Vitals and nursing note reviewed.   Constitutional:       General: She is not in acute distress.     Appearance: Normal appearance. She is well-developed.   HENT:      Head: Normocephalic and atraumatic.      Right Ear: External ear normal.      Left Ear: External ear  normal.      Nose: Nose normal.      Mouth/Throat:      Mouth: Mucous membranes are moist.      Pharynx: Oropharynx is clear. No oropharyngeal exudate.   Eyes:      General: No scleral icterus.     Extraocular Movements: Extraocular movements intact.      Conjunctiva/sclera: Conjunctivae normal.   Neck:      Thyroid: No thyromegaly.      Vascular: No carotid bruit or JVD.   Cardiovascular:      Rate and Rhythm: Normal rate and regular rhythm.      Pulses: Normal pulses.      Heart sounds: Normal heart sounds. No murmur heard.    No friction rub. No gallop.   Pulmonary:      Effort: Pulmonary effort is normal. No respiratory distress.      Breath sounds: Normal breath sounds. No wheezing or rales.   Abdominal:      General: Bowel sounds are normal. There is no abdominal bruit.      Palpations: Abdomen is soft. There is no hepatomegaly, splenomegaly or mass.      Tenderness: There is no abdominal tenderness.      Hernia: No hernia is present.   Musculoskeletal:         General: No tenderness. Normal range of motion.      Right shoulder: No deformity or effusion.      Cervical back: Normal range of motion and neck supple.      Right lower leg: No edema.      Left lower leg: No edema.   Lymphadenopathy:      Cervical: No cervical adenopathy.      Upper Body:      Right upper body: No supraclavicular adenopathy.      Left upper body: No supraclavicular adenopathy.   Skin:     General: Skin is warm and dry.      Findings: No rash.   Neurological:      Mental Status: She is alert and oriented to person, place, and time.      Cranial Nerves: No cranial nerve deficit.   Psychiatric:         Mood and Affect: Mood normal.         Speech: Speech normal.         Behavior: Behavior normal.         Protective Sensation (w/ 10 gram monofilament):  Right: Intact  Left: Intact    Visual Inspection:  Normal -  Bilateral    Pedal Pulses:   Right: Present  Left: Present    Posterior Tibialis Pulses:   Right:Present  Left: Present    Lab  Visit on 04/15/2023   Component Date Value Ref Range Status    Specimen UA 04/15/2023 Urine, Clean Catch   Final    Color, UA 04/15/2023 Yellow  Yellow, Straw, Asha Final    Appearance, UA 04/15/2023 Hazy (A)  Clear Final    pH, UA 04/15/2023 7.0  5.0 - 8.0 Final    Specific Gravity, UA 04/15/2023 1.020  1.005 - 1.030 Final    Protein, UA 04/15/2023 Negative  Negative Final    Comment: Recommend a 24 hour urine protein or a urine   protein/creatinine ratio if globulin induced proteinuria is  clinically suspected.      Glucose, UA 04/15/2023 Negative  Negative Final    Ketones, UA 04/15/2023 Negative  Negative Final    Bilirubin (UA) 04/15/2023 Negative  Negative Final    Occult Blood UA 04/15/2023 Negative  Negative Final    Nitrite, UA 04/15/2023 Negative  Negative Final    Leukocytes, UA 04/15/2023 Negative  Negative Final    Microalbumin, Urine 04/15/2023 7.0  ug/mL Final    Creatinine, Urine 04/15/2023 143.0  15.0 - 325.0 mg/dL Final    Microalb/Creat Ratio 04/15/2023 4.9  0.0 - 30.0 ug/mg Final   Lab Visit on 04/15/2023   Component Date Value Ref Range Status    Sodium 04/15/2023 142  136 - 145 mmol/L Final    Potassium 04/15/2023 4.1  3.5 - 5.1 mmol/L Final    Chloride 04/15/2023 105  95 - 110 mmol/L Final    CO2 04/15/2023 25  23 - 29 mmol/L Final    Glucose 04/15/2023 143 (H)  70 - 110 mg/dL Final    BUN 04/15/2023 11  6 - 20 mg/dL Final    Creatinine 04/15/2023 0.9  0.5 - 1.4 mg/dL Final    Calcium 04/15/2023 10.3  8.7 - 10.5 mg/dL Final    Total Protein 04/15/2023 8.1  6.0 - 8.4 g/dL Final    Albumin 04/15/2023 4.0  3.5 - 5.2 g/dL Final    Total Bilirubin 04/15/2023 0.5  0.1 - 1.0 mg/dL Final    Comment: For infants and newborns, interpretation of results should be based  on gestational age, weight and in agreement with clinical  observations.    Premature Infant recommended reference ranges:  Up to 24 hours.............<8.0 mg/dL  Up to 48 hours............<12.0 mg/dL  3-5 days..................<15.0  mg/dL  6-29 days.................<15.0 mg/dL      Alkaline Phosphatase 04/15/2023 77  55 - 135 U/L Final    AST 04/15/2023 20  10 - 40 U/L Final    ALT 04/15/2023 14  10 - 44 U/L Final    Anion Gap 04/15/2023 12  8 - 16 mmol/L Final    eGFR 04/15/2023 >60.0  >60 mL/min/1.73 m^2 Final    Cholesterol 04/15/2023 300 (H)  120 - 199 mg/dL Final    Comment: The National Cholesterol Education Program (NCEP) has set the  following guidelines (reference ranges) for Cholesterol:  Optimal.....................<200 mg/dL  Borderline High.............200-239 mg/dL  High........................> or = 240 mg/dL      Triglycerides 04/15/2023 103  30 - 150 mg/dL Final    Comment: The National Cholesterol Education Program (NCEP) has set the  following guidelines (reference values) for triglycerides:  Normal......................<150 mg/dL  Borderline High.............150-199 mg/dL  High........................200-499 mg/dL      HDL 04/15/2023 52  40 - 75 mg/dL Final    Comment: The National Cholesterol Education Program (NCEP) has set the  following guidelines (reference values) for HDL Cholesterol:  Low...............<40 mg/dL  Optimal...........>60 mg/dL      LDL Cholesterol 04/15/2023 227.4 (H)  63.0 - 159.0 mg/dL Final    Comment: The National Cholesterol Education Program (NCEP) has set the  following guidelines (reference values) for LDL Cholesterol:  Optimal.......................<130 mg/dL  Borderline High...............130-159 mg/dL  High..........................160-189 mg/dL  Very High.....................>190 mg/dL      HDL/Cholesterol Ratio 04/15/2023 17.3 (L)  20.0 - 50.0 % Final    Total Cholesterol/HDL Ratio 04/15/2023 5.8 (H)  2.0 - 5.0 Final    Non-HDL Cholesterol 04/15/2023 248  mg/dL Final    Comment: Risk category and Non-HDL cholesterol goals:  Coronary heart disease (CHD)or equivalent (10-year risk of CHD >20%):  Non-HDL cholesterol goal     <130 mg/dL  Two or more CHD risk factors and 10-year risk of CHD <=  20%:  Non-HDL cholesterol goal     <160 mg/dL  0 to 1 CHD risk factor:  Non-HDL cholesterol goal     <190 mg/dL      WBC 04/15/2023 4.50  3.90 - 12.70 K/uL Final    RBC 04/15/2023 4.70  4.00 - 5.40 M/uL Final    Hemoglobin 04/15/2023 13.2  12.0 - 16.0 g/dL Final    Hematocrit 04/15/2023 43.9  37.0 - 48.5 % Final    MCV 04/15/2023 93  82 - 98 fL Final    MCH 04/15/2023 28.1  27.0 - 31.0 pg Final    MCHC 04/15/2023 30.1 (L)  32.0 - 36.0 g/dL Final    RDW 04/15/2023 13.6  11.5 - 14.5 % Final    Platelets 04/15/2023 341  150 - 450 K/uL Final    MPV 04/15/2023 10.9  9.2 - 12.9 fL Final    Immature Granulocytes 04/15/2023 0.2  0.0 - 0.5 % Final    Gran # (ANC) 04/15/2023 2.6  1.8 - 7.7 K/uL Final    Immature Grans (Abs) 04/15/2023 0.01  0.00 - 0.04 K/uL Final    Comment: Mild elevation in immature granulocytes is non specific and   can be seen in a variety of conditions including stress response,   acute inflammation, trauma and pregnancy. Correlation with other   laboratory and clinical findings is essential.      Lymph # 04/15/2023 1.4  1.0 - 4.8 K/uL Final    Mono # 04/15/2023 0.3  0.3 - 1.0 K/uL Final    Eos # 04/15/2023 0.1  0.0 - 0.5 K/uL Final    Baso # 04/15/2023 0.03  0.00 - 0.20 K/uL Final    nRBC 04/15/2023 0  0 /100 WBC Final    Gran % 04/15/2023 58.2  38.0 - 73.0 % Final    Lymph % 04/15/2023 31.6  18.0 - 48.0 % Final    Mono % 04/15/2023 7.1  4.0 - 15.0 % Final    Eosinophil % 04/15/2023 2.2  0.0 - 8.0 % Final    Basophil % 04/15/2023 0.7  0.0 - 1.9 % Final    Differential Method 04/15/2023 Automated   Final    TSH 04/15/2023 1.535  0.400 - 4.000 uIU/mL Final    Free T4 04/15/2023 0.89  0.71 - 1.51 ng/dL Final    Hemoglobin A1C 04/15/2023 6.9 (H)  4.0 - 5.6 % Final    Comment: ADA Screening Guidelines:  5.7-6.4%  Consistent with prediabetes  >or=6.5%  Consistent with diabetes    High levels of fetal hemoglobin interfere with the HbA1C  assay. Heterozygous hemoglobin variants (HbS, HgC, etc)do  not  significantly interfere with this assay.   However, presence of multiple variants may affect accuracy.      Estimated Avg Glucose 04/15/2023 151 (H)  68 - 131 mg/dL Final   Office Visit on 03/24/2023   Component Date Value Ref Range Status    SARS Coronavirus 2 Antigen 03/24/2023 Negative  Negative Final     Acceptable 03/24/2023 Yes   Final       Assessment & Plan:      Joanne was seen today for annual exam.  Metformin been prescription will be discontinued due to side effects.  Ozempic will be ordered.    The patient was advised to resume Crestor therapy for management of hyperlipidemia and for cardiovascular risk reduction in the setting of diabetes.    Consultation for diabetes education will be sent.  Prescription for diabetes supplies will also be sent.  Fasting blood tests will be repeated in 3 months.    Diagnoses and all orders for this visit:    Well adult exam    Type 2 diabetes mellitus without complication, without long-term current use of insulin  -     blood-glucose meter kit; To check BG1 times daily, to use with insurance preferred meter  -     blood sugar diagnostic Strp; To check BG once daily, to use with insurance preferred meter  -     lancets Misc; 1 each by Misc.(Non-Drug; Combo Route) route once daily.  -     rosuvastatin (CRESTOR) 20 MG tablet; Take 1 tablet (20 mg total) by mouth every evening.  -     Ambulatory referral/consult to Diabetes Education; Future  -     Comprehensive Metabolic Panel; Future  -     Lipid Panel; Future  -     Hemoglobin A1C; Future    Other hyperlipidemia  -     Comprehensive Metabolic Panel; Future  -     Lipid Panel; Future  -     Hemoglobin A1C; Future    Adverse effect of metformin, initial encounter    Encounter for screening mammogram for breast cancer  -     Mammo Digital Screening Bilat w/ Dusty; Future    Other orders  -     semaglutide (OZEMPIC) 0.25 mg or 0.5 mg (2 mg/3 mL) pen injector; Inject 0.25 mg into the skin every 7 days. After 4  weeks increase the dose to 0.5 mg SQ every 7 days.         Follow up in about 4 months (around 8/24/2023).     Alonso Phillips MD

## 2023-06-26 ENCOUNTER — TELEPHONE (OUTPATIENT)
Dept: OBSTETRICS AND GYNECOLOGY | Facility: CLINIC | Age: 58
End: 2023-06-26
Payer: COMMERCIAL

## 2023-06-30 ENCOUNTER — HOSPITAL ENCOUNTER (OUTPATIENT)
Dept: RADIOLOGY | Facility: HOSPITAL | Age: 58
Discharge: HOME | End: 2023-06-30
Attending: INTERNAL MEDICINE
Payer: COMMERCIAL

## 2023-06-30 DIAGNOSIS — Z12.31 ENCOUNTER FOR SCREENING MAMMOGRAM FOR MALIGNANT NEOPLASM OF BREAST: ICD-10-CM

## 2023-06-30 PROCEDURE — 77067 MAMMO DIGITAL SCREENING BILAT WITH TOMO: ICD-10-PCS | Mod: 26,,, | Performed by: RADIOLOGY

## 2023-06-30 PROCEDURE — 77067 SCR MAMMO BI INCL CAD: CPT | Mod: 26,,, | Performed by: RADIOLOGY

## 2023-06-30 PROCEDURE — 77067 SCR MAMMO BI INCL CAD: CPT | Mod: TC

## 2023-06-30 PROCEDURE — 77063 MAMMO DIGITAL SCREENING BILAT WITH TOMO: ICD-10-PCS | Mod: 26,,, | Performed by: RADIOLOGY

## 2023-06-30 PROCEDURE — 77063 BREAST TOMOSYNTHESIS BI: CPT | Mod: 26,,, | Performed by: RADIOLOGY

## 2023-07-10 ENCOUNTER — PATIENT MESSAGE (OUTPATIENT)
Dept: INTERNAL MEDICINE | Facility: CLINIC | Age: 58
End: 2023-07-10
Payer: COMMERCIAL

## 2023-07-10 DIAGNOSIS — R92.8 ABNORMAL MAMMOGRAPHY: Primary | ICD-10-CM

## 2023-07-10 NOTE — TELEPHONE ENCOUNTER
She says sukhjinder is telling her dr villar hasn't sent them orders.    Usually sukhjinder enters orders needed and dr villar just has to co sign them.    I put in orders recommended by mammogram.  I told patient to check back with sukhjinder.  Told her they usually enter and schedule the orders.

## 2023-07-10 NOTE — TELEPHONE ENCOUNTER
----- Message from Jessenia Taylor sent at 7/10/2023  3:05 PM CDT -----  Regarding: Orders for mammogram  Contact: [phone]  Pt calling in regards to getting orders for a diagnostic mammogram. Please call pt when orders are in

## 2023-07-29 ENCOUNTER — LAB VISIT (OUTPATIENT)
Dept: LAB | Facility: HOSPITAL | Age: 58
End: 2023-07-29
Attending: INTERNAL MEDICINE
Payer: COMMERCIAL

## 2023-07-29 DIAGNOSIS — E78.49 OTHER HYPERLIPIDEMIA: ICD-10-CM

## 2023-07-29 DIAGNOSIS — E11.9 TYPE 2 DIABETES MELLITUS WITHOUT COMPLICATION, WITHOUT LONG-TERM CURRENT USE OF INSULIN: ICD-10-CM

## 2023-07-29 LAB
ALBUMIN SERPL BCP-MCNC: 3.8 G/DL (ref 3.5–5.2)
ALP SERPL-CCNC: 64 U/L (ref 55–135)
ALT SERPL W/O P-5'-P-CCNC: 11 U/L (ref 10–44)
ANION GAP SERPL CALC-SCNC: 9 MMOL/L (ref 8–16)
AST SERPL-CCNC: 19 U/L (ref 10–40)
BILIRUB SERPL-MCNC: 0.4 MG/DL (ref 0.1–1)
BUN SERPL-MCNC: 12 MG/DL (ref 6–20)
CALCIUM SERPL-MCNC: 9.9 MG/DL (ref 8.7–10.5)
CHLORIDE SERPL-SCNC: 108 MMOL/L (ref 95–110)
CHOLEST SERPL-MCNC: 284 MG/DL (ref 120–199)
CHOLEST/HDLC SERPL: 5.9 {RATIO} (ref 2–5)
CO2 SERPL-SCNC: 25 MMOL/L (ref 23–29)
CREAT SERPL-MCNC: 0.8 MG/DL (ref 0.5–1.4)
EST. GFR  (NO RACE VARIABLE): >60 ML/MIN/1.73 M^2
ESTIMATED AVG GLUCOSE: 128 MG/DL (ref 68–131)
GLUCOSE SERPL-MCNC: 94 MG/DL (ref 70–110)
HBA1C MFR BLD: 6.1 % (ref 4–5.6)
HDLC SERPL-MCNC: 48 MG/DL (ref 40–75)
HDLC SERPL: 16.9 % (ref 20–50)
LDLC SERPL CALC-MCNC: 215.6 MG/DL (ref 63–159)
NONHDLC SERPL-MCNC: 236 MG/DL
POTASSIUM SERPL-SCNC: 4.6 MMOL/L (ref 3.5–5.1)
PROT SERPL-MCNC: 7.5 G/DL (ref 6–8.4)
SODIUM SERPL-SCNC: 142 MMOL/L (ref 136–145)
TRIGL SERPL-MCNC: 102 MG/DL (ref 30–150)

## 2023-07-29 PROCEDURE — 80061 LIPID PANEL: CPT | Performed by: INTERNAL MEDICINE

## 2023-07-29 PROCEDURE — 36415 COLL VENOUS BLD VENIPUNCTURE: CPT | Mod: PO | Performed by: INTERNAL MEDICINE

## 2023-07-29 PROCEDURE — 80053 COMPREHEN METABOLIC PANEL: CPT | Performed by: INTERNAL MEDICINE

## 2023-07-29 PROCEDURE — 83036 HEMOGLOBIN GLYCOSYLATED A1C: CPT | Performed by: INTERNAL MEDICINE

## 2023-07-31 ENCOUNTER — OFFICE VISIT (OUTPATIENT)
Dept: OBSTETRICS AND GYNECOLOGY | Facility: CLINIC | Age: 58
End: 2023-07-31
Payer: COMMERCIAL

## 2023-07-31 VITALS
DIASTOLIC BLOOD PRESSURE: 78 MMHG | BODY MASS INDEX: 29.65 KG/M2 | WEIGHT: 147.06 LBS | HEIGHT: 59 IN | SYSTOLIC BLOOD PRESSURE: 117 MMHG

## 2023-07-31 DIAGNOSIS — Z01.419 ENCOUNTER FOR ANNUAL ROUTINE GYNECOLOGICAL EXAMINATION: Primary | ICD-10-CM

## 2023-07-31 DIAGNOSIS — Z12.4 PAP SMEAR FOR CERVICAL CANCER SCREENING: ICD-10-CM

## 2023-07-31 DIAGNOSIS — N95.2 VAGINAL ATROPHY: ICD-10-CM

## 2023-07-31 PROCEDURE — 3078F PR MOST RECENT DIASTOLIC BLOOD PRESSURE < 80 MM HG: ICD-10-PCS | Mod: CPTII,S$GLB,, | Performed by: OBSTETRICS & GYNECOLOGY

## 2023-07-31 PROCEDURE — 99999 PR PBB SHADOW E&M-EST. PATIENT-LVL III: CPT | Mod: PBBFAC,,, | Performed by: OBSTETRICS & GYNECOLOGY

## 2023-07-31 PROCEDURE — 1160F RVW MEDS BY RX/DR IN RCRD: CPT | Mod: CPTII,S$GLB,, | Performed by: OBSTETRICS & GYNECOLOGY

## 2023-07-31 PROCEDURE — 3072F LOW RISK FOR RETINOPATHY: CPT | Mod: CPTII,S$GLB,, | Performed by: OBSTETRICS & GYNECOLOGY

## 2023-07-31 PROCEDURE — 1160F PR REVIEW ALL MEDS BY PRESCRIBER/CLIN PHARMACIST DOCUMENTED: ICD-10-PCS | Mod: CPTII,S$GLB,, | Performed by: OBSTETRICS & GYNECOLOGY

## 2023-07-31 PROCEDURE — 99396 PREV VISIT EST AGE 40-64: CPT | Mod: S$GLB,,, | Performed by: OBSTETRICS & GYNECOLOGY

## 2023-07-31 PROCEDURE — 3061F PR NEG MICROALBUMINURIA RESULT DOCUMENTED/REVIEW: ICD-10-PCS | Mod: CPTII,S$GLB,, | Performed by: OBSTETRICS & GYNECOLOGY

## 2023-07-31 PROCEDURE — 88142 CYTOPATH C/V THIN LAYER: CPT | Performed by: OBSTETRICS & GYNECOLOGY

## 2023-07-31 PROCEDURE — 3074F SYST BP LT 130 MM HG: CPT | Mod: CPTII,S$GLB,, | Performed by: OBSTETRICS & GYNECOLOGY

## 2023-07-31 PROCEDURE — 99396 PR PREVENTIVE VISIT,EST,40-64: ICD-10-PCS | Mod: S$GLB,,, | Performed by: OBSTETRICS & GYNECOLOGY

## 2023-07-31 PROCEDURE — 3074F PR MOST RECENT SYSTOLIC BLOOD PRESSURE < 130 MM HG: ICD-10-PCS | Mod: CPTII,S$GLB,, | Performed by: OBSTETRICS & GYNECOLOGY

## 2023-07-31 PROCEDURE — 1159F PR MEDICATION LIST DOCUMENTED IN MEDICAL RECORD: ICD-10-PCS | Mod: CPTII,S$GLB,, | Performed by: OBSTETRICS & GYNECOLOGY

## 2023-07-31 PROCEDURE — 3008F PR BODY MASS INDEX (BMI) DOCUMENTED: ICD-10-PCS | Mod: CPTII,S$GLB,, | Performed by: OBSTETRICS & GYNECOLOGY

## 2023-07-31 PROCEDURE — 3078F DIAST BP <80 MM HG: CPT | Mod: CPTII,S$GLB,, | Performed by: OBSTETRICS & GYNECOLOGY

## 2023-07-31 PROCEDURE — 3066F NEPHROPATHY DOC TX: CPT | Mod: CPTII,S$GLB,, | Performed by: OBSTETRICS & GYNECOLOGY

## 2023-07-31 PROCEDURE — 3044F HG A1C LEVEL LT 7.0%: CPT | Mod: CPTII,S$GLB,, | Performed by: OBSTETRICS & GYNECOLOGY

## 2023-07-31 PROCEDURE — 3072F PR LOW RISK FOR RETINOPATHY: ICD-10-PCS | Mod: CPTII,S$GLB,, | Performed by: OBSTETRICS & GYNECOLOGY

## 2023-07-31 PROCEDURE — 3008F BODY MASS INDEX DOCD: CPT | Mod: CPTII,S$GLB,, | Performed by: OBSTETRICS & GYNECOLOGY

## 2023-07-31 PROCEDURE — 1159F MED LIST DOCD IN RCRD: CPT | Mod: CPTII,S$GLB,, | Performed by: OBSTETRICS & GYNECOLOGY

## 2023-07-31 PROCEDURE — 3066F PR DOCUMENTATION OF TREATMENT FOR NEPHROPATHY: ICD-10-PCS | Mod: CPTII,S$GLB,, | Performed by: OBSTETRICS & GYNECOLOGY

## 2023-07-31 PROCEDURE — 3061F NEG MICROALBUMINURIA REV: CPT | Mod: CPTII,S$GLB,, | Performed by: OBSTETRICS & GYNECOLOGY

## 2023-07-31 PROCEDURE — 3044F PR MOST RECENT HEMOGLOBIN A1C LEVEL <7.0%: ICD-10-PCS | Mod: CPTII,S$GLB,, | Performed by: OBSTETRICS & GYNECOLOGY

## 2023-07-31 PROCEDURE — 99999 PR PBB SHADOW E&M-EST. PATIENT-LVL III: ICD-10-PCS | Mod: PBBFAC,,, | Performed by: OBSTETRICS & GYNECOLOGY

## 2023-07-31 RX ORDER — AMOXICILLIN 500 MG/1
TABLET, FILM COATED ORAL
COMMUNITY
End: 2024-02-26

## 2023-07-31 RX ORDER — ACETAMINOPHEN AND CODEINE PHOSPHATE 300; 30 MG/1; MG/1
TABLET ORAL
COMMUNITY
End: 2024-02-26

## 2023-07-31 RX ORDER — ESTRADIOL 2 MG/1
2 SYSTEM VAGINAL
Qty: 1 EACH | Refills: 3 | Status: SHIPPED | OUTPATIENT
Start: 2023-07-31 | End: 2024-03-13

## 2023-07-31 RX ORDER — DOXYCYCLINE 100 MG/1
CAPSULE ORAL
COMMUNITY
End: 2024-02-26

## 2023-07-31 RX ORDER — BLOOD-GLUCOSE METER
EACH MISCELLANEOUS
COMMUNITY
Start: 2023-04-24

## 2023-07-31 RX ORDER — PHENTERMINE HYDROCHLORIDE 37.5 MG/1
0.5 TABLET ORAL 2 TIMES DAILY
COMMUNITY
End: 2024-02-26

## 2023-07-31 RX ORDER — AMITRIPTYLINE HYDROCHLORIDE 10 MG/1
1 TABLET, FILM COATED ORAL NIGHTLY
COMMUNITY
End: 2024-02-26

## 2023-07-31 RX ORDER — METHOCARBAMOL 750 MG/1
1 TABLET, FILM COATED ORAL 3 TIMES DAILY
COMMUNITY
End: 2024-02-26

## 2023-07-31 RX ORDER — HYDROCODONE BITARTRATE AND ACETAMINOPHEN 5; 325 MG/1; MG/1
TABLET ORAL
COMMUNITY
End: 2024-02-26

## 2023-07-31 RX ORDER — GLUCOSAM/CHON-MSM1/C/MANG/BOSW 500-416.6
TABLET ORAL
COMMUNITY
Start: 2023-07-19

## 2023-07-31 NOTE — PROGRESS NOTES
Chief Complaint   Patient presents with    Well Woman       HISTORY OF PRESENT ILLNESS:   Joanne Stallings is a 57 y.o. female  S/p supracervical hysterectomy who presents for well woman exam.  No LMP recorded (lmp unknown). Patient has had a hysterectomy.. Menopause at age  2007 after hysterectomy .  She has been avoiding sex due to discomfort and wants to restart medications. Didn't like vaginal cream.      Past Medical History:   Diagnosis Date    Chronic constipation     Depression     Diabetes mellitus     Hyperlipidemia           Past Surgical History:   Procedure Laterality Date     SECTION      COLONOSCOPY N/A 2016    Procedure: COLONOSCOPY;  Surgeon: Angel Sawyer MD;  Location: [facility] ENDO ([address] [place] [address]);  Service: Endoscopy;  Laterality: N/A;    HYSTERECTOMY      ALLIE, ovarires and cervix  remain (fibroids)    SURGICAL REMOVAL OF MASS OF AXILLA Right 10/20/2020    Procedure: EXCISION, MASS, AXILLARY;  Surgeon: Ted Baumann MD;  Location: [facility] OR;  Service: General;  Laterality: Right;         Social History     Socioeconomic History    Marital status: Single   Tobacco Use    Smoking status: Never    Smokeless tobacco: Never   Substance and Sexual Activity    Alcohol use: Yes     Alcohol/week: 1.0 standard drink of alcohol     Types: 1 Glasses of wine per week     Comment: occasional use only/ not weekly    Drug use: No    Sexual activity: Yes     Partners: Male     Birth control/protection: None   Social History Narrative    Work:      Family: Patient is  2 children.     Habits: Nonsmoker, occasional etoh    Diet/Exercise:     Other:         Social Determinants of Health     Financial Resource Strain: Low Risk  (2021)    Overall Financial Resource Strain (CARDIA)     Difficulty of Paying Living Expenses: Not hard at all   Food Insecurity: No Food Insecurity (2021)    Hunger Vital Sign     Worried About Running Out of Food in the Last Year: Never true     Ran Out of Food  in the Last Year: Never true   Transportation Needs: No Transportation Needs (9/30/2021)    PRAPARE - Transportation     Lack of Transportation (Medical): No     Lack of Transportation (Non-Medical): No   Physical Activity: Inactive (12/14/2021)    Exercise Vital Sign     Days of Exercise per Week: 0 days     Minutes of Exercise per Session: 0 min   Stress: No Stress Concern Present (12/14/2021)    [employer] - Occupational Stress Questionnaire     Feeling of Stress : Only a little   Recent Concern: Stress - Stress Concern Present (9/30/2021)    [employer] - Occupational Stress Questionnaire     Feeling of Stress : To some extent   Social Connections: Unknown (12/14/2021)    Social Connection and Isolation Panel [NHANES]     Frequency of Communication with Friends and Family: More than three times a week     Frequency of Social Gatherings with Friends and Family: Once a week     Active Member of Clubs or Organizations: No     Attends Club or Organization Meetings: Never     Marital Status: Living with partner   Housing Stability: Low Risk  (12/14/2021)    Housing Stability Vital Sign     Unable to Pay for Housing in the Last Year: No     Number of Places Lived in the Last Year: 1     Unstable Housing in the Last Year: No       Family History   Problem Relation Age of Onset    Diabetes Mother     Hypertension Mother     Hyperlipidemia Mother     Glaucoma Mother     Lung cancer Father         lung    Arthritis Father     Cancer Father     Glaucoma Maternal Grandmother     No Known Problems Sister     No Known Problems Brother     No Known Problems Maternal Aunt     No Known Problems Maternal Uncle     No Known Problems Paternal Aunt     No Known Problems Paternal Uncle     No Known Problems Maternal Grandfather     No Known Problems Paternal Grandmother     No Known Problems Paternal Grandfather     Breast cancer Neg Hx     Colon cancer Neg Hx     Ovarian  "cancer Neg Hx     Cataracts Neg Hx     Macular degeneration Neg Hx     Retinal detachment Neg Hx     Strabismus Neg Hx     Blindness Neg Hx     Amblyopia Neg Hx          OB History    Para Term  AB Living   2 2 2     2   SAB IAB Ectopic Multiple Live Births           2      # Outcome Date GA Lbr Ladarius/2nd Weight Sex Delivery Anes PTL Lv   2 Term      CS-LTranv  N DENTON   1 Term      CS-LTranv  N DENTON       COMPREHENSIVE GYN HISTORY:  PAP History: Denies abnormal Paps  Infection History: Denies STDs. Denies PID.  Benign History: had uterine fibroids. Denies ovarian cysts. Denies endometriosis Denies other conditions.  Cancer History: Denies cervical cancer. Denies uterine cancer or hyperplasia. Denies ovarian cancer. Denies vulvar cancer or pre-cancer. Denies vaginal cancer or pre-cancer. Denies breast cancer. Denies colon cancer.  Cycle: / heavy   Supracervical hyst 2/2 AUB and fibroids     ROS:  Negative       /78   Ht 4' 11" (1.499 m)   Wt 66.7 kg (147 lb 0.8 oz)   LMP  (LMP Unknown)   BMI 29.70 kg/m²     APPEARANCE: Well nourished, well developed, in no acute distress.    BREASTS: Symmetrical, no skin changes or visible lesions. No palpable masses, nipple discharge or adenopathy bilaterally.  PELVIC:   VULVA: No lesions. Normal female genitalia.  URETHRAL MEATUS: Normal size and location, no lesions, no prolapse.  URETHRA: No masses, tenderness, prolapse or scarring.  VAGINA: atrophic, no discharge, no significant cystocele or rectocele.  CERVIX: No lesions and discharge.  UTERUS: surgically absent   ADNEXA: No masses or tenderness.  PERINEUM: Normal, mo masses    Data Reviewed:       Lipid profile: Date:   Lab Results   Component Value Date    CHOL 284 (H) 2023    CHOL 300 (H) 04/15/2023    CHOL 265 (H) 2021     Lab Results   Component Value Date    HDL 48 2023    HDL 52 04/15/2023    HDL 43 2021     Lab Results   Component Value Date    LDLCALC 215.6 (H) " 07/29/2023    LDLCALC 227.4 (H) 04/15/2023    LDLCALC 205.4 (H) 12/18/2021     Lab Results   Component Value Date    TRIG 102 07/29/2023    TRIG 103 04/15/2023    TRIG 83 12/18/2021     Lab Results   Component Value Date    CHOLHDL 16.9 (L) 07/29/2023    CHOLHDL 17.3 (L) 04/15/2023    CHOLHDL 16.2 (L) 12/18/2021     TSH:   Lab Results   Component Value Date    TSH 1.535 04/15/2023     Colonoscopy Date: 2016, repeat 2026    1. Encounter for annual routine gynecological examination    2. Vaginal atrophy    3. Pap smear for cervical cancer screening        A/P 1. Routine gyn annual exam. s/p normal breast exam and MMG ordered.  Pap with HPV cotesting up to date, repeat regular pap smear done today.  Lipid Profile, needed every 5 years, up to date. Fasting glucose, needed every 3 years, up to date.   TSH, needed every 5 years, up to date.   Colonoscopy up to date.   2.  Discussed opitions for vaginal estrogen pill vs ring vs cream vs suppository vs osphenia. She would like to do the ring. Sent in, will let us know if she needs help putting it in.     F/u in 1 yr or PRN

## 2023-08-01 ENCOUNTER — PATIENT MESSAGE (OUTPATIENT)
Dept: OBSTETRICS AND GYNECOLOGY | Facility: CLINIC | Age: 58
End: 2023-08-01
Payer: COMMERCIAL

## 2023-08-04 ENCOUNTER — PATIENT MESSAGE (OUTPATIENT)
Dept: OBSTETRICS AND GYNECOLOGY | Facility: CLINIC | Age: 58
End: 2023-08-04
Payer: COMMERCIAL

## 2023-08-04 LAB
FINAL PATHOLOGIC DIAGNOSIS: NORMAL
Lab: NORMAL

## 2023-08-04 RX ORDER — METRONIDAZOLE 500 MG/1
500 TABLET ORAL EVERY 12 HOURS
Qty: 14 TABLET | Refills: 0 | Status: SHIPPED | OUTPATIENT
Start: 2023-08-04 | End: 2023-08-11

## 2023-08-08 RX ORDER — ESTRADIOL 10 UG/1
INSERT VAGINAL
Qty: 30 TABLET | Refills: 1 | Status: SHIPPED | OUTPATIENT
Start: 2023-08-08 | End: 2024-02-26

## 2023-08-14 ENCOUNTER — PATIENT MESSAGE (OUTPATIENT)
Dept: ADMINISTRATIVE | Facility: HOSPITAL | Age: 58
End: 2023-08-14
Payer: COMMERCIAL

## 2023-08-16 ENCOUNTER — HOSPITAL ENCOUNTER (OUTPATIENT)
Dept: RADIOLOGY | Facility: HOSPITAL | Age: 58
Discharge: HOME | End: 2023-08-16
Attending: INTERNAL MEDICINE
Payer: COMMERCIAL

## 2023-08-16 DIAGNOSIS — R92.8 ABNORMAL MAMMOGRAPHY: ICD-10-CM

## 2023-08-16 PROCEDURE — 77061 MAMMO DIGITAL DIAGNOSTIC LEFT WITH TOMO: ICD-10-PCS | Mod: 26,LT,, | Performed by: RADIOLOGY

## 2023-08-16 PROCEDURE — 77065 DX MAMMO INCL CAD UNI: CPT | Mod: 26,LT,, | Performed by: RADIOLOGY

## 2023-08-16 PROCEDURE — 77061 BREAST TOMOSYNTHESIS UNI: CPT | Mod: TC,LT

## 2023-08-16 PROCEDURE — 77061 BREAST TOMOSYNTHESIS UNI: CPT | Mod: 26,LT,, | Performed by: RADIOLOGY

## 2023-08-16 PROCEDURE — 77065 MAMMO DIGITAL DIAGNOSTIC LEFT WITH TOMO: ICD-10-PCS | Mod: 26,LT,, | Performed by: RADIOLOGY

## 2023-08-23 ENCOUNTER — PATIENT MESSAGE (OUTPATIENT)
Dept: OBSTETRICS AND GYNECOLOGY | Facility: CLINIC | Age: 58
End: 2023-08-23
Payer: COMMERCIAL

## 2023-08-23 ENCOUNTER — PATIENT OUTREACH (OUTPATIENT)
Dept: ADMINISTRATIVE | Facility: HOSPITAL | Age: 58
End: 2023-08-23
Payer: COMMERCIAL

## 2023-08-23 NOTE — PROGRESS NOTES
Chart reviewed.   Immunizations: Reconciled  Orders placed: N/A  Upcoming appts to satisfy [facility] topics: Optometry 3/26/2023

## 2023-09-28 ENCOUNTER — OFFICE VISIT (OUTPATIENT)
Dept: OBSTETRICS AND GYNECOLOGY | Facility: CLINIC | Age: 58
End: 2023-09-28
Payer: COMMERCIAL

## 2023-09-28 VITALS
WEIGHT: 152.31 LBS | SYSTOLIC BLOOD PRESSURE: 144 MMHG | BODY MASS INDEX: 30.77 KG/M2 | DIASTOLIC BLOOD PRESSURE: 77 MMHG

## 2023-09-28 DIAGNOSIS — N89.8 VAGINAL IRRITATION: Primary | ICD-10-CM

## 2023-09-28 DIAGNOSIS — N95.2 VAGINAL ATROPHY: ICD-10-CM

## 2023-09-28 PROCEDURE — 3066F NEPHROPATHY DOC TX: CPT | Mod: CPTII,S$GLB,, | Performed by: OBSTETRICS & GYNECOLOGY

## 2023-09-28 PROCEDURE — 3077F PR MOST RECENT SYSTOLIC BLOOD PRESSURE >= 140 MM HG: ICD-10-PCS | Mod: CPTII,S$GLB,, | Performed by: OBSTETRICS & GYNECOLOGY

## 2023-09-28 PROCEDURE — 99213 PR OFFICE/OUTPT VISIT, EST, LEVL III, 20-29 MIN: ICD-10-PCS | Mod: S$GLB,,, | Performed by: OBSTETRICS & GYNECOLOGY

## 2023-09-28 PROCEDURE — 3078F DIAST BP <80 MM HG: CPT | Mod: CPTII,S$GLB,, | Performed by: OBSTETRICS & GYNECOLOGY

## 2023-09-28 PROCEDURE — 3044F HG A1C LEVEL LT 7.0%: CPT | Mod: CPTII,S$GLB,, | Performed by: OBSTETRICS & GYNECOLOGY

## 2023-09-28 PROCEDURE — 3008F BODY MASS INDEX DOCD: CPT | Mod: CPTII,S$GLB,, | Performed by: OBSTETRICS & GYNECOLOGY

## 2023-09-28 PROCEDURE — 1159F PR MEDICATION LIST DOCUMENTED IN MEDICAL RECORD: ICD-10-PCS | Mod: CPTII,S$GLB,, | Performed by: OBSTETRICS & GYNECOLOGY

## 2023-09-28 PROCEDURE — 3072F PR LOW RISK FOR RETINOPATHY: ICD-10-PCS | Mod: CPTII,S$GLB,, | Performed by: OBSTETRICS & GYNECOLOGY

## 2023-09-28 PROCEDURE — 1159F MED LIST DOCD IN RCRD: CPT | Mod: CPTII,S$GLB,, | Performed by: OBSTETRICS & GYNECOLOGY

## 2023-09-28 PROCEDURE — 3077F SYST BP >= 140 MM HG: CPT | Mod: CPTII,S$GLB,, | Performed by: OBSTETRICS & GYNECOLOGY

## 2023-09-28 PROCEDURE — 3044F PR MOST RECENT HEMOGLOBIN A1C LEVEL <7.0%: ICD-10-PCS | Mod: CPTII,S$GLB,, | Performed by: OBSTETRICS & GYNECOLOGY

## 2023-09-28 PROCEDURE — 3061F NEG MICROALBUMINURIA REV: CPT | Mod: CPTII,S$GLB,, | Performed by: OBSTETRICS & GYNECOLOGY

## 2023-09-28 PROCEDURE — 99999 PR PBB SHADOW E&M-EST. PATIENT-LVL III: CPT | Mod: PBBFAC,,, | Performed by: OBSTETRICS & GYNECOLOGY

## 2023-09-28 PROCEDURE — 3066F PR DOCUMENTATION OF TREATMENT FOR NEPHROPATHY: ICD-10-PCS | Mod: CPTII,S$GLB,, | Performed by: OBSTETRICS & GYNECOLOGY

## 2023-09-28 PROCEDURE — 3061F PR NEG MICROALBUMINURIA RESULT DOCUMENTED/REVIEW: ICD-10-PCS | Mod: CPTII,S$GLB,, | Performed by: OBSTETRICS & GYNECOLOGY

## 2023-09-28 PROCEDURE — 3072F LOW RISK FOR RETINOPATHY: CPT | Mod: CPTII,S$GLB,, | Performed by: OBSTETRICS & GYNECOLOGY

## 2023-09-28 PROCEDURE — 3078F PR MOST RECENT DIASTOLIC BLOOD PRESSURE < 80 MM HG: ICD-10-PCS | Mod: CPTII,S$GLB,, | Performed by: OBSTETRICS & GYNECOLOGY

## 2023-09-28 PROCEDURE — 81514 NFCT DS BV&VAGINITIS DNA ALG: CPT | Performed by: OBSTETRICS & GYNECOLOGY

## 2023-09-28 PROCEDURE — 99999 PR PBB SHADOW E&M-EST. PATIENT-LVL III: ICD-10-PCS | Mod: PBBFAC,,, | Performed by: OBSTETRICS & GYNECOLOGY

## 2023-09-28 PROCEDURE — 99213 OFFICE O/P EST LOW 20 MIN: CPT | Mod: S$GLB,,, | Performed by: OBSTETRICS & GYNECOLOGY

## 2023-09-28 PROCEDURE — 87591 N.GONORRHOEAE DNA AMP PROB: CPT | Performed by: OBSTETRICS & GYNECOLOGY

## 2023-09-28 PROCEDURE — 3008F PR BODY MASS INDEX (BMI) DOCUMENTED: ICD-10-PCS | Mod: CPTII,S$GLB,, | Performed by: OBSTETRICS & GYNECOLOGY

## 2023-09-28 NOTE — PROGRESS NOTES
Chief Complaint   Patient presents with    vaginal irritaion      U/a-trace blood       HISTORY OF PRESENT ILLNESS:   Joanne Stallings is a 57 y.o. female  S/p supracervical hysterectomy who presents for vaginal irritation and possible yeast infection.  She has been avoiding sex due to discomfort and wants to restart medications. Didn't like vaginal cream and we had trouble getting the medications approved. The irritation seems to be coming after intercourse. Partner is not circumcised.      Past Medical History:   Diagnosis Date    Chronic constipation     Depression     Diabetes mellitus     Hyperlipidemia           Past Surgical History:   Procedure Laterality Date     SECTION      COLONOSCOPY N/A 2016    Procedure: COLONOSCOPY;  Surgeon: Angel Sawyer MD;  Location: [facility] ([address] [place] [address]);  Service: Endoscopy;  Laterality: N/A;    HYSTERECTOMY      ALLIE, ovarires and cervix  remain (fibroids)    SURGICAL REMOVAL OF MASS OF AXILLA Right 10/20/2020    Procedure: EXCISION, MASS, AXILLARY;  Surgeon: Ted Baumann MD;  Location: [facility];  Service: General;  Laterality: Right;         Social History     Socioeconomic History    Marital status: Single   Tobacco Use    Smoking status: Never    Smokeless tobacco: Never   Substance and Sexual Activity    Alcohol use: Yes     Alcohol/week: 1.0 standard drink of alcohol     Types: 1 Glasses of wine per week     Comment: occasional use only/ not weekly    Drug use: No    Sexual activity: Yes     Partners: Male     Birth control/protection: None   Social History Narrative    Work:      Family: Patient is  2 children.     Habits: Nonsmoker, occasional etoh    Diet/Exercise:     Other:         Social Determinants of Health     Financial Resource Strain: Low Risk  (2021)    Overall Financial Resource Strain (CARDIA)     Difficulty of Paying Living Expenses: Not hard at all   Food Insecurity: No Food Insecurity (2021)    Hunger Vital Sign      Worried About Running Out of Food in the Last Year: Never true     Ran Out of Food in the Last Year: Never true   Transportation Needs: No Transportation Needs (9/30/2021)    PRAPARE - Transportation     Lack of Transportation (Medical): No     Lack of Transportation (Non-Medical): No   Physical Activity: Inactive (12/14/2021)    Exercise Vital Sign     Days of Exercise per Week: 0 days     Minutes of Exercise per Session: 0 min   Stress: No Stress Concern Present (12/14/2021)    [place] [employer] [place] [employer] - Occupational Stress Questionnaire     Feeling of Stress : Only a little   Recent Concern: Stress - Stress Concern Present (9/30/2021)    [place] [employer] [place] [employer] - Occupational Stress Questionnaire     Feeling of Stress : To some extent   Social Connections: Unknown (12/14/2021)    Social Connection and Isolation Panel [NHANES]     Frequency of Communication with Friends and Family: More than three times a week     Frequency of Social Gatherings with Friends and Family: Once a week     Active Member of Clubs or Organizations: No     Attends Club or Organization Meetings: Never     Marital Status: Living with partner   Housing Stability: Low Risk  (12/14/2021)    Housing Stability Vital Sign     Unable to Pay for Housing in the Last Year: No     Number of Places Lived in the Last Year: 1     Unstable Housing in the Last Year: No       Family History   Problem Relation Age of Onset    Diabetes Mother     Hypertension Mother     Hyperlipidemia Mother     Glaucoma Mother     Lung cancer Father         lung    Arthritis Father     Cancer Father     Glaucoma Maternal Grandmother     No Known Problems Sister     No Known Problems Brother     No Known Problems Maternal Aunt     No Known Problems Maternal Uncle     No Known Problems Paternal Aunt     No Known Problems Paternal Uncle     No Known Problems Maternal Grandfather     No Known Problems Paternal Grandmother     No Known Problems  Paternal Grandfather     Breast cancer Neg Hx     Colon cancer Neg Hx     Ovarian cancer Neg Hx     Cataracts Neg Hx     Macular degeneration Neg Hx     Retinal detachment Neg Hx     Strabismus Neg Hx     Blindness Neg Hx     Amblyopia Neg Hx          OB History    Para Term  AB Living   2 2 2     2   SAB IAB Ectopic Multiple Live Births           2      # Outcome Date GA Lbr Ladarius/2nd Weight Sex Delivery Anes PTL Lv   2 Term      CS-LTranv  N DENTON   1 Term      CS-LTranv  N DENTON       COMPREHENSIVE GYN HISTORY:  PAP History: Denies abnormal Paps  Infection History: Denies STDs. Denies PID.  Benign History: had uterine fibroids. Denies ovarian cysts. Denies endometriosis Denies other conditions.  Cancer History: Denies cervical cancer. Denies uterine cancer or hyperplasia. Denies ovarian cancer. Denies vulvar cancer or pre-cancer. Denies vaginal cancer or pre-cancer. Denies breast cancer. Denies colon cancer.  Cycle:  heavy   Supracervical hyst 2/2 AUB and fibroids     ROS:  Negative       BP (!) 144/77   Wt 69.1 kg (152 lb 5.4 oz)   LMP  (LMP Unknown)   BMI 30.77 kg/m²     APPEARANCE: Well nourished, well developed, in no acute distress.    BREASTS: Symmetrical, no skin changes or visible lesions. No palpable masses, nipple discharge or adenopathy bilaterally.  PELVIC:   VULVA: No lesions. Normal female genitalia.  URETHRAL MEATUS: Normal size and location, no lesions, no prolapse.  URETHRA: No masses, tenderness, prolapse or scarring.  VAGINA: atrophic, white discharge, no significant cystocele or rectocele.  CERVIX: No lesions and discharge.  UTERUS: surgically absent   ADNEXA: No masses or tenderness.  PERINEUM: Normal, mo masses        1. Vaginal irritation    2. Vaginal atrophy        A/P 1. Swabs for yeast and bacteria done, if + for yeast then might want to have partner evaluated if seems to be related to intercourse.  Discussed opitions for vaginal estrogen pill vs ring vs cream vs  suppository vs osphenia. Called the pharmacy and the cream is her preferred with her insurance so she will try it.     Face to Face time with patient: 20 minutes of total time spent on the encounter, which includes face to face time and non-face to face time preparing to see the patient (eg, review of tests), Obtaining and/or reviewing separately obtained history, Documenting clinical information in the electronic or other health record, Independently interpreting results (not separately reported) and communicating results to the patient/family/caregiver, or Care coordination (not separately reported).

## 2023-09-29 LAB
C TRACH DNA SPEC QL NAA+PROBE: NOT DETECTED
N GONORRHOEA DNA SPEC QL NAA+PROBE: NOT DETECTED

## 2023-10-01 LAB
BACTERIAL VAGINOSIS DNA: NEGATIVE
CANDIDA GLABRATA DNA: NEGATIVE
CANDIDA KRUSEI DNA: NEGATIVE
CANDIDA RRNA VAG QL PROBE: NEGATIVE
T VAGINALIS RRNA GENITAL QL PROBE: NEGATIVE

## 2023-10-02 ENCOUNTER — OFFICE VISIT (OUTPATIENT)
Dept: INTERNAL MEDICINE | Facility: CLINIC | Age: 58
End: 2023-10-02
Payer: COMMERCIAL

## 2023-10-02 VITALS
DIASTOLIC BLOOD PRESSURE: 82 MMHG | BODY MASS INDEX: 32.96 KG/M2 | HEIGHT: 57 IN | RESPIRATION RATE: 15 BRPM | SYSTOLIC BLOOD PRESSURE: 138 MMHG | HEART RATE: 76 BPM | TEMPERATURE: 98 F | OXYGEN SATURATION: 99 % | WEIGHT: 152.75 LBS

## 2023-10-02 DIAGNOSIS — E78.49 OTHER HYPERLIPIDEMIA: ICD-10-CM

## 2023-10-02 DIAGNOSIS — E11.9 TYPE 2 DIABETES MELLITUS WITHOUT COMPLICATION, WITHOUT LONG-TERM CURRENT USE OF INSULIN: Primary | ICD-10-CM

## 2023-10-02 DIAGNOSIS — E66.9 CLASS 1 OBESITY WITH SERIOUS COMORBIDITY AND BODY MASS INDEX (BMI) OF 33.0 TO 33.9 IN ADULT, UNSPECIFIED OBESITY TYPE: ICD-10-CM

## 2023-10-02 PROCEDURE — 3075F SYST BP GE 130 - 139MM HG: CPT | Mod: CPTII,S$GLB,, | Performed by: INTERNAL MEDICINE

## 2023-10-02 PROCEDURE — 3075F PR MOST RECENT SYSTOLIC BLOOD PRESS GE 130-139MM HG: ICD-10-PCS | Mod: CPTII,S$GLB,, | Performed by: INTERNAL MEDICINE

## 2023-10-02 PROCEDURE — 99213 PR OFFICE/OUTPT VISIT, EST, LEVL III, 20-29 MIN: ICD-10-PCS | Mod: S$GLB,,, | Performed by: INTERNAL MEDICINE

## 2023-10-02 PROCEDURE — 3066F NEPHROPATHY DOC TX: CPT | Mod: CPTII,S$GLB,, | Performed by: INTERNAL MEDICINE

## 2023-10-02 PROCEDURE — 3061F PR NEG MICROALBUMINURIA RESULT DOCUMENTED/REVIEW: ICD-10-PCS | Mod: CPTII,S$GLB,, | Performed by: INTERNAL MEDICINE

## 2023-10-02 PROCEDURE — 3079F PR MOST RECENT DIASTOLIC BLOOD PRESSURE 80-89 MM HG: ICD-10-PCS | Mod: CPTII,S$GLB,, | Performed by: INTERNAL MEDICINE

## 2023-10-02 PROCEDURE — 3079F DIAST BP 80-89 MM HG: CPT | Mod: CPTII,S$GLB,, | Performed by: INTERNAL MEDICINE

## 2023-10-02 PROCEDURE — 99213 OFFICE O/P EST LOW 20 MIN: CPT | Mod: S$GLB,,, | Performed by: INTERNAL MEDICINE

## 2023-10-02 PROCEDURE — 3008F BODY MASS INDEX DOCD: CPT | Mod: CPTII,S$GLB,, | Performed by: INTERNAL MEDICINE

## 2023-10-02 PROCEDURE — 3044F HG A1C LEVEL LT 7.0%: CPT | Mod: CPTII,S$GLB,, | Performed by: INTERNAL MEDICINE

## 2023-10-02 PROCEDURE — 3066F PR DOCUMENTATION OF TREATMENT FOR NEPHROPATHY: ICD-10-PCS | Mod: CPTII,S$GLB,, | Performed by: INTERNAL MEDICINE

## 2023-10-02 PROCEDURE — 3044F PR MOST RECENT HEMOGLOBIN A1C LEVEL <7.0%: ICD-10-PCS | Mod: CPTII,S$GLB,, | Performed by: INTERNAL MEDICINE

## 2023-10-02 PROCEDURE — 3008F PR BODY MASS INDEX (BMI) DOCUMENTED: ICD-10-PCS | Mod: CPTII,S$GLB,, | Performed by: INTERNAL MEDICINE

## 2023-10-02 PROCEDURE — 99999 PR PBB SHADOW E&M-EST. PATIENT-LVL V: ICD-10-PCS | Mod: PBBFAC,,, | Performed by: INTERNAL MEDICINE

## 2023-10-02 PROCEDURE — 3061F NEG MICROALBUMINURIA REV: CPT | Mod: CPTII,S$GLB,, | Performed by: INTERNAL MEDICINE

## 2023-10-02 PROCEDURE — 99999 PR PBB SHADOW E&M-EST. PATIENT-LVL V: CPT | Mod: PBBFAC,,, | Performed by: INTERNAL MEDICINE

## 2023-10-02 RX ORDER — SEMAGLUTIDE 1.34 MG/ML
1 INJECTION, SOLUTION SUBCUTANEOUS
Qty: 3 ML | Refills: 11 | Status: SHIPPED | OUTPATIENT
Start: 2023-10-02 | End: 2024-10-01

## 2023-10-02 NOTE — PROGRESS NOTES
Subjective:       Patient ID: Joanne Stallings is a 58 y.o. female.    Chief Complaint: Follow-up    HPI  The patient presents for follow-up of medical conditions which include type 2 diabetes mellitus, and hyperlipidemia.  The patient also has obesity.  She is currently using Ozempic 0.5 mg subQ every 7 days.  She is not experienced abdominal pain or nausea.  She has chronic constipation which has not changed.  She admits to not using rosuvastatin on a daily basis for management of her hyperlipidemia.  She feels her diet has remained fairly stable.  She has not been exercising on a regular basis although she remains active.    Review of Systems   Constitutional:  Negative for activity change, appetite change and unexpected weight change.   Eyes:  Negative for visual disturbance.   Respiratory:  Negative for shortness of breath.    Cardiovascular:  Negative for chest pain, palpitations and leg swelling.   Gastrointestinal:  Positive for constipation. Negative for abdominal pain, blood in stool and diarrhea.   Genitourinary:  Negative for dysuria, frequency, hematuria and urgency.   Neurological:  Negative for weakness, numbness and headaches.   Psychiatric/Behavioral:  Negative for sleep disturbance.             Physical Exam  Vitals and nursing note reviewed.   Constitutional:       General: She is not in acute distress.     Appearance: Normal appearance. She is well-developed.      Comments: The patient's weight has remained stable since 04/24/2023.   HENT:      Head: Normocephalic and atraumatic.   Eyes:      General: No scleral icterus.     Extraocular Movements: Extraocular movements intact.      Conjunctiva/sclera: Conjunctivae normal.   Neck:      Thyroid: No thyromegaly.      Vascular: No JVD.   Cardiovascular:      Rate and Rhythm: Normal rate and regular rhythm.      Heart sounds: Normal heart sounds. No murmur heard.     No friction rub. No gallop.   Pulmonary:      Effort: Pulmonary effort is normal.  No respiratory distress.      Breath sounds: Normal breath sounds. No wheezing or rales.   Abdominal:      General: Bowel sounds are normal.      Palpations: Abdomen is soft. There is no mass.      Tenderness: There is no abdominal tenderness. There is no right CVA tenderness or left CVA tenderness.   Musculoskeletal:         General: No tenderness. Normal range of motion.      Cervical back: Normal range of motion and neck supple.      Right lower leg: No edema.      Left lower leg: No edema.   Lymphadenopathy:      Cervical: No cervical adenopathy.   Skin:     General: Skin is warm and dry.      Findings: No rash.      Comments: No foot lesions are present.   Neurological:      Mental Status: She is alert and oriented to person, place, and time.      Cranial Nerves: No cranial nerve deficit.      Comments: Sensory exam is intact in both feet on monofilament and vibration testing.   Psychiatric:         Mood and Affect: Mood normal.         Behavior: Behavior normal.       Protective Sensation (w/ 10 gram monofilament):  Right: Intact  Left: Intact    Visual Inspection:  Normal -  Bilateral    Pedal Pulses:   Right: Present  Left: Present    Posterior Tibialis Pulses:   Right:Present  Left: Present      Lab Visit on 07/29/2023   Component Date Value Ref Range Status    Sodium 07/29/2023 142  136 - 145 mmol/L Final    Potassium 07/29/2023 4.6  3.5 - 5.1 mmol/L Final    Chloride 07/29/2023 108  95 - 110 mmol/L Final    CO2 07/29/2023 25  23 - 29 mmol/L Final    Glucose 07/29/2023 94  70 - 110 mg/dL Final    BUN 07/29/2023 12  6 - 20 mg/dL Final    Creatinine 07/29/2023 0.8  0.5 - 1.4 mg/dL Final    Calcium 07/29/2023 9.9  8.7 - 10.5 mg/dL Final    Total Protein 07/29/2023 7.5  6.0 - 8.4 g/dL Final    Albumin 07/29/2023 3.8  3.5 - 5.2 g/dL Final    Total Bilirubin 07/29/2023 0.4  0.1 - 1.0 mg/dL Final    Comment: For infants and newborns, interpretation of results should be based  on gestational age, weight and in  agreement with clinical  observations.    Premature Infant recommended reference ranges:  Up to 24 hours.............<8.0 mg/dL  Up to 48 hours............<12.0 mg/dL  3-5 days..................<15.0 mg/dL  6-29 days.................<15.0 mg/dL      Alkaline Phosphatase 07/29/2023 64  55 - 135 U/L Final    AST 07/29/2023 19  10 - 40 U/L Final    ALT 07/29/2023 11  10 - 44 U/L Final    eGFR 07/29/2023 >60.0  >60 mL/min/1.73 m^2 Final    Anion Gap 07/29/2023 9  8 - 16 mmol/L Final    Cholesterol 07/29/2023 284 (H)  120 - 199 mg/dL Final    Comment: The National Cholesterol Education Program (NCEP) has set the  following guidelines (reference ranges) for Cholesterol:  Optimal.....................<200 mg/dL  Borderline High.............200-239 mg/dL  High........................> or = 240 mg/dL      Triglycerides 07/29/2023 102  30 - 150 mg/dL Final    Comment: The National Cholesterol Education Program (NCEP) has set the  following guidelines (reference values) for triglycerides:  Normal......................<150 mg/dL  Borderline High.............150-199 mg/dL  High........................200-499 mg/dL      HDL 07/29/2023 48  40 - 75 mg/dL Final    Comment: The National Cholesterol Education Program (NCEP) has set the  following guidelines (reference values) for HDL Cholesterol:  Low...............<40 mg/dL  Optimal...........>60 mg/dL      LDL Cholesterol 07/29/2023 215.6 (H)  63.0 - 159.0 mg/dL Final    Comment: The National Cholesterol Education Program (NCEP) has set the  following guidelines (reference values) for LDL Cholesterol:  Optimal.......................<130 mg/dL  Borderline High...............130-159 mg/dL  High..........................160-189 mg/dL  Very High.....................>190 mg/dL      HDL/Cholesterol Ratio 07/29/2023 16.9 (L)  20.0 - 50.0 % Final    Total Cholesterol/HDL Ratio 07/29/2023 5.9 (H)  2.0 - 5.0 Final    Non-HDL Cholesterol 07/29/2023 236  mg/dL Final    Comment: Risk category and  Non-HDL cholesterol goals:  Coronary heart disease (CHD)or equivalent (10-year risk of CHD >20%):  Non-HDL cholesterol goal     <130 mg/dL  Two or more CHD risk factors and 10-year risk of CHD <= 20%:  Non-HDL cholesterol goal     <160 mg/dL  0 to 1 CHD risk factor:  Non-HDL cholesterol goal     <190 mg/dL      Hemoglobin A1C 07/29/2023 6.1 (H)  4.0 - 5.6 % Final    Comment: ADA Screening Guidelines:  5.7-6.4%  Consistent with prediabetes  >or=6.5%  Consistent with diabetes    High levels of fetal hemoglobin interfere with the HbA1C  assay. Heterozygous hemoglobin variants (HbS, HgC, etc)do  not significantly interfere with this assay.   However, presence of multiple variants may affect accuracy.      Estimated Avg Glucose 07/29/2023 128  68 - 131 mg/dL Final       Assessment & Plan:      Joanne was seen today for follow-up.  The dose of Ozempic will be increased to 1 mg subQ every 7 days.  Fasting blood tests will be obtained in 4 months.  The patient has been encouraged to use rosuvastatin on a daily basis for management of her hyperlipidemia.    The patient declines taking the flu vaccine today.    Diagnoses and all orders for this visit:    Type 2 diabetes mellitus without complication, without long-term current use of insulin  -     semaglutide (OZEMPIC) 1 mg/dose (4 mg/3 mL); Inject 1 mg into the skin every 7 days.  -     Comprehensive Metabolic Panel; Future  -     Lipid Panel; Future  -     CBC Auto Differential; Future  -     Hemoglobin A1C; Future    Other hyperlipidemia  -     Comprehensive Metabolic Panel; Future  -     Lipid Panel; Future  -     CBC Auto Differential; Future  -     Hemoglobin A1C; Future    Class 1 obesity with serious comorbidity and body mass index (BMI) of 33.0 to 33.9 in adult, unspecified obesity type         Follow up in about 4 months (around 2/2/2024).     Alonso Phillips MD

## 2023-10-06 ENCOUNTER — PATIENT MESSAGE (OUTPATIENT)
Dept: OBSTETRICS AND GYNECOLOGY | Facility: CLINIC | Age: 58
End: 2023-10-06
Payer: COMMERCIAL

## 2023-10-18 ENCOUNTER — OFFICE VISIT (OUTPATIENT)
Dept: OPTOMETRY | Facility: CLINIC | Age: 58
End: 2023-10-18
Payer: COMMERCIAL

## 2023-10-18 DIAGNOSIS — E11.9 TYPE 2 DIABETES MELLITUS WITHOUT RETINOPATHY: Primary | ICD-10-CM

## 2023-10-18 DIAGNOSIS — H52.13 MYOPIA WITH PRESBYOPIA OF BOTH EYES: ICD-10-CM

## 2023-10-18 DIAGNOSIS — Z97.3 WEARS CONTACT LENSES: ICD-10-CM

## 2023-10-18 DIAGNOSIS — Z46.0 FITTING AND ADJUSTMENT OF SPECTACLES AND CONTACT LENSES: Primary | ICD-10-CM

## 2023-10-18 DIAGNOSIS — H52.4 MYOPIA WITH PRESBYOPIA OF BOTH EYES: ICD-10-CM

## 2023-10-18 DIAGNOSIS — H25.13 NUCLEAR SCLEROSIS, BILATERAL: ICD-10-CM

## 2023-10-18 PROCEDURE — 92014 COMPRE OPH EXAM EST PT 1/>: CPT | Mod: S$GLB,,, | Performed by: OPTOMETRIST

## 2023-10-18 PROCEDURE — 3061F PR NEG MICROALBUMINURIA RESULT DOCUMENTED/REVIEW: ICD-10-PCS | Mod: CPTII,S$GLB,, | Performed by: OPTOMETRIST

## 2023-10-18 PROCEDURE — 3061F NEG MICROALBUMINURIA REV: CPT | Mod: CPTII,S$GLB,, | Performed by: OPTOMETRIST

## 2023-10-18 PROCEDURE — 3044F PR MOST RECENT HEMOGLOBIN A1C LEVEL <7.0%: ICD-10-PCS | Mod: CPTII,S$GLB,, | Performed by: OPTOMETRIST

## 2023-10-18 PROCEDURE — 3066F PR DOCUMENTATION OF TREATMENT FOR NEPHROPATHY: ICD-10-PCS | Mod: CPTII,S$GLB,, | Performed by: OPTOMETRIST

## 2023-10-18 PROCEDURE — 3066F NEPHROPATHY DOC TX: CPT | Mod: CPTII,S$GLB,, | Performed by: OPTOMETRIST

## 2023-10-18 PROCEDURE — 92310 PR CONTACT LENS FITTING (NO CHANGE): ICD-10-PCS | Mod: S$GLB,,, | Performed by: OPTOMETRIST

## 2023-10-18 PROCEDURE — 3044F HG A1C LEVEL LT 7.0%: CPT | Mod: CPTII,S$GLB,, | Performed by: OPTOMETRIST

## 2023-10-18 PROCEDURE — 99999 PR PBB SHADOW E&M-EST. PATIENT-LVL II: ICD-10-PCS | Mod: PBBFAC,,, | Performed by: OPTOMETRIST

## 2023-10-18 PROCEDURE — 92015 DETERMINE REFRACTIVE STATE: CPT | Mod: S$GLB,,, | Performed by: OPTOMETRIST

## 2023-10-18 PROCEDURE — 2023F PR DILATED RETINAL EXAM W/O EVID OF RETINOPATHY: ICD-10-PCS | Mod: CPTII,S$GLB,, | Performed by: OPTOMETRIST

## 2023-10-18 PROCEDURE — 99999 PR PBB SHADOW E&M-EST. PATIENT-LVL III: CPT | Mod: PBBFAC,,, | Performed by: OPTOMETRIST

## 2023-10-18 PROCEDURE — 92014 PR EYE EXAM, EST PATIENT,COMPREHESV: ICD-10-PCS | Mod: S$GLB,,, | Performed by: OPTOMETRIST

## 2023-10-18 PROCEDURE — 2023F DILAT RTA XM W/O RTNOPTHY: CPT | Mod: CPTII,S$GLB,, | Performed by: OPTOMETRIST

## 2023-10-18 PROCEDURE — 99999 PR PBB SHADOW E&M-EST. PATIENT-LVL III: ICD-10-PCS | Mod: PBBFAC,,, | Performed by: OPTOMETRIST

## 2023-10-18 PROCEDURE — 92015 PR REFRACTION: ICD-10-PCS | Mod: S$GLB,,, | Performed by: OPTOMETRIST

## 2023-10-18 PROCEDURE — 92310 CONTACT LENS FITTING OU: CPT | Mod: S$GLB,,, | Performed by: OPTOMETRIST

## 2023-10-18 PROCEDURE — 99999 PR PBB SHADOW E&M-EST. PATIENT-LVL II: CPT | Mod: PBBFAC,,, | Performed by: OPTOMETRIST

## 2023-10-18 NOTE — PROGRESS NOTES
HPI    59 Y/o female is here for routine eye exam with C/o about ocular health pt   would like to get fitted today for contact lens   Pt states OS is sometimes itchy, and tearing, some crusting in morning   Pt denies pain and discomfort   No f/f    Eye med: [employer] OU PRN   Last edited by Kevin Montemayor MA on 10/18/2023  1:36 PM.            Assessment /Plan     For exam results, see Encounter Report.    Type 2 diabetes mellitus without retinopathy    Myopia with presbyopia of both eyes    Wears contact lenses    Nuclear sclerosis, bilateral      Cat OU--pt wishes new Rx (dist only--removes toread)  Good fit w AIR OPTIX--pt happy  DM- WITHOUT RETINOPATHY.  Advised yearly DFE   Lattice inf OD    PLAN:    Wrote spex/CLRx  Continue Daily Wear schedule.  NO SLEEPING IN CONTACT LENSES. Clean and disinfect nightly.  exchange monthly.     Rtc 1 yr

## 2023-10-26 ENCOUNTER — PATIENT MESSAGE (OUTPATIENT)
Dept: OPTOMETRY | Facility: CLINIC | Age: 58
End: 2023-10-26
Payer: COMMERCIAL

## 2023-11-05 PROBLEM — E66.9 CLASS 1 OBESITY WITH SERIOUS COMORBIDITY AND BODY MASS INDEX (BMI) OF 33.0 TO 33.9 IN ADULT: Status: ACTIVE | Noted: 2023-11-05

## 2023-11-05 PROBLEM — E66.811 CLASS 1 OBESITY WITH SERIOUS COMORBIDITY AND BODY MASS INDEX (BMI) OF 33.0 TO 33.9 IN ADULT: Status: ACTIVE | Noted: 2023-11-05

## 2023-12-17 ENCOUNTER — OFFICE VISIT (OUTPATIENT)
Dept: URGENT CARE | Facility: CLINIC | Age: 58
End: 2023-12-17
Payer: COMMERCIAL

## 2023-12-17 VITALS
SYSTOLIC BLOOD PRESSURE: 131 MMHG | WEIGHT: 149.5 LBS | RESPIRATION RATE: 17 BRPM | TEMPERATURE: 99 F | OXYGEN SATURATION: 99 % | HEART RATE: 101 BPM | BODY MASS INDEX: 32.35 KG/M2 | DIASTOLIC BLOOD PRESSURE: 80 MMHG

## 2023-12-17 DIAGNOSIS — J10.1 INFLUENZA A: Primary | ICD-10-CM

## 2023-12-17 LAB
CTP QC/QA: YES
CTP QC/QA: YES
POC MOLECULAR INFLUENZA A AGN: POSITIVE
POC MOLECULAR INFLUENZA B AGN: NEGATIVE
SARS-COV-2 AG RESP QL IA.RAPID: NEGATIVE

## 2023-12-17 PROCEDURE — 87502 INFLUENZA DNA AMP PROBE: CPT | Mod: QW,S$GLB,, | Performed by: NURSE PRACTITIONER

## 2023-12-17 PROCEDURE — 99213 OFFICE O/P EST LOW 20 MIN: CPT | Mod: S$GLB,,, | Performed by: NURSE PRACTITIONER

## 2023-12-17 PROCEDURE — 99213 PR OFFICE/OUTPT VISIT, EST, LEVL III, 20-29 MIN: ICD-10-PCS | Mod: S$GLB,,, | Performed by: NURSE PRACTITIONER

## 2023-12-17 PROCEDURE — 87811 SARS-COV-2 COVID19 W/OPTIC: CPT | Mod: QW,S$GLB,, | Performed by: NURSE PRACTITIONER

## 2023-12-17 PROCEDURE — 87811 SARS CORONAVIRUS 2 ANTIGEN POCT, MANUAL READ: ICD-10-PCS | Mod: QW,S$GLB,, | Performed by: NURSE PRACTITIONER

## 2023-12-17 PROCEDURE — 87502 POCT INFLUENZA A/B MOLECULAR: ICD-10-PCS | Mod: QW,S$GLB,, | Performed by: NURSE PRACTITIONER

## 2023-12-17 RX ORDER — OSELTAMIVIR PHOSPHATE 75 MG/1
75 CAPSULE ORAL 2 TIMES DAILY
Qty: 10 CAPSULE | Refills: 0 | Status: SHIPPED | OUTPATIENT
Start: 2023-12-17 | End: 2023-12-22

## 2023-12-17 RX ORDER — PROMETHAZINE HYDROCHLORIDE AND DEXTROMETHORPHAN HYDROBROMIDE 6.25; 15 MG/5ML; MG/5ML
5 SYRUP ORAL EVERY 8 HOURS PRN
Qty: 118 ML | Refills: 0 | Status: SHIPPED | OUTPATIENT
Start: 2023-12-17 | End: 2023-12-27

## 2023-12-17 NOTE — PATIENT INSTRUCTIONS
Oral fluids  Rest  Steam (hot showers, hot tea)  Blow nose often  Avoid circulating air (such as ceiling fans) dries your airway  Avoid drinking cold drinks (worsens cough)  Avoid strong smells which could worsen cough (perfume, lotions, smoke...)  Therapeutic coughing to expel mucous  Sit in upright position often   Follow up with worsening symptoms

## 2023-12-17 NOTE — PROGRESS NOTES
Subjective:      Patient ID: Joanne Stallings is a 58 y.o. female.    Vitals:  weight is 67.8 kg (149 lb 7.6 oz). Her oral temperature is 99.4 °F (37.4 °C). Her blood pressure is 131/80 and her pulse is 101. Her respiration is 17 and oxygen saturation is 99%.     Chief Complaint: Cough    Pt reports symptoms of cough, body aches and chest congestion that started Friday, along with a low grade fever.    Cough  The current episode started in the past 7 days. The problem has been gradually worsening. The problem occurs constantly. The cough is Productive of sputum. Associated symptoms include headaches, nasal congestion and postnasal drip. Pertinent negatives include no sore throat. The symptoms are aggravated by other. She has tried OTC cough suppressant for the symptoms. The treatment provided mild relief.       HENT:  Positive for postnasal drip. Negative for sore throat.    Respiratory:  Positive for cough.    Neurological:  Positive for headaches.      Objective:     Physical Exam   Constitutional: She is oriented to person, place, and time. She appears well-developed. She is cooperative.  Non-toxic appearance. She does not appear ill. No distress.   HENT:   Head: Normocephalic.   Ears:   Right Ear: Hearing, tympanic membrane, external ear and ear canal normal.   Left Ear: Hearing, tympanic membrane, external ear and ear canal normal.   Nose: Congestion present. No mucosal edema, rhinorrhea or nasal deformity. No epistaxis. Right sinus exhibits no maxillary sinus tenderness and no frontal sinus tenderness. Left sinus exhibits no maxillary sinus tenderness and no frontal sinus tenderness.   Mouth/Throat: Uvula is midline and mucous membranes are normal. Mucous membranes are moist. No trismus in the jaw. Normal dentition. No uvula swelling. Posterior oropharyngeal erythema present. No oropharyngeal exudate or posterior oropharyngeal edema. Oropharynx is clear.   Eyes: Conjunctivae and lids are normal. No scleral  icterus.   Neck: Trachea normal and phonation normal. Neck supple. No edema present. No erythema present. No neck rigidity present.   Cardiovascular: Normal rate, regular rhythm, normal heart sounds and normal pulses.   Pulmonary/Chest: Effort normal and breath sounds normal. No respiratory distress. She has no decreased breath sounds. She has no rhonchi.   Abdominal: Normal appearance.   Musculoskeletal: Normal range of motion.         General: No deformity. Normal range of motion.   Neurological: She is alert and oriented to person, place, and time. She exhibits normal muscle tone. Coordination normal.   Skin: Skin is warm, dry, intact, not diaphoretic and not pale.   Psychiatric: Her speech is normal and behavior is normal. Judgment and thought content normal.   Nursing note and vitals reviewed.    Results for orders placed or performed in visit on 12/17/23   SARS Coronavirus 2 Antigen, POCT Manual Read   Result Value Ref Range    SARS Coronavirus 2 Antigen Negative Negative     Acceptable Yes    POCT Influenza A/B MOLECULAR   Result Value Ref Range    POC Molecular Influenza A Ag Positive (A) Negative, Not Reported    POC Molecular Influenza B Ag Negative Negative, Not Reported     Acceptable Yes       Assessment:     1. Influenza A        Plan:       Influenza A  -     SARS Coronavirus 2 Antigen, POCT Manual Read  -     POCT Influenza A/B MOLECULAR    Other orders  -     oseltamivir (TAMIFLU) 75 MG capsule; Take 1 capsule (75 mg total) by mouth 2 (two) times daily. for 5 days  Dispense: 10 capsule; Refill: 0  -     promethazine-dextromethorphan (PROMETHAZINE-DM) 6.25-15 mg/5 mL Syrp; Take 5 mLs by mouth every 8 (eight) hours as needed (cough).  Dispense: 118 mL; Refill: 0

## 2024-02-24 ENCOUNTER — LAB VISIT (OUTPATIENT)
Dept: LAB | Facility: HOSPITAL | Age: 59
End: 2024-02-24
Attending: INTERNAL MEDICINE
Payer: COMMERCIAL

## 2024-02-24 DIAGNOSIS — E78.49 OTHER HYPERLIPIDEMIA: ICD-10-CM

## 2024-02-24 DIAGNOSIS — E11.9 TYPE 2 DIABETES MELLITUS WITHOUT COMPLICATION, WITHOUT LONG-TERM CURRENT USE OF INSULIN: ICD-10-CM

## 2024-02-24 LAB
ALBUMIN SERPL BCP-MCNC: 3.7 G/DL (ref 3.5–5.2)
ALP SERPL-CCNC: 77 U/L (ref 55–135)
ALT SERPL W/O P-5'-P-CCNC: 15 U/L (ref 10–44)
ANION GAP SERPL CALC-SCNC: 7 MMOL/L (ref 8–16)
AST SERPL-CCNC: 18 U/L (ref 10–40)
BASOPHILS # BLD AUTO: 0.05 K/UL (ref 0–0.2)
BASOPHILS NFR BLD: 1 % (ref 0–1.9)
BILIRUB SERPL-MCNC: 0.3 MG/DL (ref 0.1–1)
BUN SERPL-MCNC: 18 MG/DL (ref 6–20)
CALCIUM SERPL-MCNC: 9.7 MG/DL (ref 8.7–10.5)
CHLORIDE SERPL-SCNC: 108 MMOL/L (ref 95–110)
CHOLEST SERPL-MCNC: 273 MG/DL (ref 120–199)
CHOLEST/HDLC SERPL: 5.1 {RATIO} (ref 2–5)
CO2 SERPL-SCNC: 24 MMOL/L (ref 23–29)
CREAT SERPL-MCNC: 1 MG/DL (ref 0.5–1.4)
DIFFERENTIAL METHOD BLD: ABNORMAL
EOSINOPHIL # BLD AUTO: 0.1 K/UL (ref 0–0.5)
EOSINOPHIL NFR BLD: 2.3 % (ref 0–8)
ERYTHROCYTE [DISTWIDTH] IN BLOOD BY AUTOMATED COUNT: 14.4 % (ref 11.5–14.5)
EST. GFR  (NO RACE VARIABLE): >60 ML/MIN/1.73 M^2
ESTIMATED AVG GLUCOSE: 126 MG/DL (ref 68–131)
GLUCOSE SERPL-MCNC: 126 MG/DL (ref 70–110)
HBA1C MFR BLD: 6 % (ref 4–5.6)
HCT VFR BLD AUTO: 42 % (ref 37–48.5)
HDLC SERPL-MCNC: 54 MG/DL (ref 40–75)
HDLC SERPL: 19.8 % (ref 20–50)
HGB BLD-MCNC: 12.9 G/DL (ref 12–16)
IMM GRANULOCYTES # BLD AUTO: 0.01 K/UL (ref 0–0.04)
IMM GRANULOCYTES NFR BLD AUTO: 0.2 % (ref 0–0.5)
LDLC SERPL CALC-MCNC: 201.6 MG/DL (ref 63–159)
LYMPHOCYTES # BLD AUTO: 1.5 K/UL (ref 1–4.8)
LYMPHOCYTES NFR BLD: 30.8 % (ref 18–48)
MCH RBC QN AUTO: 28.3 PG (ref 27–31)
MCHC RBC AUTO-ENTMCNC: 30.7 G/DL (ref 32–36)
MCV RBC AUTO: 92 FL (ref 82–98)
MONOCYTES # BLD AUTO: 0.4 K/UL (ref 0.3–1)
MONOCYTES NFR BLD: 8.5 % (ref 4–15)
NEUTROPHILS # BLD AUTO: 2.7 K/UL (ref 1.8–7.7)
NEUTROPHILS NFR BLD: 57.2 % (ref 38–73)
NONHDLC SERPL-MCNC: 219 MG/DL
NRBC BLD-RTO: 0 /100 WBC
PLATELET # BLD AUTO: 340 K/UL (ref 150–450)
PMV BLD AUTO: 10.9 FL (ref 9.2–12.9)
POTASSIUM SERPL-SCNC: 5.4 MMOL/L (ref 3.5–5.1)
PROT SERPL-MCNC: 7.5 G/DL (ref 6–8.4)
RBC # BLD AUTO: 4.56 M/UL (ref 4–5.4)
SODIUM SERPL-SCNC: 139 MMOL/L (ref 136–145)
TRIGL SERPL-MCNC: 87 MG/DL (ref 30–150)
WBC # BLD AUTO: 4.8 K/UL (ref 3.9–12.7)

## 2024-02-24 PROCEDURE — 36415 COLL VENOUS BLD VENIPUNCTURE: CPT | Mod: PO | Performed by: INTERNAL MEDICINE

## 2024-02-24 PROCEDURE — 80053 COMPREHEN METABOLIC PANEL: CPT | Performed by: INTERNAL MEDICINE

## 2024-02-24 PROCEDURE — 80061 LIPID PANEL: CPT | Performed by: INTERNAL MEDICINE

## 2024-02-24 PROCEDURE — 85025 COMPLETE CBC W/AUTO DIFF WBC: CPT | Performed by: INTERNAL MEDICINE

## 2024-02-24 PROCEDURE — 83036 HEMOGLOBIN GLYCOSYLATED A1C: CPT | Performed by: INTERNAL MEDICINE

## 2024-02-26 ENCOUNTER — OFFICE VISIT (OUTPATIENT)
Dept: INTERNAL MEDICINE | Facility: CLINIC | Age: 59
End: 2024-02-26
Payer: COMMERCIAL

## 2024-02-26 VITALS
BODY MASS INDEX: 31.86 KG/M2 | TEMPERATURE: 97 F | WEIGHT: 147.69 LBS | DIASTOLIC BLOOD PRESSURE: 70 MMHG | RESPIRATION RATE: 16 BRPM | SYSTOLIC BLOOD PRESSURE: 126 MMHG | HEIGHT: 57 IN | HEART RATE: 64 BPM

## 2024-02-26 DIAGNOSIS — E78.49 OTHER HYPERLIPIDEMIA: ICD-10-CM

## 2024-02-26 DIAGNOSIS — E11.9 TYPE 2 DIABETES MELLITUS WITHOUT COMPLICATION, WITHOUT LONG-TERM CURRENT USE OF INSULIN: ICD-10-CM

## 2024-02-26 DIAGNOSIS — H61.23 BILATERAL IMPACTED CERUMEN: ICD-10-CM

## 2024-02-26 DIAGNOSIS — R06.02 SOB (SHORTNESS OF BREATH) ON EXERTION: Primary | ICD-10-CM

## 2024-02-26 PROCEDURE — 3008F BODY MASS INDEX DOCD: CPT | Mod: CPTII,S$GLB,, | Performed by: INTERNAL MEDICINE

## 2024-02-26 PROCEDURE — 1160F RVW MEDS BY RX/DR IN RCRD: CPT | Mod: CPTII,S$GLB,, | Performed by: INTERNAL MEDICINE

## 2024-02-26 PROCEDURE — 3044F HG A1C LEVEL LT 7.0%: CPT | Mod: CPTII,S$GLB,, | Performed by: INTERNAL MEDICINE

## 2024-02-26 PROCEDURE — 99999 PR PBB SHADOW E&M-EST. PATIENT-LVL IV: CPT | Mod: PBBFAC,,, | Performed by: INTERNAL MEDICINE

## 2024-02-26 PROCEDURE — 3078F DIAST BP <80 MM HG: CPT | Mod: CPTII,S$GLB,, | Performed by: INTERNAL MEDICINE

## 2024-02-26 PROCEDURE — 3074F SYST BP LT 130 MM HG: CPT | Mod: CPTII,S$GLB,, | Performed by: INTERNAL MEDICINE

## 2024-02-26 PROCEDURE — 3072F LOW RISK FOR RETINOPATHY: CPT | Mod: CPTII,S$GLB,, | Performed by: INTERNAL MEDICINE

## 2024-02-26 PROCEDURE — 99214 OFFICE O/P EST MOD 30 MIN: CPT | Mod: S$GLB,,, | Performed by: INTERNAL MEDICINE

## 2024-02-26 PROCEDURE — 1159F MED LIST DOCD IN RCRD: CPT | Mod: CPTII,S$GLB,, | Performed by: INTERNAL MEDICINE

## 2024-02-28 ENCOUNTER — HOSPITAL ENCOUNTER (OUTPATIENT)
Dept: RADIOLOGY | Facility: HOSPITAL | Age: 59
Discharge: HOME | End: 2024-02-28
Attending: INTERNAL MEDICINE
Payer: COMMERCIAL

## 2024-02-28 DIAGNOSIS — R06.02 SOB (SHORTNESS OF BREATH) ON EXERTION: ICD-10-CM

## 2024-02-28 DIAGNOSIS — Z00.00 WELL ADULT EXAM: Primary | ICD-10-CM

## 2024-02-28 PROCEDURE — 71046 X-RAY EXAM CHEST 2 VIEWS: CPT | Mod: TC,PO

## 2024-02-28 PROCEDURE — 71046 X-RAY EXAM CHEST 2 VIEWS: CPT | Mod: 26,,, | Performed by: RADIOLOGY

## 2024-03-06 ENCOUNTER — HOSPITAL ENCOUNTER (OUTPATIENT)
Dept: CARDIOLOGY | Facility: HOSPITAL | Age: 59
Discharge: HOME | End: 2024-03-06
Attending: INTERNAL MEDICINE
Payer: COMMERCIAL

## 2024-03-06 VITALS
DIASTOLIC BLOOD PRESSURE: 80 MMHG | SYSTOLIC BLOOD PRESSURE: 140 MMHG | WEIGHT: 147 LBS | BODY MASS INDEX: 31.71 KG/M2 | HEIGHT: 57 IN | HEART RATE: 73 BPM

## 2024-03-06 DIAGNOSIS — R06.02 SOB (SHORTNESS OF BREATH) ON EXERTION: ICD-10-CM

## 2024-03-06 LAB
ASCENDING AORTA: 2.79 CM
AV INDEX (PROSTH): 0.84
AV MEAN GRADIENT: 4 MMHG
AV PEAK GRADIENT: 7 MMHG
AV VALVE AREA BY VELOCITY RATIO: 2.24 CM²
AV VALVE AREA: 1.94 CM²
AV VELOCITY RATIO: 0.98
BSA FOR ECHO PROCEDURE: 1.64 M2
CV ECHO LV RWT: 0.37 CM
DOP CALC AO PEAK VEL: 1.29 M/S
DOP CALC AO VTI: 29.32 CM
DOP CALC LVOT AREA: 2.3 CM2
DOP CALC LVOT DIAMETER: 1.71 CM
DOP CALC LVOT PEAK VEL: 1.26 M/S
DOP CALC LVOT STROKE VOLUME: 56.74 CM3
DOP CALCLVOT PEAK VEL VTI: 24.72 CM
E WAVE DECELERATION TIME: 210.67 MSEC
E/A RATIO: 0.89
E/E' RATIO: 17.64 M/S
ECHO LV POSTERIOR WALL: 0.68 CM (ref 0.6–1.1)
FRACTIONAL SHORTENING: 42 % (ref 28–44)
INTERVENTRICULAR SEPTUM: 0.6 CM (ref 0.6–1.1)
LA MAJOR: 4.75 CM
LA MINOR: 4.86 CM
LA WIDTH: 3.56 CM
LEFT ATRIUM SIZE: 3.04 CM
LEFT ATRIUM VOLUME INDEX MOD: 24.5 ML/M2
LEFT ATRIUM VOLUME INDEX: 28 ML/M2
LEFT ATRIUM VOLUME MOD: 38.67 CM3
LEFT ATRIUM VOLUME: 44.2 CM3
LEFT INTERNAL DIMENSION IN SYSTOLE: 2.13 CM (ref 2.1–4)
LEFT VENTRICLE DIASTOLIC VOLUME INDEX: 36.42 ML/M2
LEFT VENTRICLE DIASTOLIC VOLUME: 57.54 ML
LEFT VENTRICLE MASS INDEX: 38 G/M2
LEFT VENTRICLE SYSTOLIC VOLUME INDEX: 9.4 ML/M2
LEFT VENTRICLE SYSTOLIC VOLUME: 14.9 ML
LEFT VENTRICULAR INTERNAL DIMENSION IN DIASTOLE: 3.68 CM (ref 3.5–6)
LEFT VENTRICULAR MASS: 60.66 G
LV LATERAL E/E' RATIO: 16.17 M/S
LV SEPTAL E/E' RATIO: 19.4 M/S
MV A" WAVE DURATION": 11.13 MSEC
MV PEAK A VEL: 1.09 M/S
MV PEAK E VEL: 0.97 M/S
MV STENOSIS PRESSURE HALF TIME: 61.1 MS
MV VALVE AREA P 1/2 METHOD: 3.6 CM2
PULM VEIN S/D RATIO: 1.63
PV PEAK D VEL: 0.3 M/S
PV PEAK GRADIENT: 6 MMHG
PV PEAK S VEL: 0.49 M/S
PV PEAK VELOCITY: 1.22 M/S
RA MAJOR: 4.61 CM
RA PRESSURE ESTIMATED: 3 MMHG
RA WIDTH: 2.3 CM
RIGHT VENTRICULAR END-DIASTOLIC DIMENSION: 2.21 CM
SINUS: 2.54 CM
STJ: 2.26 CM
TDI LATERAL: 0.06 M/S
TDI SEPTAL: 0.05 M/S
TDI: 0.06 M/S
TRICUSPID ANNULAR PLANE SYSTOLIC EXCURSION: 2.05 CM
Z-SCORE OF LEFT VENTRICULAR DIMENSION IN END DIASTOLE: -2.07
Z-SCORE OF LEFT VENTRICULAR DIMENSION IN END SYSTOLE: -2.18

## 2024-03-06 PROCEDURE — 93306 TTE W/DOPPLER COMPLETE: CPT | Mod: PO

## 2024-03-06 PROCEDURE — 93306 TTE W/DOPPLER COMPLETE: CPT | Mod: 26,,, | Performed by: INTERNAL MEDICINE

## 2024-03-07 ENCOUNTER — TELEPHONE (OUTPATIENT)
Dept: INTERNAL MEDICINE | Facility: CLINIC | Age: 59
End: 2024-03-07
Payer: COMMERCIAL

## 2024-03-07 DIAGNOSIS — Q21.12 INTERATRIAL SEPTAL ANEURYSM WITH PFO: Primary | ICD-10-CM

## 2024-03-07 DIAGNOSIS — I25.3 INTERATRIAL SEPTAL ANEURYSM WITH PFO: Primary | ICD-10-CM

## 2024-03-07 DIAGNOSIS — I25.3 ATRIAL SEPTAL ANEURYSM: ICD-10-CM

## 2024-03-07 NOTE — TELEPHONE ENCOUNTER
----- Message from Madeleine Garcia sent at 3/7/2024  9:25 AM CST -----  Contact: [phone]  [id number]    Type: Test Results    What test was performed? Echo    Who ordered the test? Dr Phillips     When and where were the test performed? 03/06 at [facility]     Would you like response via Mychart: call back     Comments:

## 2024-03-07 NOTE — TELEPHONE ENCOUNTER
please advise what we can report on echo  Done yesterday.  She is calling for results.    Thanks haleigh

## 2024-03-08 NOTE — TELEPHONE ENCOUNTER
Echocardiogram results were reviewed with the patient.  There is an interatrial septal aneurysm.  There is normal systolic and diastolic function.  Valve function appears normal.    Will obtain cardiology consultation to address the interatrial septal aneurysm.  The patient may need periodic echocardiographic monitoring.  Questionable need for aspirin therapy.

## 2024-03-08 NOTE — PROGRESS NOTES
Occupational Therapy Daily Treatment      Visit Count: 3  Plan of Care Dates: Initial: 4/13/2017 Through: 6/8/2017  Insurance Information:   [employer]/KS12 [id number]  ID#: [id number]  Eff Date: 1/1/17 (calendar year policy)      HARD Visit Limit: 20 visits per calendar year   - Combined: No  - Used: 0  Pre auth/cert required: No      Ded: $6650 - $0 Met  Pays at: 60%  OOP: $7150 - $1.64 Met     Next Referring Provider Visit: prn     Referred by: Dr. Prasanna Huber MD  Medical Diagnosis (from order): M25.522 Left elbow pain   Treatment Diagnosis: Elbow Symptoms with Pain  Insurance: 1. [employer] 2. N/A     Date of Onset: lateral epicondylitis started 20 years ago, new L forearm pain started Sept 2016   Date of Surgery: surgery performed: percutaneous tenotomy of extensor carpi radialis brevis tendon (left 4/29/16, right 6/3/16); rehabilitation guidelines: no  Diagnosis Precautions: none     OT order for: Aggressive soft tissue massage, US, iontophoresis     Relevant co-morbidities and medications: percutaneous tenotomy of extensor carpi radialis brevis tendon (B UE 2016), L wrist fracture in 2007  Relevant Tests: None (MRI before 2016 surgeries)        SUBJECTIVE   Feeling a bit better- \"heading in the right direction\"  Current Pain: 1/10.    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   [] Only those motions that were assessed are noted.   [] Uninvolved motion within functional/normal limits.    Norm Left Right   Date   Initial Initial   ELBOW         Flexion 140-150°  135*  Pain 2/10 135   Extension 0-10°  0*  Pain 2/10 2   FOREARM         Supination 90°  85 75   Pronation 90° 83 88   WRIST         Flexion 80° 51 57   Extension 70°  Subjective:       Patient ID: Joanne Stallings is a 58 y.o. female.    Chief Complaint: Diabetes (4-5 mos wlabs) and Shortness of Breath (Had flu in December and feels like gets shortn of breath easily)    HPI  The patient presents for follow-up of medical conditions which include Type 2 diabetes mellitus, hyperlipidemia, and shortness of breath.  The patient's main complaint today is continuing shortness of breath.  She was diagnosed to have influenza on 12/17/2023.  She feels shortness of breath became prominent during that infection.  She still notes shortness of breath with climbing stairs.  No PND or orthopnea is described.  She denies experiencing any chest pain.  She has not experienced fever, chills, or night sweats.  She does not have a history of asthma.  She denies having persistent cough.  She may note slight wheezing with exertion at times.    The patient has type 2 diabetes mellitus.  She feels her diet has been stable.  No hypoglycemia has been noted.  Blood sugar levels have been stable.    She has hyperlipidemia.  She uses rosuvastatin intermittently.  She denies experiencing any muscle weakness or muscle pains.    Review of Systems   Constitutional:  Positive for chills and fatigue. Negative for activity change, appetite change, fever and unexpected weight change.   HENT:  Positive for ear pain.    Eyes:  Negative for visual disturbance.   Respiratory:  Positive for shortness of breath. Negative for cough.    Cardiovascular:  Negative for chest pain, palpitations and leg swelling.   Gastrointestinal:  Negative for abdominal pain, blood in stool and diarrhea.   Genitourinary:  Negative for dysuria, frequency, hematuria and urgency.   Neurological:  Negative for weakness, numbness and headaches.   Psychiatric/Behavioral:  Negative for sleep disturbance.             Physical Exam  Vitals and nursing note reviewed.   Constitutional:       General: She is not in acute distress.     Appearance: Normal  74*  Pain 2/10 71   [standard testing positions unless otherwise noted]  Comments: pain at end range of elbow AROM and wrist extension  * denotes pain     Strength:  with changes in elbow position: (measured in pounds of force)   [] Gross muscle strength within functional/normal limits except as noted.  [] Only muscle strength that was assessed are noted.  [] Uninvolved muscle strength within functional/normal limits.   Left Right     Initial Initial   Flexed Elbow  101*  Pain 2/10 105   Extended Elbow  45*  Pain 4/10 114*  Pain 1/10   Comment: average of 3 trial reported L elbow extended- pt stopped with pain; * denotes pain  Norms:  : Age: 40-49: male: left 94.1-123.6, right 96.1-132.9; female: left 48.5-68.7, right 56.9-77.3       Strength: (out of 5)  [] Gross muscle strength within functional/normal limits except as noted.  [] Only muscle strength that was assessed are noted.  [] Uninvolved muscle strength within functional/normal limits.    Norm Left Right   Date   Initial Initial   ELBOW         Flexion 140-150°  5 5   Extension 0-10°  5 5   FOREARM         Supination 90°  5*  Pain 1/10 5   Pronation 90° 5*  Pain 2/10 5*  Pain 1/10   WRIST         Flexion 80° 5 5   Extension 70° 5 5*  Pain 2/10   Comments: pain at dorsal L forearm; * denotes pain       Treatment   Therapeutic Exercise:   Active-> passive wrist stretches   Eccentric wrist 2#  [place] with tennis ball, elbow flexed     Manual Therapy:   STM/IASTM to dorsal forearm  Scar massage     Modalities:  Patient has been made aware of potential contraindications and possible risks associated with the use of the following modalities and has agreed.  Iontophoresis (45361):   Location: L dorsal forearm; Active pad: negative pole; 1.0 ml dexamethasone sodium phosphate, 4mg/ml.  Inactive pad: positive pole; 1 ml sterile saline. IontoPatch STAT: 80mA-min dose strength with 4 hour average wear time. .  Patient was educated to remove electrode  appearance. She is well-developed.      Comments: The patient has lost 5 lb since 10/02/2023.   HENT:      Head: Normocephalic and atraumatic.      Right Ear: There is impacted cerumen.      Left Ear: There is impacted cerumen.   Eyes:      General: No scleral icterus.     Extraocular Movements: Extraocular movements intact.      Conjunctiva/sclera: Conjunctivae normal.   Neck:      Thyroid: No thyromegaly.      Vascular: No JVD.   Cardiovascular:      Rate and Rhythm: Normal rate and regular rhythm.      Heart sounds: Normal heart sounds. No murmur heard.     No friction rub. No gallop.   Pulmonary:      Effort: Pulmonary effort is normal. No respiratory distress.      Breath sounds: Normal breath sounds. No wheezing or rales.   Abdominal:      General: Bowel sounds are normal.      Palpations: Abdomen is soft. There is no mass.      Tenderness: There is no abdominal tenderness.   Musculoskeletal:         General: No tenderness. Normal range of motion.      Cervical back: Normal range of motion and neck supple.   Lymphadenopathy:      Cervical: No cervical adenopathy.   Skin:     General: Skin is warm and dry.      Findings: No rash.      Comments: No foot lesions are present.   Neurological:      Mental Status: She is alert and oriented to person, place, and time.      Cranial Nerves: No cranial nerve deficit.      Comments: Sensory exam is intact in both feet on monofilament testing.   Psychiatric:         Mood and Affect: Mood normal.         Behavior: Behavior normal.         Protective Sensation (w/ 10 gram monofilament):  Right: Intact  Left: Intact    Visual Inspection:  Normal -  Bilateral    Pedal Pulses:   Right: Present  Left: Present    Posterior Tibialis Pulses:   Right:Present  Left: Present    Lab Visit on 02/24/2024   Component Date Value Ref Range Status    Sodium 02/24/2024 139  136 - 145 mmol/L Final    Potassium 02/24/2024 5.4 (H)  3.5 - 5.1 mmol/L Final    *No Visible Hemolysis    Chloride  02/24/2024 108  95 - 110 mmol/L Final    CO2 02/24/2024 24  23 - 29 mmol/L Final    Glucose 02/24/2024 126 (H)  70 - 110 mg/dL Final    BUN 02/24/2024 18  6 - 20 mg/dL Final    Creatinine 02/24/2024 1.0  0.5 - 1.4 mg/dL Final    Calcium 02/24/2024 9.7  8.7 - 10.5 mg/dL Final    Total Protein 02/24/2024 7.5  6.0 - 8.4 g/dL Final    Albumin 02/24/2024 3.7  3.5 - 5.2 g/dL Final    Total Bilirubin 02/24/2024 0.3  0.1 - 1.0 mg/dL Final    Comment: For infants and newborns, interpretation of results should be based  on gestational age, weight and in agreement with clinical  observations.    Premature Infant recommended reference ranges:  Up to 24 hours.............<8.0 mg/dL  Up to 48 hours............<12.0 mg/dL  3-5 days..................<15.0 mg/dL  6-29 days.................<15.0 mg/dL      Alkaline Phosphatase 02/24/2024 77  55 - 135 U/L Final    AST 02/24/2024 18  10 - 40 U/L Final    ALT 02/24/2024 15  10 - 44 U/L Final    eGFR 02/24/2024 >60.0  >60 mL/min/1.73 m^2 Final    Anion Gap 02/24/2024 7 (L)  8 - 16 mmol/L Final    Cholesterol 02/24/2024 273 (H)  120 - 199 mg/dL Final    Comment: The National Cholesterol Education Program (NCEP) has set the  following guidelines (reference ranges) for Cholesterol:  Optimal.....................<200 mg/dL  Borderline High.............200-239 mg/dL  High........................> or = 240 mg/dL      Triglycerides 02/24/2024 87  30 - 150 mg/dL Final    Comment: The National Cholesterol Education Program (NCEP) has set the  following guidelines (reference values) for triglycerides:  Normal......................<150 mg/dL  Borderline High.............150-199 mg/dL  High........................200-499 mg/dL      HDL 02/24/2024 54  40 - 75 mg/dL Final    Comment: The National Cholesterol Education Program (NCEP) has set the  following guidelines (reference values) for HDL Cholesterol:  Low...............<40 mg/dL  Optimal...........>60 mg/dL      LDL Cholesterol 02/24/2024 201.6  from skin 4 hours after treatment and to monitor any skin reaction.    Moist Heat (33346):  Location: L elbow/forearm; Position: sitting; Duration: 10 minutes Temperature: 160° F  Paraffin (04371):  Prepared area per protocol.  5-6 dips.  Hand was wrapped in a plastic bag.  Position: sitting; Duration: 10 minutes; Temperature: 130° F  Ultrasound (99502):  Location: L dorsal forearm; Position: sitting; Duration: 10 minutes.  Intensity: 1.0 W/cm2, Duty Cycle: 100% duty cycle; Frequency: 3 Mhz.   Modality treatment resulted in decreased pain.  Patient reports no adverse reaction to treatment.     Current Home Program (not performed this date except as noted above):   Moist heat, massage to dorsal forearm and scar   Active-> passive wrist stretches      ASSESSMENT   Progressing towards goals. Starting to feel better. Tolerated progression of exercises w/o pain.   Pain after treatment: 0/10  Result of above outlined education: Verbalizes understanding and Demonstrates understanding    Goals:       To be obtained by end of this plan of care:  1. Patient independent with modified and progressed home exercise program.  2. Patient will decrease involved elbow pain to 0/10 to aid in returning to work/school, age appropriate activities.   3. Patient will be able to sleep 6 hours without disruption from pain.   4. Achieve  strength of 80 pounds with elbow extended with minimal pain for resumption of light grocery bag lift,  steering wheel, perform light home/yard maintenance tasks.  5. Patient will increase involved wrist strength to 5/5 with minimal pain to aid in age appropriate activities.  6. DASH: Patient will complete form to reflect an improved score from initial score of 25 to less than or equal to 10 (scored 0-100; a higher score indicates greater disability) to indicate pt reported improvement in function/disability/impairment (minimal detectable change: 15 points).    PLAN   paraffin, IASTM, ultrasound, ionto       THERAPY DAILY BILLING   Primary Insurance: [employer]  Secondary Insurance: N/A    Evaluation Procedures:  No evaluation codes were used on this date of service    Timed Procedures:   Iontophoresis, 5 minutes   Manual Therapy, 5 minutes  Therapeutic Exercise, 10 minutes  Ultrasound, 10 minutes    Untimed Procedures:  Paraffin Bath    Total Treatment Time: 45 minutes    The referring provider's electronic or written signature on the evaluation authorizes the therapy plan of care.     (H)  63.0 - 159.0 mg/dL Final    Comment: The National Cholesterol Education Program (NCEP) has set the  following guidelines (reference values) for LDL Cholesterol:  Optimal.......................<130 mg/dL  Borderline High...............130-159 mg/dL  High..........................160-189 mg/dL  Very High.....................>190 mg/dL      HDL/Cholesterol Ratio 02/24/2024 19.8 (L)  20.0 - 50.0 % Final    Total Cholesterol/HDL Ratio 02/24/2024 5.1 (H)  2.0 - 5.0 Final    Non-HDL Cholesterol 02/24/2024 219  mg/dL Final    Comment: Risk category and Non-HDL cholesterol goals:  Coronary heart disease (CHD)or equivalent (10-year risk of CHD >20%):  Non-HDL cholesterol goal     <130 mg/dL  Two or more CHD risk factors and 10-year risk of CHD <= 20%:  Non-HDL cholesterol goal     <160 mg/dL  0 to 1 CHD risk factor:  Non-HDL cholesterol goal     <190 mg/dL      WBC 02/24/2024 4.80  3.90 - 12.70 K/uL Final    RBC 02/24/2024 4.56  4.00 - 5.40 M/uL Final    Hemoglobin 02/24/2024 12.9  12.0 - 16.0 g/dL Final    Hematocrit 02/24/2024 42.0  37.0 - 48.5 % Final    MCV 02/24/2024 92  82 - 98 fL Final    MCH 02/24/2024 28.3  27.0 - 31.0 pg Final    MCHC 02/24/2024 30.7 (L)  32.0 - 36.0 g/dL Final    RDW 02/24/2024 14.4  11.5 - 14.5 % Final    Platelets 02/24/2024 340  150 - 450 K/uL Final    MPV 02/24/2024 10.9  9.2 - 12.9 fL Final    Immature Granulocytes 02/24/2024 0.2  0.0 - 0.5 % Final    Gran # (ANC) 02/24/2024 2.7  1.8 - 7.7 K/uL Final    Immature Grans (Abs) 02/24/2024 0.01  0.00 - 0.04 K/uL Final    Comment: Mild elevation in immature granulocytes is non specific and   can be seen in a variety of conditions including stress response,   acute inflammation, trauma and pregnancy. Correlation with other   laboratory and clinical findings is essential.      Lymph # 02/24/2024 1.5  1.0 - 4.8 K/uL Final    Mono # 02/24/2024 0.4  0.3 - 1.0 K/uL Final    Eos # 02/24/2024 0.1  0.0 - 0.5 K/uL Final    Baso # 02/24/2024 0.05  0.00 -  0.20 K/uL Final    nRBC 02/24/2024 0  0 /100 WBC Final    Gran % 02/24/2024 57.2  38.0 - 73.0 % Final    Lymph % 02/24/2024 30.8  18.0 - 48.0 % Final    Mono % 02/24/2024 8.5  4.0 - 15.0 % Final    Eosinophil % 02/24/2024 2.3  0.0 - 8.0 % Final    Basophil % 02/24/2024 1.0  0.0 - 1.9 % Final    Differential Method 02/24/2024 Automated   Final    Hemoglobin A1C 02/24/2024 6.0 (H)  4.0 - 5.6 % Final    Comment: ADA Screening Guidelines:  5.7-6.4%  Consistent with prediabetes  >or=6.5%  Consistent with diabetes    High levels of fetal hemoglobin interfere with the HbA1C  assay. Heterozygous hemoglobin variants (HbS, HgC, etc)do  not significantly interfere with this assay.   However, presence of multiple variants may affect accuracy.      Estimated Avg Glucose 02/24/2024 126  68 - 131 mg/dL Final   Office Visit on 12/17/2023   Component Date Value Ref Range Status    SARS Coronavirus 2 Antigen 12/17/2023 Negative  Negative Final     Acceptable 12/17/2023 Yes   Final    POC Molecular Influenza A Ag 12/17/2023 Positive (A)  Negative, Not Reported Final    POC Molecular Influenza B Ag 12/17/2023 Negative  Negative, Not Reported Final     Acceptable 12/17/2023 Yes   Final       Assessment & Plan:      Joanne was seen today for diabetes and shortness of breath.  A chest x-ray, echocardiogram, and pulmonary function tests will be obtained for further assessment of exertional dyspnea.  Current medications will be continued for treatment of hyperlipidemia and diabetes.  The patient has been encouraged to be more compliant with rosuvastatin therapy.    ENT consultation will be obtained for treatment of impacted cerumen.    Diagnoses and all orders for this visit:    SOB (shortness of breath) on exertion  -     X-Ray Chest PA And Lateral; Future  -     Echo Saline Bubble? No; Future  -     Complete PFT w/ bronchodilator; Future    Type 2 diabetes mellitus without complication, without long-term  current use of insulin    Other hyperlipidemia    Bilateral impacted cerumen  -     Ambulatory referral/consult to ENT; Future         Follow up in about 4 months (around 6/26/2024).     Alonso Phillips MD

## 2024-03-13 ENCOUNTER — OFFICE VISIT (OUTPATIENT)
Dept: CARDIOLOGY | Facility: CLINIC | Age: 59
End: 2024-03-13
Payer: COMMERCIAL

## 2024-03-13 VITALS
HEIGHT: 57 IN | OXYGEN SATURATION: 98 % | HEART RATE: 70 BPM | DIASTOLIC BLOOD PRESSURE: 75 MMHG | SYSTOLIC BLOOD PRESSURE: 132 MMHG | WEIGHT: 148.5 LBS | BODY MASS INDEX: 32.04 KG/M2

## 2024-03-13 DIAGNOSIS — R06.09 DYSPNEA ON EXERTION: Primary | ICD-10-CM

## 2024-03-13 DIAGNOSIS — E11.9 TYPE 2 DIABETES MELLITUS WITHOUT COMPLICATION, WITHOUT LONG-TERM CURRENT USE OF INSULIN: ICD-10-CM

## 2024-03-13 DIAGNOSIS — I25.3 ATRIAL SEPTAL ANEURYSM: ICD-10-CM

## 2024-03-13 DIAGNOSIS — R07.9 CHEST PAIN OF UNCERTAIN ETIOLOGY: ICD-10-CM

## 2024-03-13 DIAGNOSIS — E78.00 PURE HYPERCHOLESTEROLEMIA: ICD-10-CM

## 2024-03-13 PROCEDURE — 3075F SYST BP GE 130 - 139MM HG: CPT | Mod: CPTII,S$GLB,, | Performed by: INTERNAL MEDICINE

## 2024-03-13 PROCEDURE — 3008F BODY MASS INDEX DOCD: CPT | Mod: CPTII,S$GLB,, | Performed by: INTERNAL MEDICINE

## 2024-03-13 PROCEDURE — 1160F RVW MEDS BY RX/DR IN RCRD: CPT | Mod: CPTII,S$GLB,, | Performed by: INTERNAL MEDICINE

## 2024-03-13 PROCEDURE — 3078F DIAST BP <80 MM HG: CPT | Mod: CPTII,S$GLB,, | Performed by: INTERNAL MEDICINE

## 2024-03-13 PROCEDURE — 93000 ELECTROCARDIOGRAM COMPLETE: CPT | Mod: S$GLB,,, | Performed by: INTERNAL MEDICINE

## 2024-03-13 PROCEDURE — 1159F MED LIST DOCD IN RCRD: CPT | Mod: CPTII,S$GLB,, | Performed by: INTERNAL MEDICINE

## 2024-03-13 PROCEDURE — 99205 OFFICE O/P NEW HI 60 MIN: CPT | Mod: 25,S$GLB,, | Performed by: INTERNAL MEDICINE

## 2024-03-13 PROCEDURE — 99999 PR PBB SHADOW E&M-EST. PATIENT-LVL IV: CPT | Mod: PBBFAC,,, | Performed by: INTERNAL MEDICINE

## 2024-03-13 PROCEDURE — 3044F HG A1C LEVEL LT 7.0%: CPT | Mod: CPTII,S$GLB,, | Performed by: INTERNAL MEDICINE

## 2024-03-13 PROCEDURE — 3072F LOW RISK FOR RETINOPATHY: CPT | Mod: CPTII,S$GLB,, | Performed by: INTERNAL MEDICINE

## 2024-03-13 NOTE — PROGRESS NOTES
"  Subjective:      Patient ID: Joanne Stallings is a 58 y.o. female.    Chief Complaint: Hyperlipidemia, Atrial Septal Defect (Atrial Septal Aneurysm), Shortness of Breath, and Palpitations    HPI:  Pt had an echocardiogram performed due to symptoms of shortness of breath on exertion which began after having the flu in 2023.  The shortness of breath is improving but pt does not feel like she is back to normal.  There is no hx of asthma.    Pt feels short of breath when climbing stairs at work.  Pt is a  at an elementary school.  Pt also feels short of breath when taking in her groceries or when taking a long walk.    Review of Systems   Cardiovascular:  Positive for chest pain (Pt has occasional chest pains when idle--a shooting pain behind left breast when sitting.  "It starts out as a sharp pain which then fades away"), dyspnea on exertion and irregular heartbeat (Pt has occasional irregular heart beat lasting a second occurring about once every 3 weeks.). Negative for claudication, leg swelling, near-syncope, orthopnea, palpitations and syncope.        Past Medical History:   Diagnosis Date    Chronic constipation     Depression     Diabetes mellitus     Hyperlipidemia         Past Surgical History:   Procedure Laterality Date     SECTION      COLONOSCOPY N/A 2016    Procedure: COLONOSCOPY;  Surgeon: Angel Sawyer MD;  Location: [facility] ([address] [place] [address]);  Service: Endoscopy;  Laterality: N/A;    HYSTERECTOMY      ALLIE, ovarires and cervix  remain (fibroids)    SURGICAL REMOVAL OF MASS OF AXILLA Right 10/20/2020    Procedure: EXCISION, MASS, AXILLARY;  Surgeon: Ted Baumann MD;  Location: [facility];  Service: General;  Laterality: Right;       Family History   Problem Relation Age of Onset    Diabetes Mother     Hypertension Mother     Hyperlipidemia Mother     Glaucoma Mother     Kidney failure Mother     Lung cancer Father         lung    Arthritis Father     Cancer " Father     No Known Problems Sister     No Known Problems Brother     No Known Problems Maternal Aunt     No Known Problems Maternal Uncle     No Known Problems Paternal Aunt     No Known Problems Paternal Uncle     Glaucoma Maternal Grandmother     No Known Problems Maternal Grandfather     No Known Problems Paternal Grandmother     No Known Problems Paternal Grandfather     Breast cancer Neg Hx     Colon cancer Neg Hx     Ovarian cancer Neg Hx     Cataracts Neg Hx     Macular degeneration Neg Hx     Retinal detachment Neg Hx     Strabismus Neg Hx     Blindness Neg Hx     Amblyopia Neg Hx        Social History     Socioeconomic History    Marital status: Single   Tobacco Use    Smoking status: Never     Passive exposure: Never    Smokeless tobacco: Never   Substance and Sexual Activity    Alcohol use: Yes     Alcohol/week: 1.0 standard drink of alcohol     Types: 1 Glasses of wine per week     Comment: occasional use only/ not weekly    Drug use: No    Sexual activity: Yes     Partners: Male     Birth control/protection: None   Social History Narrative    Work:      Family: Patient is  2 children.     Habits: Nonsmoker, occasional etoh    Diet/Exercise:     Other:         Social Determinants of Health     Financial Resource Strain: Low Risk  (9/30/2021)    Overall Financial Resource Strain (CARDIA)     Difficulty of Paying Living Expenses: Not hard at all   Food Insecurity: No Food Insecurity (9/30/2021)    Hunger Vital Sign     Worried About Running Out of Food in the Last Year: Never true     Ran Out of Food in the Last Year: Never true   Transportation Needs: No Transportation Needs (9/30/2021)    PRAPARE - Transportation     Lack of Transportation (Medical): No     Lack of Transportation (Non-Medical): No   Physical Activity: Inactive (12/14/2021)    Exercise Vital Sign     Days of Exercise per Week: 0 days     Minutes of Exercise per Session: 0 min   Stress: No Stress Concern Present (12/14/2021)     [employer] - Occupational Stress Questionnaire     Feeling of Stress : Only a little   Recent Concern: Stress - Stress Concern Present (9/30/2021)    [employer] - Occupational Stress Questionnaire     Feeling of Stress : To some extent   Social Connections: Unknown (12/14/2021)    Social Connection and Isolation Panel [NHANES]     Frequency of Communication with Friends and Family: More than three times a week     Frequency of Social Gatherings with Friends and Family: Once a week     Active Member of Clubs or Organizations: No     Attends Club or Organization Meetings: Never     Marital Status: Living with partner   Housing Stability: Low Risk  (12/14/2021)    Housing Stability Vital Sign     Unable to Pay for Housing in the Last Year: No     Number of Places Lived in the Last Year: 1     Unstable Housing in the Last Year: No       Current Outpatient Medications on File Prior to Visit   Medication Sig Dispense Refill    blood sugar diagnostic Strp To check BG once daily, to use with insurance preferred meter 100 strip 3    blood-glucose meter kit To check BG1 times daily, to use with insurance preferred meter 1 each 3    estradioL (ESTRACE) 0.01 % (0.1 mg/gram) vaginal cream Place 1 gram vaginally nightly x 2 weeks then 1 gram twice weekly 42.5 g 2    lancets Misc 1 each by Misc.(Non-Drug; Combo Route) route once daily. 50 each 11    rosuvastatin (CRESTOR) 20 MG tablet Take 1 tablet (20 mg total) by mouth every evening. 90 tablet 3    semaglutide (OZEMPIC) 1 mg/dose (4 mg/3 mL) Inject 1 mg into the skin every 7 days. 3 mL 11    TRUE METRIX GLUCOSE METER Misc USE TO CHECK BLOOD GLUCOSE ONE TIME DAILY      TRUEPLUS LANCETS 30 gauge Misc Use      [DISCONTINUED] estradioL (ESTRING) 2 mg (7.5 mcg /24 hour) vaginal ring Place 2 mg vaginally every 3 (three) months. 1 each 3     No current facility-administered medications on file prior to visit.       Review of  "patient's allergies indicates:   Allergen Reactions    Iodine Hives and Swelling    Shellfish containing products Hives and Swelling     Objective:     Vitals:    03/13/24 0914   BP: 132/75   BP Location: Left arm   Patient Position: Sitting   BP Method: Large (Automatic)   Pulse: 70   SpO2: 98%   Weight: 67.4 kg (148 lb 7.7 oz)   Height: 4' 9" (1.448 m)        Physical Exam  Constitutional:       Appearance: She is well-developed.   Eyes:      General: No scleral icterus.  Neck:      Vascular: No carotid bruit or JVD.   Cardiovascular:      Rate and Rhythm: Normal rate and regular rhythm.      Pulses:           Posterior tibial pulses are 2+ on the right side and 2+ on the left side.      Heart sounds: No murmur heard.     No gallop.   Pulmonary:      Breath sounds: Normal breath sounds.   Abdominal:      General: Bowel sounds are normal. There is no abdominal bruit.      Palpations: Abdomen is soft. There is no shifting dullness, fluid wave, hepatomegaly, splenomegaly, mass or pulsatile mass.      Tenderness: There is no abdominal tenderness.   Musculoskeletal:      Right lower leg: No edema.      Left lower leg: No edema.   Skin:     General: Skin is warm and dry.   Neurological:      Mental Status: She is alert and oriented to person, place, and time.   Psychiatric:         Behavior: Behavior normal.         Thought Content: Thought content normal.         Judgment: Judgment normal.              ECG today reviewed by me: SARAH SALTER    Hospital Outpatient Visit on 03/06/2024   Component Date Value Ref Range Status    RA Width 03/06/2024 2.30  cm Final    LA volume (mod) 03/06/2024 38.67  cm3 Final    Left Atrium Major Axis 03/06/2024 4.75  cm Final    Left Atrium Minor Axis 03/06/2024 4.86  cm Final    RA Major Axis 03/06/2024 4.61  cm Final    LV Diastolic Volume 03/06/2024 57.54  mL Final    LV Systolic Volume 03/06/2024 14.90  mL Final    PV Peak D Gulshan 03/06/2024 0.30  m/s Final    PV Peak S Gulshan 03/06/2024 0.49  " "m/s Final    MV Peak A Gulshan 03/06/2024 1.09  m/s Final    MV stenosis pressure 1/2 time 03/06/2024 61.10  ms Final    MV Peak E Gulshan 03/06/2024 0.97  m/s Final    Ao VTI 03/06/2024 29.32  cm Final    Ao peak gulshan 03/06/2024 1.29  m/s Final    LVOT peak VTI 03/06/2024 24.72  cm Final    LVOT peak gulshan 03/06/2024 1.26  m/s Final    LVOT diameter 03/06/2024 1.71  cm Final    MV "A" wave duration 03/06/2024 11.13  msec Final    E wave deceleration time 03/06/2024 210.67  msec Final    AV mean gradient 03/06/2024 4  mmHg Final    TAPSE 03/06/2024 2.05  cm Final    RVDD 03/06/2024 2.21  cm Final    LA size 03/06/2024 3.04  cm Final    Ascending aorta 03/06/2024 2.79  cm Final    STJ 03/06/2024 2.26  cm Final    Sinus 03/06/2024 2.54  cm Final    LVIDs 03/06/2024 2.13  2.1 - 4.0 cm Final    Posterior Wall 03/06/2024 0.68  0.6 - 1.1 cm Final    IVS 03/06/2024 0.60  0.6 - 1.1 cm Final    LVIDd 03/06/2024 3.68  3.5 - 6.0 cm Final    PV PEAK VELOCITY 03/06/2024 1.22  m/s Final    TDI LATERAL 03/06/2024 0.06  m/s Final    LA WIDTH 03/06/2024 3.56  cm Final    TDI SEPTAL 03/06/2024 0.05  m/s Final    LV LATERAL E/E' RATIO 03/06/2024 16.17  m/s Final    LV SEPTAL E/E' RATIO 03/06/2024 19.40  m/s Final    FS 03/06/2024 42  28 - 44 % Final    LA volume 03/06/2024 44.20  cm3 Final    LV mass 03/06/2024 60.66  g Final    Left Ventricle Relative Wall Thick* 03/06/2024 0.37  cm Final    AV valve area 03/06/2024 1.94  cm² Final    AV Velocity Ratio 03/06/2024 0.98   Final    AV index (prosthetic) 03/06/2024 0.84   Final    MV valve area p 1/2 method 03/06/2024 3.60  cm2 Final    PV peak gradient 03/06/2024 6  mmHg Final    E/A ratio 03/06/2024 0.89   Final    Mean e' 03/06/2024 0.06  m/s Final    Pulm vein S/D ratio 03/06/2024 1.63   Final    LVOT area 03/06/2024 2.3  cm2 Final    LVOT stroke volume 03/06/2024 56.74  cm3 Final    AV peak gradient 03/06/2024 7  mmHg Final    E/E' ratio 03/06/2024 17.64  m/s Final    TANGELA by Velocity Ratio " 03/06/2024 2.24  cm² Final    BSA 03/06/2024 1.64  m2 Final    LV Systolic Volume Index 03/06/2024 9.4  mL/m2 Final    LV Diastolic Volume Index 03/06/2024 36.42  mL/m2 Final    LV Mass Index 03/06/2024 38  g/m2 Final    LA Volume Index 03/06/2024 28.0  mL/m2 Final    LA Volume Index (Mod) 03/06/2024 24.5  mL/m2 Final    ZLVIDS 03/06/2024 -2.18   Final    ZLVIDD 03/06/2024 -2.07   Final    Est. RA pres 03/06/2024 3  mmHg Final   Lab Visit on 02/24/2024   Component Date Value Ref Range Status    Sodium 02/24/2024 139  136 - 145 mmol/L Final    Potassium 02/24/2024 5.4 (H)  3.5 - 5.1 mmol/L Final    Chloride 02/24/2024 108  95 - 110 mmol/L Final    CO2 02/24/2024 24  23 - 29 mmol/L Final    Glucose 02/24/2024 126 (H)  70 - 110 mg/dL Final    BUN 02/24/2024 18  6 - 20 mg/dL Final    Creatinine 02/24/2024 1.0  0.5 - 1.4 mg/dL Final    Calcium 02/24/2024 9.7  8.7 - 10.5 mg/dL Final    Total Protein 02/24/2024 7.5  6.0 - 8.4 g/dL Final    Albumin 02/24/2024 3.7  3.5 - 5.2 g/dL Final    Total Bilirubin 02/24/2024 0.3  0.1 - 1.0 mg/dL Final    Alkaline Phosphatase 02/24/2024 77  55 - 135 U/L Final    AST 02/24/2024 18  10 - 40 U/L Final    ALT 02/24/2024 15  10 - 44 U/L Final    eGFR 02/24/2024 >60.0  >60 mL/min/1.73 m^2 Final    Anion Gap 02/24/2024 7 (L)  8 - 16 mmol/L Final    Cholesterol 02/24/2024 273 (H)  120 - 199 mg/dL Final    Triglycerides 02/24/2024 87  30 - 150 mg/dL Final    HDL 02/24/2024 54  40 - 75 mg/dL Final    LDL Cholesterol 02/24/2024 201.6 (H)  63.0 - 159.0 mg/dL Final    HDL/Cholesterol Ratio 02/24/2024 19.8 (L)  20.0 - 50.0 % Final    Total Cholesterol/HDL Ratio 02/24/2024 5.1 (H)  2.0 - 5.0 Final    Non-HDL Cholesterol 02/24/2024 219  mg/dL Final    WBC 02/24/2024 4.80  3.90 - 12.70 K/uL Final    RBC 02/24/2024 4.56  4.00 - 5.40 M/uL Final    Hemoglobin 02/24/2024 12.9  12.0 - 16.0 g/dL Final    Hematocrit 02/24/2024 42.0  37.0 - 48.5 % Final    MCV 02/24/2024 92  82 - 98 fL Final    MCH 02/24/2024  28.3  27.0 - 31.0 pg Final    MCHC 02/24/2024 30.7 (L)  32.0 - 36.0 g/dL Final    RDW 02/24/2024 14.4  11.5 - 14.5 % Final    Platelets 02/24/2024 340  150 - 450 K/uL Final    MPV 02/24/2024 10.9  9.2 - 12.9 fL Final    Immature Granulocytes 02/24/2024 0.2  0.0 - 0.5 % Final    Gran # (ANC) 02/24/2024 2.7  1.8 - 7.7 K/uL Final    Immature Grans (Abs) 02/24/2024 0.01  0.00 - 0.04 K/uL Final    Lymph # 02/24/2024 1.5  1.0 - 4.8 K/uL Final    Mono # 02/24/2024 0.4  0.3 - 1.0 K/uL Final    Eos # 02/24/2024 0.1  0.0 - 0.5 K/uL Final    Baso # 02/24/2024 0.05  0.00 - 0.20 K/uL Final    nRBC 02/24/2024 0  0 /100 WBC Final    Gran % 02/24/2024 57.2  38.0 - 73.0 % Final    Lymph % 02/24/2024 30.8  18.0 - 48.0 % Final    Mono % 02/24/2024 8.5  4.0 - 15.0 % Final    Eosinophil % 02/24/2024 2.3  0.0 - 8.0 % Final    Basophil % 02/24/2024 1.0  0.0 - 1.9 % Final    Differential Method 02/24/2024 Automated   Final    Hemoglobin A1C 02/24/2024 6.0 (H)  4.0 - 5.6 % Final    Estimated Avg Glucose 02/24/2024 126  68 - 131 mg/dL Final   Office Visit on 12/17/2023   Component Date Value Ref Range Status    SARS Coronavirus 2 Antigen 12/17/2023 Negative  Negative Final     Acceptable 12/17/2023 Yes   Final    POC Molecular Influenza A Ag 12/17/2023 Positive (A)  Negative, Not Reported Final    POC Molecular Influenza B Ag 12/17/2023 Negative  Negative, Not Reported Final     Acceptable 12/17/2023 Yes   Final   Office Visit on 09/28/2023   Component Date Value Ref Range Status    Trichomonas vaginalis 09/28/2023 Negative  Negative Final    Candida sp 09/28/2023 Negative  Negative Final    Gi glabrata DNA 09/28/2023 Negative  Negative Final    Gi krusei DNA 09/28/2023 Negative  Negative Final    Bacterial vaginosis DNA 09/28/2023 Negative  Negative Final    Chlamydia, Amplified DNA 09/28/2023 Not Detected  Not Detected Final    N gonorrhoeae, amplified DNA 09/28/2023 Not Detected  Not Detected Final  "  (  Transthoracic echo (TTE) complete    Height:  4' 9" (1.448 m)   Weight:  67 kg (147 lb 11.3 oz)   Blood Pressure:  140/80    Date of Study:  3/6/24   Ordering Provider:  Alonso Phillips MD   Clinical Indications:  SOB (shortness of breath) on exertion [R06.02 (ICD-10-CM)]       Interpreting Physicians  Performing Staff   Bridget Garzon MD Tech:  Lia Bridges PCT        Reason for Exam  Priority: Routine  Dx: SOB (shortness of breath) on exertion [R06.02 (ICD-10-CM)]     View Images Vital Vitrea     Show images for Echo Saline Bubble? No  Summary         Left Ventricle: There is normal systolic function with a visually estimated ejection fraction of 60 - 65%. There is normal diastolic function.    Right Ventricle: Normal right ventricular cavity size. Wall thickness is normal. Right ventricle wall motion  is normal. Systolic function is normal.    Left Atrium: There is an aneurysm along the interatrial septum.    Aortic Valve: The aortic valve is a trileaflet valve.    IVC/SVC: Normal venous pressure at 3 mmHg.    Pericardium: There is a trivial posterior effusion.   Accession #: [id number]  Exercise stress echo    Height:  4' 9" (1.448 m)   Weight:  71.8 kg (158 lb 4.6 oz)   Blood Pressure:  126/62    Date of Study:  12/22/15   Ordering Provider:  Alonso Phillips MD   Clinical Indications:  Hyperlipidemia [E78.5 (ICD-10-CM)], SOB (shortness of breath) on exertion [R06.02 (ICD-10-CM)]       Interpreting Physicians  Performing Staff   Result is not signed. No performing staff assigned to study.     Reason for Exam  Priority: Routine  Dx: Hyperlipidemia [E78.5 (ICD-10-CM)]; SOB (shortness of breath) on exertion [R06.02 (ICD-10-CM)]      Contains abnormal data Exercise stress echo  Order: [id number]  Status: Final result     Visible to patient: Yes (seen)     Next appt: 05/13/2024 at 02:40 PM in Otolaryngology (Ryland Warren MD)     Dx: Hyperlipidemia; SOB (shortness of lila...     0 Result " Notes    1 Patient Communication       Component Ref Range & Units 8 yr ago   EF + QEF 55 - 65 60    Diastolic Dysfunction  Yes Abnormal     Mitral Valve Mobility  NORMAL    Resulting Agency  [facility]              Narrative  Performed by: [facility]  PRE-TEST DATA   EKG: Resting electrocardiogram reveals normal sinus rhythm at a rate of 76 bpm.     TEST DESCRIPTION   The patient exercised for 6.53 minutes on a High Ramp protocol, corresponding to a functional capacity of 11 estimated METS, achieving a peak heart rate of 146 bpm, which is 90% of the age predicted maximum heart rate. The patient discontinued exercise   secondary to fatigue.     There were no significant electrocardiographic changes throughout the protocol suggesting ischemia.     EKG Conclusions:     1. The EKG portion of this study is negative for ischemia at a moderate workload, and peak heart rate of 146 bpm (90% of predicted).   2. Exercise capacity is average.   3. Blood pressure response to exercise was normal (Presenting BP: 106/57 Peak BP: 144/58).   4. No significant arrhythmias were present.   5. There were no symptoms of chest discomfort or significant dyspnea throughout the protocol.   6. The Duke treadmill score was 7 suggesting a low probability for future cardiovascular events.     Echocardiographic Description:       Aorta: The aortic root is normal in size, measuring 2.2 cm at sinotubular junction and 2.4 cm at Sinuses of Valsalva. The proximal ascending aorta is normal in size, measuring 2.6 cm across.     Left Atrium: The left atrial volume index is normal, measuring 27.82 cc/m2.     Left Ventricle: The left ventricle is normal in size, with an end-diastolic diameter of 3.9 cm, and an end-systolic diameter of 2.7 cm. LV wall thickness is normal, with the septum and the posterior wall each measuring 0.7 cm across. Relative wall   thickness was normal at 0.36, and the LV mass index was 48.8 g/m2 consistent with normal left ventricular mass.  Global left ventricular systolic function appears normal. Visually estimated ejection fraction is 60-65%. The LV Doppler derived stroke volume    equals 56.0 ccs.   There is normal systolic/diastolic flow in the pulmonary vein indicating normal left atrial pressures. The E/e'(lat) is 13, consistent with diastolic dysfunction secondary to relaxation abnormality.     Right Atrium: The right atrium is normal in size, measuring 4.2 cm in length and 3.0 cm in width in the apical view.     Right Ventricle: The right ventricle is normal in size measuring 2.8 cm at the base in the apical right ventricle-focused view. Global right ventricular systolic function appears normal. Tricuspid annular plane systolic excursion (TAPSE) is 2.0 cm.     Aortic Valve:  The aortic valve is normal in structure with normal leaflet mobility.     Mitral Valve:  The mitral valve is normal in structure with normal leaflet mobility.     Tricuspid Valve:  The tricuspid valve is normal in structure with normal leaflet mobility.     Intracavitary: There is no evidence of pericardial effusion, intracavity mass, thrombi, or vegetation.     Post Exercise Imaging:     Immediate post exercise images demonstrate left ventricular function augmenting. Right ventricular function augments. Left ventricular end systolic volume decreases.     The left ventricle is globally hyperdynamic and no exercise induced wall motion abnormalities were identified.       CONCLUSIONS     1 - Normal left ventricular systolic function (EF 60-65%).     2 - Left ventricular diastolic dysfunction.     3 - Normal right ventricular systolic function .     No evidence of stress induced myocardial ischemia.     This document has been electronically    SIGNED BY: Angeline Mendoza MD On: 12/22/2015 19:01   This result has not been signed. Information might be incomplete.      Specimen Collected: 12/22/15 11:15 CST Last Resulted: 12/22/15 19:09 CST            The echocardiogram images  were reviewed by me.      X-Ray Chest PA And Lateral  Status: Final result     MyChart Results Release    MedPageTodayt Status: Active  Results Release     PACS Images for [facility] [place] Viewer     Show images for X-Ray Chest PA And Lateral  All Reviewers List    Alonso Phillips MD on 3/7/2024 18:09     X-Ray Chest PA And Lateral  Order: [id number]  Status: Final result     Visible to patient: Yes (seen)     Next appt: 05/13/2024 at 02:40 PM in Otolaryngology (Ryland Warren MD)     Dx: SOB (shortness of breath) on exertion     0 Result Notes  Details    Reading Physician Reading Date Result Priority   Néstor Biswas MD  [phone]  [phone] 2/29/2024 Routine     Narrative & Impression  EXAMINATION:  XR CHEST PA AND LATERAL     CLINICAL HISTORY:  Shortness of breath     TECHNIQUE:  PA and lateral views of the chest were performed.     COMPARISON:  No 08/11/2020 ne     FINDINGS:  Heart size normal.  The lungs are clear.  No pleural effusion     Impression:     See above        Electronically signed by: Néstor Biswas MD  Date:                                            02/29/2024  Time:                                           08:45           Exam Ended: 02/28/24 15:32 CST         Encounter Form Printed    Encounter form printed on           CTA Chest Abdomen Pelvis  Status: Edited Result - FINAL     MyChart Results Release    [employer] Status: Active  Results Release     PACS Images for [employer] Viewer     Show images for CTA Chest Abdomen Pelvis  All Reviewers List    Gracia Fraser DO on 9/2/2016 16:37     CTA Chest Abdomen Pelvis  Order: [id number]  Status: Edited Result - FINAL     Visible to patient: No (not released)     Next appt: 05/13/2024 at 02:40 PM in Otolaryngology (Ryland Warren MD)     0 Result Notes  Details    Reading Physician Reading Date Result Priority   Ej Martinez MD  [phone] 11/21/2015      Addenda        Addendum: Patient reports having prior hysterectomy, but  unsure as to whether total versus partial.  Therefore it is possible that the homogeneous soft tissue attenuation within the midline lower pelvis could represent cervical cuff more partial uterus.  The adjacent 2.4-cm round soft tissue density mass within the right pelvis could possibly represent a subserosal fibroid the patient has remaining partial uterus versus somewhat low position right adnexa versus other nonspecific soft tissue attenuation pelvic mass including endometrioma, among others.  Recommend correlation with any prior imaging versus further evaluation with a pelvic ultrasound.     These findings were discussed with Dr. Fraser at 2045 hours on 11/21/15 by Dr. Martinez.        Electronically signed by: BRAYAN MARTINEZ MD, MD  Date:                                            11/22/15  Time:                                           00:48   Signed by Brayan Martinez MD on 11/22/2015 00:49  Narrative & Impression  CTA chest, abdomen and pelvis.     Comparison: Chest radiograph earlier same date and lumbar spine CT 11/25/13.  No prior CT for comparison.     Technique: Axial images were obtained through the chest, abdomen and pelvis  following the administration of 100 cc of Omnipaque 350intravenous contrast per CTA protocol.  Coronal and sagittal reformatted images were obtained.     Findings:     Aorta: There is a left-sided arch with bovine configuration.  No significant atherosclerosis or evidence of aortic dissection or aneurysm in the chest, abdomen or pelvis.     Chest:  The heart is normal in size without significant pericardial fluid.  No evidence for cardiac thrombus.  The pulmonary trunk and bilateral main pulmonary arteries are normal in size.  No evidence for saddle embolus or central pulmonary thrombo-embolism.     The esophagus appears normal in caliber and contour along its course.  Note mediastinal, hilar or axillary lymphadenopathy.     The trachea and major bronchi are patent.  Minimal  dependent atelectasis.  There is a 2-mm noncalcified pulmonary nodule within the right middle lobe. According to Fleishner society guidelines, if this patient has low risk for malignancy, no follow-up CT is necessary.  If this patient is high risk for malignancy, follow-up chest CT in 12 months is recommended.  No large consolidation, pleural effusion or pneumothorax.     The structures at the base of the neck are without significant abnormality.  The extrathoracic soft tissues are without significant abnormality.        Abdomen and pelvis:  The liver, gallbladder, pancreas, spleen, stomach, duodenum, and adrenal glands are without significant abnormality.  No intra-or extrahepatic biliary ductal dilatation.     The kidneys are normal in size, shape and location with symmetric perfusion. No hydronephrosis.  The urinary bladder is within normal limits.  There is an approximate 2.4-cm rounded mass arising from the anterior right aspect of the uterine fundus suggestive for a subserosal fibroid.  Pelvic phleboliths noted.  Bilateral adnexal regions are grossly within normal limits.     No ascites or free air.  No lymphadenopathy.     The appendix is not identified.  Terminal ileum is within normal limits.  The small and large loops of bowel are normal in caliber without focal wall thickening or adjacent fat stranding.       Celiac axis, SMA, MUKUND and bilateral renal arteries appear patent without aneurysm or dissection.      Bones: The osseous structures show minimal degenerative change without acute or destructive process.  IMPRESSION:         1.  No significant atherosclerosis or evidence of aortic dissection or aneurysm.       2.  No acute cardiopulmonary process or intra-abdominal or pelvic process seen.  No central pulmonary thromboembolus identified.     3. Right middle lobe pulmonary micronodule with recommendations above.     4.  Right uterine fundic region probable 2.4-cm subserosal fibroid.            Electronically signed by: BRAYAN MCMAHON MD, MD  Date:                                            11/21/15  Time:                                           19:14            Exam Ended: 11/21/15 18:35 CST Last Resulted: 11/22/15 00:48 CST            Next appt: 05/13/2024 at 02:40 PM in Otolaryngology (Ryland Warren MD)     Dx: Well adult exam     0 Result Notes             Component Ref Range & Units 11 mo ago 2 yr ago 3 yr ago 4 yr ago 5 yr ago 6 yr ago 7 yr ago   TSH 0.400 - 4.000 uIU/mL 1.535  1.010  1.435  1.217  1.386  1.193  1.007    Resulting Agency  [facility]              Specimen Collected: 04/15/23 08:42 CDT Last Resulted: 04/15/23 17:48 CDT        Lab Flowsheet     Order Details     View Encounter     Lab and Collection Details     Routing     Result History     View All Conversations on this Encounter             Assessment:     1. Dyspnea on exertion    2. Atrial septal aneurysm    3. Chest pain of uncertain etiology    4. Pure hypercholesterolemia    5. Type 2 diabetes mellitus without complication, without long-term current use of insulin      Plan:   Joanne was seen today for hyperlipidemia, atrial septal defect, shortness of breath and palpitations.    Diagnoses and all orders for this visit:    Dyspnea on exertion  -     IN OFFICE EKG 12-LEAD (to [place])  -     Echo Saline Bubble? Yes; Future  -     Stress Echo Which stress agent will be used? Treadmill Exercise; Color Flow Doppler? No; Future  -     CK; Future  -     Comprehensive Metabolic Panel; Future  -     Lipid Panel; Future    Atrial septal aneurysm  -     Ambulatory referral/consult to Cardiology  -     IN OFFICE EKG 12-LEAD (to [place])  -     Echo Saline Bubble? Yes; Future  -     Stress Echo Which stress agent will be used? Treadmill Exercise; Color Flow Doppler? No; Future  -     CK; Future  -     Comprehensive Metabolic Panel; Future  -     Lipid Panel; Future    Chest pain of uncertain etiology  -     IN  OFFICE EKG 12-LEAD (to [place])  -     Echo Saline Bubble? Yes; Future  -     Stress Echo Which stress agent will be used? Treadmill Exercise; Color Flow Doppler? No; Future  -     CK; Future  -     Comprehensive Metabolic Panel; Future  -     Lipid Panel; Future    Pure hypercholesterolemia  -     IN OFFICE EKG 12-LEAD (to [place])  -     Echo Saline Bubble? Yes; Future  -     Stress Echo Which stress agent will be used? Treadmill Exercise; Color Flow Doppler? No; Future  -     CK; Future  -     Comprehensive Metabolic Panel; Future  -     Lipid Panel; Future    Type 2 diabetes mellitus without complication, without long-term current use of insulin  -     IN OFFICE EKG 12-LEAD (to [place])  -     Echo Saline Bubble? Yes; Future  -     Stress Echo Which stress agent will be used? Treadmill Exercise; Color Flow Doppler? No; Future  -     CK; Future  -     Comprehensive Metabolic Panel; Future  -     Lipid Panel; Future     Will repeat the treadmill stress echocardiogram since the dyspnea on exertion may represent an anginal equivalent    Pt needs a repeat lipid profile after taking the Crestor  daily for 2 months.  May need to add Zetia or Repatha (or Praluent).  Pt reports she was taking the rosuvastatin less than once a week in the months prior to her most recent blood test.    Will obtain a bubble study to see if pt has evidence of a PFO or ASD (which could be associated with an atrial septal aneurysm) .  If there is evidence of a right to left shunt would begin ASA 81 mg daily    Follow up in about 2 months (around 5/13/2024).  With repeat lipid profile, CMP and CPK    F/u with Dr Phillips

## 2024-03-14 LAB
OHS QRS DURATION: 82 MS
OHS QTC CALCULATION: 429 MS

## 2024-03-28 ENCOUNTER — HOSPITAL ENCOUNTER (OUTPATIENT)
Dept: CARDIOLOGY | Facility: HOSPITAL | Age: 59
Discharge: HOME | End: 2024-03-28
Attending: INTERNAL MEDICINE
Payer: COMMERCIAL

## 2024-03-28 ENCOUNTER — TELEPHONE (OUTPATIENT)
Dept: CARDIOLOGY | Facility: CLINIC | Age: 59
End: 2024-03-28

## 2024-03-28 DIAGNOSIS — E78.00 PURE HYPERCHOLESTEROLEMIA: ICD-10-CM

## 2024-03-28 DIAGNOSIS — R07.9 CHEST PAIN OF UNCERTAIN ETIOLOGY: ICD-10-CM

## 2024-03-28 DIAGNOSIS — R06.09 DYSPNEA ON EXERTION: ICD-10-CM

## 2024-03-28 DIAGNOSIS — I25.3 ATRIAL SEPTAL ANEURYSM: ICD-10-CM

## 2024-03-28 DIAGNOSIS — E11.9 TYPE 2 DIABETES MELLITUS WITHOUT COMPLICATION, WITHOUT LONG-TERM CURRENT USE OF INSULIN: ICD-10-CM

## 2024-03-28 LAB
CV STRESS BASE HR: 74 BPM
DIASTOLIC BLOOD PRESSURE: 76 MMHG
EJECTION FRACTION: 70 %
OHS CV CPX 1 MINUTE RECOVERY HEART RATE: 107 BPM
OHS CV CPX 85 PERCENT MAX PREDICTED HEART RATE MALE: 138
OHS CV CPX ESTIMATED METS: 7
OHS CV CPX MAX PREDICTED HEART RATE: 162
OHS CV CPX PATIENT IS FEMALE: 1
OHS CV CPX PATIENT IS MALE: 0
OHS CV CPX PEAK DIASTOLIC BLOOD PRESSURE: 98 MMHG
OHS CV CPX PEAK HEAR RATE: 148 BPM
OHS CV CPX PEAK RATE PRESSURE PRODUCT: NORMAL
OHS CV CPX PEAK SYSTOLIC BLOOD PRESSURE: 203 MMHG
OHS CV CPX PERCENT MAX PREDICTED HEART RATE ACHIEVED: 96
OHS CV CPX RATE PRESSURE PRODUCT PRESENTING: NORMAL
STRESS ECHO POST EXERCISE DUR MIN: 6 MINUTES
STRESS ECHO POST EXERCISE DUR SEC: 12 SECONDS
STRESS ST DEPRESSION: 1 MM
SYSTOLIC BLOOD PRESSURE: 139 MMHG

## 2024-03-28 PROCEDURE — 93351 STRESS TTE COMPLETE: CPT | Mod: 26,,, | Performed by: INTERNAL MEDICINE

## 2024-03-28 PROCEDURE — 93352 ADMIN ECG CONTRAST AGENT: CPT | Mod: ,,, | Performed by: INTERNAL MEDICINE

## 2024-03-28 PROCEDURE — 93351 STRESS TTE COMPLETE: CPT

## 2024-04-15 ENCOUNTER — HOSPITAL ENCOUNTER (EMERGENCY)
Facility: HOSPITAL | Age: 59
Discharge: HOME | End: 2024-04-15
Attending: EMERGENCY MEDICINE
Payer: COMMERCIAL

## 2024-04-15 VITALS
TEMPERATURE: 98 F | WEIGHT: 145 LBS | DIASTOLIC BLOOD PRESSURE: 64 MMHG | HEIGHT: 57 IN | BODY MASS INDEX: 31.28 KG/M2 | RESPIRATION RATE: 18 BRPM | SYSTOLIC BLOOD PRESSURE: 132 MMHG | OXYGEN SATURATION: 100 % | HEART RATE: 68 BPM

## 2024-04-15 DIAGNOSIS — N94.89 ADNEXAL MASS: Primary | ICD-10-CM

## 2024-04-15 DIAGNOSIS — R10.32 LEFT LOWER QUADRANT ABDOMINAL PAIN: ICD-10-CM

## 2024-04-15 LAB
ALBUMIN SERPL BCP-MCNC: 3.8 G/DL (ref 3.5–5.2)
ALP SERPL-CCNC: 73 U/L (ref 55–135)
ALT SERPL W/O P-5'-P-CCNC: 14 U/L (ref 10–44)
ANION GAP SERPL CALC-SCNC: 8 MMOL/L (ref 8–16)
AST SERPL-CCNC: 20 U/L (ref 10–40)
BASOPHILS # BLD AUTO: 0.02 K/UL (ref 0–0.2)
BASOPHILS NFR BLD: 0.3 % (ref 0–1.9)
BILIRUB SERPL-MCNC: 0.1 MG/DL (ref 0.1–1)
BILIRUB UR QL STRIP: NEGATIVE
BUN SERPL-MCNC: 11 MG/DL (ref 6–20)
CALCIUM SERPL-MCNC: 9.3 MG/DL (ref 8.7–10.5)
CHLORIDE SERPL-SCNC: 109 MMOL/L (ref 95–110)
CLARITY UR: CLEAR
CO2 SERPL-SCNC: 24 MMOL/L (ref 23–29)
COLOR UR: YELLOW
CREAT SERPL-MCNC: 0.9 MG/DL (ref 0.5–1.4)
DIFFERENTIAL METHOD BLD: ABNORMAL
EOSINOPHIL # BLD AUTO: 0.1 K/UL (ref 0–0.5)
EOSINOPHIL NFR BLD: 1.8 % (ref 0–8)
ERYTHROCYTE [DISTWIDTH] IN BLOOD BY AUTOMATED COUNT: 13.1 % (ref 11.5–14.5)
EST. GFR  (NO RACE VARIABLE): >60 ML/MIN/1.73 M^2
GLUCOSE SERPL-MCNC: 97 MG/DL (ref 70–110)
GLUCOSE UR QL STRIP: NEGATIVE
HCT VFR BLD AUTO: 40.1 % (ref 37–48.5)
HGB BLD-MCNC: 12.8 G/DL (ref 12–16)
HGB UR QL STRIP: ABNORMAL
IMM GRANULOCYTES # BLD AUTO: 0.02 K/UL (ref 0–0.04)
IMM GRANULOCYTES NFR BLD AUTO: 0.3 % (ref 0–0.5)
KETONES UR QL STRIP: NEGATIVE
LEUKOCYTE ESTERASE UR QL STRIP: NEGATIVE
LIPASE SERPL-CCNC: 46 U/L (ref 4–60)
LYMPHOCYTES # BLD AUTO: 1.5 K/UL (ref 1–4.8)
LYMPHOCYTES NFR BLD: 22.2 % (ref 18–48)
MCH RBC QN AUTO: 28.4 PG (ref 27–31)
MCHC RBC AUTO-ENTMCNC: 31.9 G/DL (ref 32–36)
MCV RBC AUTO: 89 FL (ref 82–98)
MONOCYTES # BLD AUTO: 0.4 K/UL (ref 0.3–1)
MONOCYTES NFR BLD: 6.5 % (ref 4–15)
NEUTROPHILS # BLD AUTO: 4.7 K/UL (ref 1.8–7.7)
NEUTROPHILS NFR BLD: 68.9 % (ref 38–73)
NITRITE UR QL STRIP: NEGATIVE
NRBC BLD-RTO: 0 /100 WBC
PH UR STRIP: 6 [PH] (ref 5–8)
PLATELET # BLD AUTO: 313 K/UL (ref 150–450)
PMV BLD AUTO: 10 FL (ref 9.2–12.9)
POTASSIUM SERPL-SCNC: 4.2 MMOL/L (ref 3.5–5.1)
PROT SERPL-MCNC: 7.7 G/DL (ref 6–8.4)
PROT UR QL STRIP: NEGATIVE
RBC # BLD AUTO: 4.51 M/UL (ref 4–5.4)
SODIUM SERPL-SCNC: 141 MMOL/L (ref 136–145)
SP GR UR STRIP: 1.02 (ref 1–1.03)
URN SPEC COLLECT METH UR: ABNORMAL
UROBILINOGEN UR STRIP-ACNC: NEGATIVE EU/DL
WBC # BLD AUTO: 6.81 K/UL (ref 3.9–12.7)

## 2024-04-15 PROCEDURE — 99285 EMERGENCY DEPT VISIT HI MDM: CPT | Mod: 25

## 2024-04-15 PROCEDURE — 85025 COMPLETE CBC W/AUTO DIFF WBC: CPT | Performed by: NURSE PRACTITIONER

## 2024-04-15 PROCEDURE — 80053 COMPREHEN METABOLIC PANEL: CPT | Performed by: NURSE PRACTITIONER

## 2024-04-15 PROCEDURE — 81003 URINALYSIS AUTO W/O SCOPE: CPT | Performed by: NURSE PRACTITIONER

## 2024-04-15 PROCEDURE — 63600175 PHARM REV CODE 636 W HCPCS: Performed by: NURSE PRACTITIONER

## 2024-04-15 PROCEDURE — 83690 ASSAY OF LIPASE: CPT | Performed by: NURSE PRACTITIONER

## 2024-04-15 PROCEDURE — 96374 THER/PROPH/DIAG INJ IV PUSH: CPT

## 2024-04-15 RX ORDER — IBUPROFEN 800 MG/1
800 TABLET ORAL EVERY 6 HOURS PRN
Qty: 20 TABLET | Refills: 0 | Status: SHIPPED | OUTPATIENT
Start: 2024-04-15

## 2024-04-15 RX ORDER — KETOROLAC TROMETHAMINE 30 MG/ML
15 INJECTION, SOLUTION INTRAMUSCULAR; INTRAVENOUS
Status: COMPLETED | OUTPATIENT
Start: 2024-04-15 | End: 2024-04-15

## 2024-04-15 RX ADMIN — KETOROLAC TROMETHAMINE 15 MG: 30 INJECTION, SOLUTION INTRAMUSCULAR; INTRAVENOUS at 10:04

## 2024-04-15 NOTE — ED TRIAGE NOTES
Pt arrived with c/o L groin worsening since Thursday.  Pt states it started out as a dull pain, but has gotten worse.  Worse when going from sitting to standing or lying down to standing.  Denies any discoloration or swelling to area.  Denies taking any OTC meds for symptoms.  Pt answering questions appropriately, speaking in complete sentences, respirations even and unlabored.  Aao x 4.

## 2024-04-15 NOTE — ED NOTES
Patient states left side groin pain since Thursday. When standing feels like dull pressure but with movement pain worsen. Patient denies any injury or heavy lifting.

## 2024-04-15 NOTE — ED PROVIDER NOTES
Encounter Date: 4/15/2024       History     Chief Complaint   Patient presents with    Groin Pain     Pt reports to ED with c/o left groin pain that began Thursday. Pt reports pain worsens with movement. Pt denies medication PTA.      58 yr old female presents to the ER with reports of left lower abd pain/groin pain also that began last week. Pt states no injury or heavy lifting. No dysuria. No vaginal dc or bleeding. No vomiting or diarrhea. Pt states she did have to take a laxative for BM which was normal last night. NO melena or hematochezia. PMH of depression, DM, Hyperlipidemia and chronic constipation. No fever. NO leg pain or swelling.     The history is provided by the patient. No  was used.     Review of patient's allergies indicates:   Allergen Reactions    Iodine Hives and Swelling    Shellfish containing products Hives and Swelling     Past Medical History:   Diagnosis Date    Chronic constipation     Depression     Diabetes mellitus     Hyperlipidemia      Past Surgical History:   Procedure Laterality Date     SECTION      COLONOSCOPY N/A 2016    Procedure: COLONOSCOPY;  Surgeon: Angel Sawyer MD;  Location: [address]);  Service: Endoscopy;  Laterality: N/A;    HYSTERECTOMY      ALLIE, ovarires and cervix  remain (fibroids)    SURGICAL REMOVAL OF MASS OF AXILLA Right 10/20/2020    Procedure: EXCISION, MASS, AXILLARY;  Surgeon: Ted Baumann MD;  Location: [facility];  Service: General;  Laterality: Right;     Family History   Problem Relation Name Age of Onset    Diabetes Mother Mom     Hypertension Mother Mom     Hyperlipidemia Mother Mom     Glaucoma Mother Mom     Kidney failure Mother Mom     Lung cancer Father Dad         lung    Arthritis Father Dad     Cancer Father Dad     No Known Problems Sister      No Known Problems Brother      No Known Problems Maternal Aunt      No Known Problems Maternal Uncle      No Known Problems Paternal Aunt      No Known  Problems Paternal Uncle      Glaucoma Maternal Grandmother      No Known Problems Maternal Grandfather      No Known Problems Paternal Grandmother      No Known Problems Paternal Grandfather      Breast cancer Neg Hx      Colon cancer Neg Hx      Ovarian cancer Neg Hx      Cataracts Neg Hx      Macular degeneration Neg Hx      Retinal detachment Neg Hx      Strabismus Neg Hx      Blindness Neg Hx      Amblyopia Neg Hx       Social History     Tobacco Use    Smoking status: Never     Passive exposure: Never    Smokeless tobacco: Never   Substance Use Topics    Alcohol use: Yes     Alcohol/week: 1.0 standard drink of alcohol     Types: 1 Glasses of wine per week     Comment: occasional use only/ not weekly    Drug use: No     Review of Systems   Gastrointestinal:  Positive for abdominal pain.   All other systems reviewed and are negative.      Physical Exam     Initial Vitals [04/15/24 0926]   BP Pulse Resp Temp SpO2   (!) 150/94 76 18 97.8 °F (36.6 °C) 100 %      MAP       --         Physical Exam    Constitutional: She appears well-developed and well-nourished.   HENT:   Head: Normocephalic.   Right Ear: Hearing and tympanic membrane normal.   Left Ear: Hearing and tympanic membrane normal.   Nose: Nose normal.   Mouth/Throat: Oropharynx is clear and moist.   Eyes: Lids are normal. Pupils are equal, round, and reactive to light.   Neck:   Normal range of motion.  Cardiovascular:  Normal rate.           Pulmonary/Chest: Breath sounds normal. No respiratory distress. She has no wheezes. She has no rhonchi.   Abdominal: Abdomen is soft. There is abdominal tenderness in the left lower quadrant.   No lymphadenopathy in the inguinal region. No hernia noted, no skin changes.    No right CVA tenderness.  No left CVA tenderness.   Musculoskeletal:         General: Normal range of motion.      Cervical back: Normal range of motion. No rigidity.      Left hip: No deformity, tenderness or bony tenderness.      Comments: Normal  internal and external rotation of the hip with no skin changes noted.      Neurological: She is alert and oriented to person, place, and time.   Skin: Skin is warm and dry. No rash noted.   Psychiatric: She has a normal mood and affect. Her behavior is normal. Judgment and thought content normal.         ED Course   Procedures  Labs Reviewed   URINALYSIS, REFLEX TO URINE CULTURE - Abnormal; Notable for the following components:       Result Value    Occult Blood UA Trace (*)     All other components within normal limits    Narrative:     Specimen Source->Urine   CBC W/ AUTO DIFFERENTIAL - Abnormal; Notable for the following components:    MCHC 31.9 (*)     All other components within normal limits   COMPREHENSIVE METABOLIC PANEL   LIPASE          Imaging Results              CT Abdomen Pelvis  Without Contrast (Final result)  Result time 04/15/24 11:27:57      Final result by Dimitry George III, MD (04/15/24 11:27:57)                   Impression:      No acute process seen.    Stable right adnexal mass.  MRI could be helpful.  This could be a pedunculated fibroid.      Electronically signed by: Dimitry George MD  Date:    04/15/2024  Time:    11:27               Narrative:    EXAMINATION:  CT ABDOMEN PELVIS WITHOUT CONTRAST    CLINICAL HISTORY:  LLQ abdominal pain;    FINDINGS:  Comparison is 09/12/2016.    Lung bases are clear.  The liver, gallbladder, biliary tree, spleen, stomach, pancreas and duodenum show nothing unusual.  The adrenal glands are not enlarged.  The kidneys demonstrate no mass, scar, stone, or hydronephrosis.  No para-aortic, retroperitoneal, or mesenteric adenopathy is seen.  No obstruction, ileus, or perforation is seen.  There is no ascites.  No bowel inflammation is seen.  No abscess is seen.  There is a stable right adnexal mass measuring 2.2 cm.  No GI tract inflammation seen.  Bones demonstrate minimal DJD.                                       Medications   ketorolac injection 15 mg  (15 mg Intravenous Given 4/15/24 1035)     Medical Decision Making  Differential Diagnosis includes, but is not limited to:  mesenteric ischemia, perforated viscous, MI/ACS, SBO/volvulus, incarcerated/strangulated hernia, intussusception, ileus, appendicitis, cholecystitis, cholangitis, diverticulitis, esophagitis, hepatitis, nephrolithiasis, pancreatitis, gastroenteritis, colitis, IBD/IBS, biliary colic, GERD, PUD, constipation, UTI/pyelonephritis,  disorder.       Amount and/or Complexity of Data Reviewed  Labs: ordered. Decision-making details documented in ED Course.  Radiology: ordered.    Risk  Prescription drug management.               ED Course as of 04/15/24 1212   Mon Apr 15, 2024   1020 Urinalysis, Reflex to Urine Culture Urine, Clean Catch(!) [DT]   1043 CBC auto differential(!) [DT]   1148 Lipase [DT]   1148 Comprehensive metabolic panel [DT]   1149    FINDINGS:  Comparison is 09/12/2016.     Lung bases are clear.  The liver, gallbladder, biliary tree, spleen, stomach, pancreas and duodenum show nothing unusual.  The adrenal glands are not enlarged.  The kidneys demonstrate no mass, scar, stone, or hydronephrosis.  No para-aortic, retroperitoneal, or mesenteric adenopathy is seen.  No obstruction, ileus, or perforation is seen.  There is no ascites.  No bowel inflammation is seen.  No abscess is seen.  There is a stable right adnexal mass measuring 2.2 cm.  No GI tract inflammation seen.  Bones demonstrate minimal DJD.     Impression:     No acute process seen.     Stable right adnexal mass.  MRI could be helpful.  This could be a pedunculated fibroid.   [DT]   1210 Pt notified of the adnexal mass on the right that appears stable to prior readings. She will need to follow up with Dr Deleon for repeat imaging. Pt reports improvement in her condition after the Toradol was given. No emergent condition identified on todays exam. Pt is not toxic in appearance. She will need to return if condition worsens  otherwise will be sent home with Ibuprofen [DT]      ED Course User Index  [DT] Ericka Martínez NP                           Clinical Impression:  Final diagnoses:  [N94.89] Adnexal mass (Primary)  [R10.32] Left lower quadrant abdominal pain          ED Disposition Condition    Discharge Stable          ED Prescriptions       Medication Sig Dispense Start Date End Date Auth. Provider    ibuprofen (ADVIL,MOTRIN) 800 MG tablet Take 1 tablet (800 mg total) by mouth every 6 (six) hours as needed for Pain. 20 tablet 4/15/2024 -- Ericka Martínez NP          Follow-up Information       Follow up With Specialties Details Why Contact Info    Alonso Phillips MD Internal Medicine Schedule an appointment as soon as possible for a visit in 2 days  [address] [facility]  [place] [address]  [phone]      Francine Deleon MD Obstetrics and Gynecology Schedule an appointment as soon as possible for a visit in 2 days  [address] [place] [address]  [phone]               Ericka Martínez NP  04/15/24 1212

## 2024-04-15 NOTE — DISCHARGE INSTRUCTIONS

## 2024-04-15 NOTE — Clinical Note
"Joanne"Rayo Stallings was seen and treated in our emergency department on 4/15/2024.  She may return to work on 04/17/2024.       If you have any questions or concerns, please don't hesitate to call.      Ericka Martínez, NP"

## 2024-04-17 ENCOUNTER — LAB VISIT (OUTPATIENT)
Dept: LAB | Facility: HOSPITAL | Age: 59
End: 2024-04-17
Attending: OBSTETRICS & GYNECOLOGY
Payer: COMMERCIAL

## 2024-04-17 ENCOUNTER — OFFICE VISIT (OUTPATIENT)
Dept: OBSTETRICS AND GYNECOLOGY | Facility: CLINIC | Age: 59
End: 2024-04-17
Payer: COMMERCIAL

## 2024-04-17 VITALS
BODY MASS INDEX: 32.82 KG/M2 | DIASTOLIC BLOOD PRESSURE: 84 MMHG | SYSTOLIC BLOOD PRESSURE: 143 MMHG | WEIGHT: 151.69 LBS

## 2024-04-17 DIAGNOSIS — N94.89 ADNEXAL MASS: Primary | ICD-10-CM

## 2024-04-17 DIAGNOSIS — N94.89 ADNEXAL MASS: ICD-10-CM

## 2024-04-17 LAB — CANCER AG125 SERPL-ACNC: 7 U/ML (ref 0–30)

## 2024-04-17 PROCEDURE — 36415 COLL VENOUS BLD VENIPUNCTURE: CPT | Performed by: OBSTETRICS & GYNECOLOGY

## 2024-04-17 PROCEDURE — 99999 PR PBB SHADOW E&M-EST. PATIENT-LVL III: CPT | Mod: PBBFAC,,, | Performed by: OBSTETRICS & GYNECOLOGY

## 2024-04-17 PROCEDURE — 86304 IMMUNOASSAY TUMOR CA 125: CPT | Performed by: OBSTETRICS & GYNECOLOGY

## 2024-04-17 PROCEDURE — 3077F SYST BP >= 140 MM HG: CPT | Mod: CPTII,S$GLB,, | Performed by: OBSTETRICS & GYNECOLOGY

## 2024-04-17 PROCEDURE — 3044F HG A1C LEVEL LT 7.0%: CPT | Mod: CPTII,S$GLB,, | Performed by: OBSTETRICS & GYNECOLOGY

## 2024-04-17 PROCEDURE — 1159F MED LIST DOCD IN RCRD: CPT | Mod: CPTII,S$GLB,, | Performed by: OBSTETRICS & GYNECOLOGY

## 2024-04-17 PROCEDURE — 3008F BODY MASS INDEX DOCD: CPT | Mod: CPTII,S$GLB,, | Performed by: OBSTETRICS & GYNECOLOGY

## 2024-04-17 PROCEDURE — 99213 OFFICE O/P EST LOW 20 MIN: CPT | Mod: S$GLB,,, | Performed by: OBSTETRICS & GYNECOLOGY

## 2024-04-17 PROCEDURE — 3072F LOW RISK FOR RETINOPATHY: CPT | Mod: CPTII,S$GLB,, | Performed by: OBSTETRICS & GYNECOLOGY

## 2024-04-17 PROCEDURE — 3079F DIAST BP 80-89 MM HG: CPT | Mod: CPTII,S$GLB,, | Performed by: OBSTETRICS & GYNECOLOGY

## 2024-04-17 NOTE — PROGRESS NOTES
Chief Complaint   Patient presents with    Follow-up       HISTORY OF PRESENT ILLNESS:   Joanne Stallings is a 58 y.o. female  S/p supracervical hysterectomy who presents for f/u ER. Had hysterectomy many years ago. Started having left hip/groin pain and went to ER and they felt it was a muscle strain but saw a mass on the right ovary so wanted her to follow it up.      Past Medical History:   Diagnosis Date    Chronic constipation     Depression     Diabetes mellitus     Hyperlipidemia           Past Surgical History:   Procedure Laterality Date     SECTION      COLONOSCOPY N/A 2016    Procedure: COLONOSCOPY;  Surgeon: Angel Sawyer MD;  Location: [facility] ENDO ([address] [place] [address]);  Service: Endoscopy;  Laterality: N/A;    HYSTERECTOMY      ALLIE, ovarires and cervix  remain (fibroids)    SURGICAL REMOVAL OF MASS OF AXILLA Right 10/20/2020    Procedure: EXCISION, MASS, AXILLARY;  Surgeon: Ted Baumann MD;  Location: [facility] OR;  Service: General;  Laterality: Right;         Social History     Socioeconomic History    Marital status: Single   Tobacco Use    Smoking status: Never     Passive exposure: Never    Smokeless tobacco: Never   Substance and Sexual Activity    Alcohol use: Yes     Alcohol/week: 1.0 standard drink of alcohol     Types: 1 Glasses of wine per week     Comment: occasional use only/ not weekly    Drug use: No    Sexual activity: Yes     Partners: Male     Birth control/protection: None   Social History Narrative    Work:      Family: Patient is  2 children.     Habits: Nonsmoker, occasional etoh    Diet/Exercise:     Other:         Social Determinants of Health     Financial Resource Strain: Low Risk  (2021)    Overall Financial Resource Strain (CARDIA)     Difficulty of Paying Living Expenses: Not hard at all   Food Insecurity: No Food Insecurity (2021)    Hunger Vital Sign     Worried About Running Out of Food in the Last Year: Never true     Ran Out of Food in the Last  Year: Never true   Transportation Needs: No Transportation Needs (9/30/2021)    PRAPARE - Transportation     Lack of Transportation (Medical): No     Lack of Transportation (Non-Medical): No   Physical Activity: Inactive (12/14/2021)    Exercise Vital Sign     Days of Exercise per Week: 0 days     Minutes of Exercise per Session: 0 min   Stress: No Stress Concern Present (12/14/2021)    [employer] - Occupational Stress Questionnaire     Feeling of Stress : Only a little   Recent Concern: Stress - Stress Concern Present (9/30/2021)    [employer] - Occupational Stress Questionnaire     Feeling of Stress : To some extent   Social Connections: Unknown (12/14/2021)    Social Connection and Isolation Panel [NHANES]     Frequency of Communication with Friends and Family: More than three times a week     Frequency of Social Gatherings with Friends and Family: Once a week     Active Member of Clubs or Organizations: No     Attends Club or Organization Meetings: Never     Marital Status: Living with partner   Housing Stability: Low Risk  (12/14/2021)    Housing Stability Vital Sign     Unable to Pay for Housing in the Last Year: No     Number of Places Lived in the Last Year: 1     Unstable Housing in the Last Year: No       Family History   Problem Relation Name Age of Onset    Diabetes Mother Mom     Hypertension Mother Mom     Hyperlipidemia Mother Mom     Glaucoma Mother Mom     Kidney failure Mother Mom     Lung cancer Father Dad         lung    Arthritis Father Dad     Cancer Father Dad     No Known Problems Sister      No Known Problems Brother      No Known Problems Maternal Aunt      No Known Problems Maternal Uncle      No Known Problems Paternal Aunt      No Known Problems Paternal Uncle      Glaucoma Maternal Grandmother      No Known Problems Maternal Grandfather      No Known Problems Paternal Grandmother      No Known Problems Paternal Grandfather       Breast cancer Neg Hx      Colon cancer Neg Hx      Ovarian cancer Neg Hx      Cataracts Neg Hx      Macular degeneration Neg Hx      Retinal detachment Neg Hx      Strabismus Neg Hx      Blindness Neg Hx      Amblyopia Neg Hx           OB History    Para Term  AB Living   2 2 2     2   SAB IAB Ectopic Multiple Live Births           2      # Outcome Date GA Lbr Ladarius/2nd Weight Sex Type Anes PTL Lv   2 Term      CS-LTranv  N DENTON   1 Term      CS-LTranv  N DENTON       COMPREHENSIVE GYN HISTORY:  PAP History: Denies abnormal Paps  Infection History: Denies STDs. Denies PID.  Benign History: had uterine fibroids. Denies ovarian cysts. Denies endometriosis Denies other conditions.  Cancer History: Denies cervical cancer. Denies uterine cancer or hyperplasia. Denies ovarian cancer. Denies vulvar cancer or pre-cancer. Denies vaginal cancer or pre-cancer. Denies breast cancer. Denies colon cancer.  Cycle:  heavy   Supracervical hyst 2/2 AUB and fibroids     ROS:  Negative       BP (!) 143/84   Wt 68.8 kg (151 lb 10.8 oz)   LMP  (LMP Unknown)   BMI 32.82 kg/m²     APPEARANCE: Well nourished, well developed, in no acute distress.    PELVIC:   VULVA: No lesions. Normal female genitalia.    VAGINA: atrophic, no discharge, no significant cystocele or rectocele.  CERVIX: No lesions and discharge.  UTERUS: surgically absent   ADNEXA: No masses or tenderness.  PERINEUM: Normal, mo masses    2016 CT scan In the right adnexa, there is a soft tissue structure, similar in appearance to the previous examination, possibly representing the ovary or exophytic lesion arising from the uterus or uterine remnant. No bulky pelvic lymphadenopathy. No significant free fluid in the pelvis.   4/15/2024 CT scan: Stable right adnexal mass.  MRI could be helpful.  This could be a pedunculated fibroid.       1. Adnexal mass          A/P 1. Discussed mass in right ovary has been there since at least 2016 and not changed in size  so unlikely to be something concerning. Discussed choices for US vs MRI and she would prefer MRI. Will do Ca 125 but discussed with her this isn't elevated in all ovarian cancers. Will f/u after results.     Face to Face time with patient: 25 minutes of total time spent on the encounter, which includes face to face time and non-face to face time preparing to see the patient (eg, review of tests), Obtaining and/or reviewing separately obtained history, Documenting clinical information in the electronic or other health record, Independently interpreting results (not separately reported) and communicating results to the patient/family/caregiver, or Care coordination (not separately reported).

## 2024-04-22 ENCOUNTER — PATIENT MESSAGE (OUTPATIENT)
Dept: OBSTETRICS AND GYNECOLOGY | Facility: CLINIC | Age: 59
End: 2024-04-22
Payer: COMMERCIAL

## 2024-04-24 ENCOUNTER — TELEPHONE (OUTPATIENT)
Dept: PHARMACY | Facility: CLINIC | Age: 59
End: 2024-04-24
Payer: COMMERCIAL

## 2024-05-10 NOTE — PROGRESS NOTES
Well-controlled, continue current medications   Subjective:      Patient ID: Joanne Stallings is a 54 y.o. female.    Chief Complaint: Results of the Left Hand; Results of the Right Hand; and Results of the Right Shoulder      HPI  Joanne Stallings is a right hand dominant 54 y.o. female presenting today for follow up of recent EMG in preparation for right shoulder surgery.  She was evaluated by Dr. Flores in 7/2019 and scheduled for right shoulder arthroscopy to be performed on 12/2/19; EMG was ordered to evaluate extremity numbness prior to surgery. She reports that she experiences pain and numbness intermittently, the pain is more persistent in the right arm and the finger numbness occurs more in the left.  Recent EMG did show mild bilateral carpal tunnel syndrome; no evidence of brachial plexopathy or cervical radiculopathy.  Patient complains that the injury occurred 1 year ago at work, she was reaching for the door and felt pain in the right upper extremity from the shoulder to the fingers.  She knows decreased range of motion and pain with shoulder motion. She previously underwent a shoulder injection by physician lynn Campbell, also underwent physical therapy for the shoulder.  She reports that her pain is worse at night.  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      Review of patient's allergies indicates:   Allergen Reactions    Iodine Swelling     Other reaction(s): Hives    Shellfish containing products Swelling     Other reaction(s): Hives         Current Outpatient Medications   Medication Sig Dispense Refill    blood sugar diagnostic Strp Test blood sugar once daily 100 strip 3    blood sugar diagnostic Strp To check BG once daily, to use with insurance preferred meter 100 strip 3    blood-glucose meter kit To check BG1 times daily, to use with insurance preferred meter 1 each 3    boric acid (BORIC ACID) vaginal suppository Place 1 each (650 mg total) vaginally every evening. 30  "suppository 11    diclofenac sodium (PENNSAID) 20 mg/gram /actuation(2 %) sopm Apply 40 mg topically 2 (two) times daily. 1 Bottle 1    lancets Misc 1 each by Misc.(Non-Drug; Combo Route) route once daily. 50 each 11    meclizine (ANTIVERT) 25 mg tablet Take 1 tablet (25 mg total) by mouth 3 (three) times daily as needed. 30 tablet 0    rosuvastatin (CRESTOR) 20 MG tablet Take 1 tablet (20 mg total) by mouth once daily. 90 tablet 3    triamcinolone acetonide 0.1% (KENALOG) 0.1 % cream Apply topically 2 (two) times daily. 28.4 g 0    estradiol (ESTRACE) 0.01 % (0.1 mg/gram) vaginal cream Place 1 g vaginally twice a week. 42.5 g 3    metFORMIN (GLUCOPHAGE) 500 MG tablet Take 1 tablet (500 mg total) by mouth before dinner. 90 tablet 3     No current facility-administered medications for this visit.        Past Medical History:   Diagnosis Date    Chronic constipation     Depression     Diabetes mellitus     Hyperlipidemia        Past Surgical History:   Procedure Laterality Date     SECTION      COLONOSCOPY N/A 2016    Procedure: COLONOSCOPY;  Surgeon: Angel Sawyer MD;  Location: [facility] ([address] [place] [address]);  Service: Endoscopy;  Laterality: N/A;    HYSTERECTOMY  2007    ALLIE, ovarires and cervix  remain (fibroids)         Review of Systems:  Review of Systems   Constitution: Negative for chills and fever.   Skin: Negative for rash and suspicious lesions.   Musculoskeletal:        See HPI   Neurological: Negative for dizziness, headaches and light-headedness.   Psychiatric/Behavioral: Negative for depression. The patient is not nervous/anxious.          OBJECTIVE:     PHYSICAL EXAM:  Height: 4' 9" (144.8 cm) Weight: 68 kg (149 lb 14.6 oz)  Vitals:    19 1011   BP: 126/80   Pulse: 72   Weight: 68 kg (149 lb 14.6 oz)   Height: 4' 9" (1.448 m)   PainSc:   3     General    Vitals reviewed.  Constitutional: She is oriented to person, place, and time. She appears well-developed and well-nourished. "   HENT:   Head: Normocephalic and atraumatic.   Neck: Normal range of motion.   Cardiovascular: Normal rate.    Pulmonary/Chest: Effort normal. No respiratory distress.   Neurological: She is alert and oriented to person, place, and time.   Psychiatric: She has a normal mood and affect. Her behavior is normal. Judgment and thought content normal.             Musculoskeletal:  No scars or edema appreciated.  She is nontender palpation.  Good finger, wrist, and elbow range of motion bilaterally. Decreased right shoulder range of motion compared to left, forward flexion limited to approximately 160°, abduction limited to approximately 90°, ER limited compared to left, IR limited to L1, limited adduction.  Positive impingement on the right, negative on the left.  Neurovascularly intact-good sensation and motor function, good capillary refill, 2+ radial pulses.    RADIOGRAPHS:  Right Shoulder XRay, 7/25/19  FINDINGS:  The bones are intact.  There is no evidence for acute fracture or bone destruction.  Joint spaces are well maintained.  Soft tissues are unremarkable.      Impression     No evidence for acute fracture, bone destruction, or dislocation.     Right shoulder MRI, 9/10/18  FINDINGS:  There is a small amount of articular surface fraying at the insertion of the infraspinatus tendon.  Mild subcortical cystic change noted.  The supraspinatus, subscapularis, and teres minor are intact.  The muscle signal is within normal limits.  No muscle atrophy or volume loss.    The biceps tendon demonstrates normal size, signal, and location.    There is mild AC joint arthropathy.  The acromion is flat, type 1.    The labrum is grossly intact, noting limited evaluation without intra-articular contrast.  No para labral cyst.    No high-grade chondromalacia identified.  No subchondral marrow changes.    No fracture.  No marrow replacement process.  No fluid collections.      Impression     Articular surface fraying of the  infraspinatus tendon.  No discrete rotator cuff tear.  Mild AC joint arthropathy.     Comments: I have personally reviewed the imaging and I agree with the above radiologist's report.    EMG, 10/2019  Impression   This is an abnormal study. There is electrophysiologic evidence of:   1. Mild bilateral carpal tunnel syndrome comparatively worse on the left.   There is no electrophysiologic evidence of brachial plexopathy or cervical radiculopathy.     ASSESSMENT/PLAN:   Joanne was seen today for results, results and results.    Diagnoses and all orders for this visit:    Pain of right upper extremity    Carpal tunnel syndrome on both sides        - EMG results discussed with the patient  - discussed conservative and surgical treatment of carpal tunnel syndrome.  Discussed nighttime bracing, nerve glides, steroid injection, surgery. Patient will start nerve glides and nighttime bracing.  - right shoulder arthroscopy as scheduled, consent forms previously performed at last visit  - follow-up after surgery    Disclaimer: This note has been generated using voice-recognition software. There may be typographical errors that have been missed during proof-reading.

## 2024-05-13 ENCOUNTER — OFFICE VISIT (OUTPATIENT)
Dept: OTOLARYNGOLOGY | Facility: CLINIC | Age: 59
End: 2024-05-13
Payer: COMMERCIAL

## 2024-05-13 DIAGNOSIS — H61.23 BILATERAL IMPACTED CERUMEN: ICD-10-CM

## 2024-05-13 DIAGNOSIS — H61.21 HEARING LOSS DUE TO CERUMEN IMPACTION, RIGHT: Primary | ICD-10-CM

## 2024-05-13 PROCEDURE — 99999 PR PBB SHADOW E&M-EST. PATIENT-LVL III: CPT | Mod: PBBFAC,,, | Performed by: OTOLARYNGOLOGY

## 2024-05-13 PROCEDURE — 99203 OFFICE O/P NEW LOW 30 MIN: CPT | Mod: 25,S$GLB,, | Performed by: OTOLARYNGOLOGY

## 2024-05-13 PROCEDURE — 69210 REMOVE IMPACTED EAR WAX UNI: CPT | Mod: 50,S$GLB,, | Performed by: OTOLARYNGOLOGY

## 2024-05-13 PROCEDURE — 3072F LOW RISK FOR RETINOPATHY: CPT | Mod: CPTII,S$GLB,, | Performed by: OTOLARYNGOLOGY

## 2024-05-13 PROCEDURE — 3044F HG A1C LEVEL LT 7.0%: CPT | Mod: CPTII,S$GLB,, | Performed by: OTOLARYNGOLOGY

## 2024-05-13 PROCEDURE — 1159F MED LIST DOCD IN RCRD: CPT | Mod: CPTII,S$GLB,, | Performed by: OTOLARYNGOLOGY

## 2024-05-13 PROCEDURE — 1160F RVW MEDS BY RX/DR IN RCRD: CPT | Mod: CPTII,S$GLB,, | Performed by: OTOLARYNGOLOGY

## 2024-05-13 NOTE — PROGRESS NOTES
[facility]    Subjective:      Patient: Joanne Stallings Patient PCP: Alonso Phillips MD         :  1965     Sex:  female      MRN:  [id number]          Date of Visit: 2024      Chief Complaint: Cerumen Impaction (Patient denies pain or ear drainage )      Patient ID: Joanne Stallings is a 58 y.o. female     Patient is a  lifelong NON-smoker with a past medical history of a history of well-controlled diabetes with an A1c of 6% February on no anticoagulation with no prior history of ear or skin disease seen today referred to me by Dr. Alonso Phillips in consultation for wax impaction without pain or drainage.    Labs:  WBC   Date Value Ref Range Status   04/15/2024 6.81 3.90 - 12.70 K/uL Final     Hemoglobin   Date Value Ref Range Status   04/15/2024 12.8 12.0 - 16.0 g/dL Final     Platelets   Date Value Ref Range Status   04/15/2024 313 150 - 450 K/uL Final     Creatinine   Date Value Ref Range Status   04/15/2024 0.9 0.5 - 1.4 mg/dL Final     TSH   Date Value Ref Range Status   04/15/2023 1.535 0.400 - 4.000 uIU/mL Final     Hemoglobin A1C   Date Value Ref Range Status   2024 6.0 (H) 4.0 - 5.6 % Final     Comment:     ADA Screening Guidelines:  5.7-6.4%  Consistent with prediabetes  >or=6.5%  Consistent with diabetes    High levels of fetal hemoglobin interfere with the HbA1C  assay. Heterozygous hemoglobin variants (HbS, HgC, etc)do  not significantly interfere with this assay.   However, presence of multiple variants may affect accuracy.         Past Medical History  She has a past medical history of Chronic constipation, Depression, Diabetes mellitus, and Hyperlipidemia.    Family / Surgical / Social History  Her family history includes Arthritis in her father; Cancer in her father; Diabetes in her mother; Glaucoma in her maternal grandmother and mother; Hyperlipidemia in her mother; Hypertension in her mother; Kidney failure in her mother; Lung cancer in her father; No Known  "Problems in her brother, maternal aunt, maternal grandfather, maternal uncle, paternal aunt, paternal grandfather, paternal grandmother, paternal uncle, and sister.    Past Surgical History:   Procedure Laterality Date     SECTION      COLONOSCOPY N/A 2016    Procedure: COLONOSCOPY;  Surgeon: Angel Sawyer MD;  [facility]  Service: Endoscopy;  Laterality: N/A;    HYSTERECTOMY  2007    ALLIE, ovarires and cervix  remain (fibroids)    SURGICAL REMOVAL OF MASS OF AXILLA Right 10/20/2020    Procedure: EXCISION, MASS, AXILLARY;  Surgeon: Ted Baumann MD;  Location: [facility] OR;  Service: General;  Laterality: Right;       Social History     Tobacco Use    Smoking status: Never     Passive exposure: Never    Smokeless tobacco: Never   Substance and Sexual Activity    Alcohol use: Yes     Alcohol/week: 1.0 standard drink of alcohol     Types: 1 Glasses of wine per week     Comment: occasional use only/ not weekly    Drug use: No    Sexual activity: Yes     Partners: Male     Birth control/protection: None       Medications  She has a current medication list which includes the following prescription(s): blood sugar diagnostic, estradiol, ibuprofen, lancets, ozempic, true metrix glucose meter, trueplus lancets, and rosuvastatin.      Allergies  Review of patient's allergies indicates:   Allergen Reactions    Iodine Hives and Swelling    Shellfish containing products Hives and Swelling       All medications, allergies, and past history have been reviewed.    Objective:      Vitals:      4/15/2024     9:26 AM 2024     8:51 AM 2024     2:56 PM   Vitals - 1 value per visit   SYSTOLIC 150 143    DIASTOLIC 94 84    Pulse 76     Temp 97.8 °F (36.6 °C)     Resp 18     SPO2 100 %     Weight (lb) 145 151.68    Weight (kg) 65.772 68.8    Height 4' 9" (1.448 m)     BMI (Calculated) 31.4     Pain Score  Zero Zero       There is no height or weight on file to calculate BSA.    Physical " Exam:    GENERAL  APPEARANCE -  alert, appears stated age, and cooperative  BARRIER(S) TO COMMUNICATION -  none VOICE - appropriate for age and gender    INTEGUMENTARY  no suspicious head and neck lesions    HEENT  HEAD: Normocephalic, without obvious abnormality, atraumatic  FACE: INSPECTION - Symmetric, no signs of trauma, no suspicious lesion(s)      STRENGTH - facial symmetry intact     EYES  Normal occular alignment and mobility with no visible nystagmus at rest    EARS/NOSE/MOUTH/THROAT  EARS  PINNAE AND EXTERNAL EARS - no suspicious lesion OTOSCOPIC EXAM (surgical microscopy was used for visualization/instrumentation): EAR EXAM - right-greater-than-left dark hard wax partially removed with suctioned then curette without trauma revealing normal ear canals tympanic membranes and middle ear spaces.  HEARING - subjectively improve right ear hearing after disimpaction    NOSE AND SINUSES  EXTERNAL NOSE - Grossly normal for age/sex   MUCOSA - no drainage     CHEST AND LUNG   INSPECTION & AUSCULTATION - normal effort, no stridor    NEUROLOGIC  MENTAL STATUS - alert, interactive CRANIAL NERVES - normal        Procedure(s):  Cerumen removal performed.  See procedure note.          Assessment:      Problem List Items Addressed This Visit    None  Visit Diagnoses       Bilateral impacted cerumen                     Plan:      Routine ear care follow up as needed        Voice recognition software was used in the creation of this note/communication and any sound-alike errors which may have occurred from its use should be taken in context when interpreting.  If such errors prevent a clear understanding of the note/communication, please contact the office for clarification.

## 2024-05-13 NOTE — PROCEDURES
"Ear Cerumen Removal    Date/Time: 5/13/2024 2:40 PM    Performed by: Ryland Warren MD  Authorized by: Ryland Warren MD    Time out: Immediately prior to procedure a "time out" was called to verify the correct patient, procedure, equipment, support staff and site/side marked as required.    Consent Done?:  Yes (Verbal)    Local anesthetic:  None  Location details:  Both ears  Procedure type: curette    Cerumen  Removal Results:  Cerumen completely removed  Patient tolerance:  Patient tolerated the procedure well with no immediate complications     See note for details.     "

## 2024-05-18 ENCOUNTER — LAB VISIT (OUTPATIENT)
Dept: LAB | Facility: HOSPITAL | Age: 59
End: 2024-05-18
Attending: INTERNAL MEDICINE
Payer: COMMERCIAL

## 2024-05-18 DIAGNOSIS — I25.3 ATRIAL SEPTAL ANEURYSM: ICD-10-CM

## 2024-05-18 DIAGNOSIS — E78.00 PURE HYPERCHOLESTEROLEMIA: ICD-10-CM

## 2024-05-18 DIAGNOSIS — E11.9 TYPE 2 DIABETES MELLITUS WITHOUT COMPLICATION, WITHOUT LONG-TERM CURRENT USE OF INSULIN: ICD-10-CM

## 2024-05-18 DIAGNOSIS — R07.9 CHEST PAIN OF UNCERTAIN ETIOLOGY: ICD-10-CM

## 2024-05-18 DIAGNOSIS — R06.09 DYSPNEA ON EXERTION: ICD-10-CM

## 2024-05-18 LAB
ALBUMIN SERPL BCP-MCNC: 4 G/DL (ref 3.5–5.2)
ALP SERPL-CCNC: 73 U/L (ref 55–135)
ALT SERPL W/O P-5'-P-CCNC: 14 U/L (ref 10–44)
ANION GAP SERPL CALC-SCNC: 8 MMOL/L (ref 8–16)
AST SERPL-CCNC: 18 U/L (ref 10–40)
BILIRUB SERPL-MCNC: 0.3 MG/DL (ref 0.1–1)
BUN SERPL-MCNC: 11 MG/DL (ref 6–20)
CALCIUM SERPL-MCNC: 9.9 MG/DL (ref 8.7–10.5)
CHLORIDE SERPL-SCNC: 106 MMOL/L (ref 95–110)
CHOLEST SERPL-MCNC: 255 MG/DL (ref 120–199)
CHOLEST/HDLC SERPL: 4.8 {RATIO} (ref 2–5)
CK SERPL-CCNC: 101 U/L (ref 20–180)
CO2 SERPL-SCNC: 26 MMOL/L (ref 23–29)
CREAT SERPL-MCNC: 0.8 MG/DL (ref 0.5–1.4)
EST. GFR  (NO RACE VARIABLE): >60 ML/MIN/1.73 M^2
GLUCOSE SERPL-MCNC: 97 MG/DL (ref 70–110)
HDLC SERPL-MCNC: 53 MG/DL (ref 40–75)
HDLC SERPL: 20.8 % (ref 20–50)
LDLC SERPL CALC-MCNC: 186.6 MG/DL (ref 63–159)
NONHDLC SERPL-MCNC: 202 MG/DL
POTASSIUM SERPL-SCNC: 4.7 MMOL/L (ref 3.5–5.1)
PROT SERPL-MCNC: 7.8 G/DL (ref 6–8.4)
SODIUM SERPL-SCNC: 140 MMOL/L (ref 136–145)
TRIGL SERPL-MCNC: 77 MG/DL (ref 30–150)

## 2024-05-18 PROCEDURE — 80061 LIPID PANEL: CPT | Performed by: INTERNAL MEDICINE

## 2024-05-18 PROCEDURE — 82550 ASSAY OF CK (CPK): CPT | Performed by: INTERNAL MEDICINE

## 2024-05-18 PROCEDURE — 80053 COMPREHEN METABOLIC PANEL: CPT | Performed by: INTERNAL MEDICINE

## 2024-05-18 PROCEDURE — 36415 COLL VENOUS BLD VENIPUNCTURE: CPT | Mod: PO | Performed by: INTERNAL MEDICINE

## 2024-05-22 ENCOUNTER — OFFICE VISIT (OUTPATIENT)
Dept: CARDIOLOGY | Facility: CLINIC | Age: 59
End: 2024-05-22
Payer: COMMERCIAL

## 2024-05-22 VITALS
DIASTOLIC BLOOD PRESSURE: 75 MMHG | OXYGEN SATURATION: 95 % | SYSTOLIC BLOOD PRESSURE: 131 MMHG | HEART RATE: 73 BPM | BODY MASS INDEX: 30.83 KG/M2 | HEIGHT: 57 IN | WEIGHT: 142.88 LBS

## 2024-05-22 DIAGNOSIS — I25.3 ATRIAL SEPTAL ANEURYSM: ICD-10-CM

## 2024-05-22 DIAGNOSIS — E78.5 HYPERLIPIDEMIA, UNSPECIFIED HYPERLIPIDEMIA TYPE: Primary | ICD-10-CM

## 2024-05-22 PROCEDURE — 3044F HG A1C LEVEL LT 7.0%: CPT | Mod: CPTII,S$GLB,, | Performed by: INTERNAL MEDICINE

## 2024-05-22 PROCEDURE — 3072F LOW RISK FOR RETINOPATHY: CPT | Mod: CPTII,S$GLB,, | Performed by: INTERNAL MEDICINE

## 2024-05-22 PROCEDURE — 3078F DIAST BP <80 MM HG: CPT | Mod: CPTII,S$GLB,, | Performed by: INTERNAL MEDICINE

## 2024-05-22 PROCEDURE — 3008F BODY MASS INDEX DOCD: CPT | Mod: CPTII,S$GLB,, | Performed by: INTERNAL MEDICINE

## 2024-05-22 PROCEDURE — 1159F MED LIST DOCD IN RCRD: CPT | Mod: CPTII,S$GLB,, | Performed by: INTERNAL MEDICINE

## 2024-05-22 PROCEDURE — 1160F RVW MEDS BY RX/DR IN RCRD: CPT | Mod: CPTII,S$GLB,, | Performed by: INTERNAL MEDICINE

## 2024-05-22 PROCEDURE — 99999 PR PBB SHADOW E&M-EST. PATIENT-LVL III: CPT | Mod: PBBFAC,,, | Performed by: INTERNAL MEDICINE

## 2024-05-22 PROCEDURE — 99213 OFFICE O/P EST LOW 20 MIN: CPT | Mod: S$GLB,,, | Performed by: INTERNAL MEDICINE

## 2024-05-22 PROCEDURE — 3075F SYST BP GE 130 - 139MM HG: CPT | Mod: CPTII,S$GLB,, | Performed by: INTERNAL MEDICINE

## 2024-05-22 RX ORDER — ROSUVASTATIN CALCIUM 40 MG/1
40 TABLET, COATED ORAL NIGHTLY
Qty: 90 TABLET | Refills: 3 | Status: SHIPPED | OUTPATIENT
Start: 2024-05-22 | End: 2025-05-22

## 2024-05-22 NOTE — PROGRESS NOTES
Subjective:      Patient ID: Joanne Stallings is a 58 y.o. female.    Chief Complaint: Follow-up    HPI:  Teaches at elementary school.    Review of Systems   Cardiovascular:  Negative for chest pain, claudication, dyspnea on exertion, irregular heartbeat, leg swelling, near-syncope, orthopnea, palpitations and syncope.        Past Medical History:   Diagnosis Date    Chronic constipation     Depression     Diabetes mellitus     Hyperlipidemia         Past Surgical History:   Procedure Laterality Date     SECTION      COLONOSCOPY N/A 2016    Procedure: COLONOSCOPY;  Surgeon: Angel Sawyer MD;  [facility] ([address] [place] [address]);  Service: Endoscopy;  Laterality: N/A;    HYSTERECTOMY      ALLIE, ovarires and cervix  remain (fibroids)    SURGICAL REMOVAL OF MASS OF AXILLA Right 10/20/2020    Procedure: EXCISION, MASS, AXILLARY;  Surgeon: Ted Baumann MD;  Location: [facility];  Service: General;  Laterality: Right;       Family History   Problem Relation Name Age of Onset    Diabetes Mother Mom     Hypertension Mother Mom     Hyperlipidemia Mother Mom     Glaucoma Mother Mom     Kidney failure Mother Mom     Lung cancer Father Dad         lung    Arthritis Father Dad     Cancer Father Dad     No Known Problems Sister      No Known Problems Brother      No Known Problems Maternal Aunt      No Known Problems Maternal Uncle      No Known Problems Paternal Aunt      No Known Problems Paternal Uncle      Glaucoma Maternal Grandmother      No Known Problems Maternal Grandfather      No Known Problems Paternal Grandmother      No Known Problems Paternal Grandfather      Breast cancer Neg Hx      Colon cancer Neg Hx      Ovarian cancer Neg Hx      Cataracts Neg Hx      Macular degeneration Neg Hx      Retinal detachment Neg Hx      Strabismus Neg Hx      Blindness Neg Hx      Amblyopia Neg Hx         Social History     Socioeconomic History    Marital status: Single   Tobacco Use    Smoking status: Never      Passive exposure: Never    Smokeless tobacco: Never   Substance and Sexual Activity    Alcohol use: Yes     Alcohol/week: 1.0 standard drink of alcohol     Types: 1 Glasses of wine per week     Comment: occasional use only/ not weekly    Drug use: No    Sexual activity: Yes     Partners: Male     Birth control/protection: None   Social History Narrative    Work:      Family: Patient is  2 children.     Habits: Nonsmoker, occasional etoh    Diet/Exercise:     Other:         Social Determinants of Health     Financial Resource Strain: Low Risk  (9/30/2021)    Overall Financial Resource Strain (CARDIA)     Difficulty of Paying Living Expenses: Not hard at all   Food Insecurity: No Food Insecurity (9/30/2021)    Hunger Vital Sign     Worried About Running Out of Food in the Last Year: Never true     Ran Out of Food in the Last Year: Never true   Transportation Needs: No Transportation Needs (9/30/2021)    PRAPARE - Transportation     Lack of Transportation (Medical): No     Lack of Transportation (Non-Medical): No   Physical Activity: Inactive (12/14/2021)    Exercise Vital Sign     Days of Exercise per Week: 0 days     Minutes of Exercise per Session: 0 min   Stress: No Stress Concern Present (12/14/2021)    [employer] [place] - Occupational Stress Questionnaire     Feeling of Stress : Only a little   Recent Concern: Stress - Stress Concern Present (9/30/2021)    [employer] - Occupational Stress Questionnaire     Feeling of Stress : To some extent   Housing Stability: Low Risk  (12/14/2021)    Housing Stability Vital Sign     Unable to Pay for Housing in the Last Year: No     Number of Places Lived in the Last Year: 1     Unstable Housing in the Last Year: No       Current Outpatient Medications on File Prior to Visit   Medication Sig Dispense Refill    blood sugar diagnostic Strp To check BG once daily, to use with insurance preferred meter 100 strip 3     "estradioL (ESTRACE) 0.01 % (0.1 mg/gram) vaginal cream Place 1 gram vaginally nightly x 2 weeks then 1 gram twice weekly 42.5 g 2    ibuprofen (ADVIL,MOTRIN) 800 MG tablet Take 1 tablet (800 mg total) by mouth every 6 (six) hours as needed for Pain. 20 tablet 0    lancets Misc 1 each by Misc.(Non-Drug; Combo Route) route once daily. 50 each 11    semaglutide (OZEMPIC) 1 mg/dose (4 mg/3 mL) Inject 1 mg into the skin every 7 days. 3 mL 11    TRUE METRIX GLUCOSE METER Misc USE TO CHECK BLOOD GLUCOSE ONE TIME DAILY      TRUEPLUS LANCETS 30 gauge Misc Use      [DISCONTINUED] rosuvastatin (CRESTOR) 20 MG tablet Take 1 tablet (20 mg total) by mouth every evening. 90 tablet 3     No current facility-administered medications on file prior to visit.       Review of patient's allergies indicates:   Allergen Reactions    Iodine Hives and Swelling    Shellfish containing products Hives and Swelling     Objective:     Vitals:    05/22/24 1451   BP: 131/75   BP Location: Left arm   Patient Position: Sitting   BP Method: Large (Automatic)   Pulse: 73   SpO2: 95%   Weight: 64.8 kg (142 lb 13.7 oz)   Height: 4' 9" (1.448 m)        Physical Exam  Constitutional:       Appearance: She is well-developed.   Eyes:      General: No scleral icterus.  Neck:      Vascular: No carotid bruit or JVD.   Cardiovascular:      Rate and Rhythm: Normal rate and regular rhythm.      Heart sounds: No murmur heard.     No gallop.   Pulmonary:      Breath sounds: Normal breath sounds.   Musculoskeletal:      Right lower leg: No edema.      Left lower leg: No edema.   Skin:     General: Skin is warm and dry.   Neurological:      Mental Status: She is alert and oriented to person, place, and time.   Psychiatric:         Behavior: Behavior normal.         Thought Content: Thought content normal.         Judgment: Judgment normal.              Lab Visit on 05/18/2024   Component Date Value Ref Range Status    CPK 05/18/2024 101  20 - 180 U/L Final    Sodium " 05/18/2024 140  136 - 145 mmol/L Final    Potassium 05/18/2024 4.7  3.5 - 5.1 mmol/L Final    Chloride 05/18/2024 106  95 - 110 mmol/L Final    CO2 05/18/2024 26  23 - 29 mmol/L Final    Glucose 05/18/2024 97  70 - 110 mg/dL Final    BUN 05/18/2024 11  6 - 20 mg/dL Final    Creatinine 05/18/2024 0.8  0.5 - 1.4 mg/dL Final    Calcium 05/18/2024 9.9  8.7 - 10.5 mg/dL Final    Total Protein 05/18/2024 7.8  6.0 - 8.4 g/dL Final    Albumin 05/18/2024 4.0  3.5 - 5.2 g/dL Final    Total Bilirubin 05/18/2024 0.3  0.1 - 1.0 mg/dL Final    Alkaline Phosphatase 05/18/2024 73  55 - 135 U/L Final    AST 05/18/2024 18  10 - 40 U/L Final    ALT 05/18/2024 14  10 - 44 U/L Final    eGFR 05/18/2024 >60.0  >60 mL/min/1.73 m^2 Final    Anion Gap 05/18/2024 8  8 - 16 mmol/L Final    Cholesterol 05/18/2024 255 (H)  120 - 199 mg/dL Final    Triglycerides 05/18/2024 77  30 - 150 mg/dL Final    HDL 05/18/2024 53  40 - 75 mg/dL Final    LDL Cholesterol 05/18/2024 186.6 (H)  63.0 - 159.0 mg/dL Final    HDL/Cholesterol Ratio 05/18/2024 20.8  20.0 - 50.0 % Final    Total Cholesterol/HDL Ratio 05/18/2024 4.8  2.0 - 5.0 Final    Non-HDL Cholesterol 05/18/2024 202  mg/dL Final   Lab Visit on 04/17/2024   Component Date Value Ref Range Status     04/17/2024 7  0 - 30 U/mL Final   Admission on 04/15/2024, Discharged on 04/15/2024   Component Date Value Ref Range Status    Specimen UA 04/15/2024 Urine, Clean Catch   Final    Color, UA 04/15/2024 Yellow  Yellow, Straw, Asha Final    Appearance, UA 04/15/2024 Clear  Clear Final    pH, UA 04/15/2024 6.0  5.0 - 8.0 Final    Specific Gravity, UA 04/15/2024 1.025  1.005 - 1.030 Final    Protein, UA 04/15/2024 Negative  Negative Final    Glucose, UA 04/15/2024 Negative  Negative Final    Ketones, UA 04/15/2024 Negative  Negative Final    Bilirubin (UA) 04/15/2024 Negative  Negative Final    Occult Blood UA 04/15/2024 Trace (A)  Negative Final    Nitrite, UA 04/15/2024 Negative  Negative Final     Urobilinogen, UA 04/15/2024 Negative  <2.0 EU/dL Final    Leukocytes, UA 04/15/2024 Negative  Negative Final    Sodium 04/15/2024 141  136 - 145 mmol/L Final    Potassium 04/15/2024 4.2  3.5 - 5.1 mmol/L Final    Chloride 04/15/2024 109  95 - 110 mmol/L Final    CO2 04/15/2024 24  23 - 29 mmol/L Final    Glucose 04/15/2024 97  70 - 110 mg/dL Final    BUN 04/15/2024 11  6 - 20 mg/dL Final    Creatinine 04/15/2024 0.9  0.5 - 1.4 mg/dL Final    Calcium 04/15/2024 9.3  8.7 - 10.5 mg/dL Final    Total Protein 04/15/2024 7.7  6.0 - 8.4 g/dL Final    Albumin 04/15/2024 3.8  3.5 - 5.2 g/dL Final    Total Bilirubin 04/15/2024 0.1  0.1 - 1.0 mg/dL Final    Alkaline Phosphatase 04/15/2024 73  55 - 135 U/L Final    AST 04/15/2024 20  10 - 40 U/L Final    ALT 04/15/2024 14  10 - 44 U/L Final    eGFR 04/15/2024 >60  >60 mL/min/1.73 m^2 Final    Anion Gap 04/15/2024 8  8 - 16 mmol/L Final    WBC 04/15/2024 6.81  3.90 - 12.70 K/uL Final    RBC 04/15/2024 4.51  4.00 - 5.40 M/uL Final    Hemoglobin 04/15/2024 12.8  12.0 - 16.0 g/dL Final    Hematocrit 04/15/2024 40.1  37.0 - 48.5 % Final    MCV 04/15/2024 89  82 - 98 fL Final    MCH 04/15/2024 28.4  27.0 - 31.0 pg Final    MCHC 04/15/2024 31.9 (L)  32.0 - 36.0 g/dL Final    RDW 04/15/2024 13.1  11.5 - 14.5 % Final    Platelets 04/15/2024 313  150 - 450 K/uL Final    MPV 04/15/2024 10.0  9.2 - 12.9 fL Final    Immature Granulocytes 04/15/2024 0.3  0.0 - 0.5 % Final    Gran # (ANC) 04/15/2024 4.7  1.8 - 7.7 K/uL Final    Immature Grans (Abs) 04/15/2024 0.02  0.00 - 0.04 K/uL Final    Lymph # 04/15/2024 1.5  1.0 - 4.8 K/uL Final    Mono # 04/15/2024 0.4  0.3 - 1.0 K/uL Final    Eos # 04/15/2024 0.1  0.0 - 0.5 K/uL Final    Baso # 04/15/2024 0.02  0.00 - 0.20 K/uL Final    nRBC 04/15/2024 0  0 /100 WBC Final    Gran % 04/15/2024 68.9  38.0 - 73.0 % Final    Lymph % 04/15/2024 22.2  18.0 - 48.0 % Final    Mono % 04/15/2024 6.5  4.0 - 15.0 % Final    Eosinophil % 04/15/2024 1.8  0.0 - 8.0  % Final    Basophil % 04/15/2024 0.3  0.0 - 1.9 % Final    Differential Method 04/15/2024 Automated   Final    Lipase 04/15/2024 46  4 - 60 U/L Final   Hospital Outpatient Visit on 03/28/2024   Component Date Value Ref Range Status    85% Max Predicted HR 03/28/2024 138   Final    Max Predicted HR 03/28/2024 162   Final    [facility] CV CPX PATIENT IS MALE 03/28/2024 0.0   Final    [facility] CV CPX PATIENT IS FEMALE 03/28/2024 1.0   Final    HR at rest 03/28/2024 74  bpm Final    Systolic blood pressure 03/28/2024 139  mmHg Final    Diastolic blood pressure 03/28/2024 76  mmHg Final    RPP 03/28/2024 10,286   Final    Exercise duration (min) 03/28/2024 6  minutes Final    Exercise duration (sec) 03/28/2024 12  seconds Final    Peak HR 03/28/2024 148  bpm Final    Peak Systolic BP 03/28/2024 203  mmHg Final    Peak Diatolic BP 03/28/2024 98  mmHg Final    Peak RPP 03/28/2024 30,044   Final    Estimated METs 03/28/2024 7   Final    % Max HR Achieved 03/28/2024 96   Final    1 Minute Recovery HR 03/28/2024 107  bpm Final    EF 03/28/2024 70  % Final    ST Depression (mm) 03/28/2024 1.0  mm Final   Office Visit on 03/13/2024   Component Date Value Ref Range Status    QRS Duration 03/13/2024 82  ms Final    [facility] QTC Calculation 03/13/2024 429  ms Final   Hospital Outpatient Visit on 03/06/2024   Component Date Value Ref Range Status    RA Width 03/06/2024 2.30  cm Final    LA volume (mod) 03/06/2024 38.67  cm3 Final    Left Atrium Major Axis 03/06/2024 4.75  cm Final    Left Atrium Minor Axis 03/06/2024 4.86  cm Final    RA Major Axis 03/06/2024 4.61  cm Final    LV Diastolic Volume 03/06/2024 57.54  mL Final    LV Systolic Volume 03/06/2024 14.90  mL Final    PV Peak D Gulshan 03/06/2024 0.30  m/s Final    PV Peak S Gulshan 03/06/2024 0.49  m/s Final    MV Peak A Gulshan 03/06/2024 1.09  m/s Final    MV stenosis pressure 1/2 time 03/06/2024 61.10  ms Final    MV Peak E Gulshan 03/06/2024 0.97  m/s Final    Ao VTI 03/06/2024 29.32  cm Final    Ao  "peak ashley 03/06/2024 1.29  m/s Final    LVOT peak VTI 03/06/2024 24.72  cm Final    LVOT peak ashley 03/06/2024 1.26  m/s Final    LVOT diameter 03/06/2024 1.71  cm Final    MV "A" wave duration 03/06/2024 11.13  msec Final    E wave deceleration time 03/06/2024 210.67  msec Final    AV mean gradient 03/06/2024 4  mmHg Final    TAPSE 03/06/2024 2.05  cm Final    RVDD 03/06/2024 2.21  cm Final    LA size 03/06/2024 3.04  cm Final    Ascending aorta 03/06/2024 2.79  cm Final    STJ 03/06/2024 2.26  cm Final    Sinus 03/06/2024 2.54  cm Final    LVIDs 03/06/2024 2.13  2.1 - 4.0 cm Final    Posterior Wall 03/06/2024 0.68  0.6 - 1.1 cm Final    IVS 03/06/2024 0.60  0.6 - 1.1 cm Final    LVIDd 03/06/2024 3.68  3.5 - 6.0 cm Final    PV PEAK VELOCITY 03/06/2024 1.22  m/s Final    TDI LATERAL 03/06/2024 0.06  m/s Final    LA WIDTH 03/06/2024 3.56  cm Final    TDI SEPTAL 03/06/2024 0.05  m/s Final    LV LATERAL E/E' RATIO 03/06/2024 16.17  m/s Final    LV SEPTAL E/E' RATIO 03/06/2024 19.40  m/s Final    FS 03/06/2024 42  28 - 44 % Final    LA volume 03/06/2024 44.20  cm3 Final    LV mass 03/06/2024 60.66  g Final    Left Ventricle Relative Wall Thick* 03/06/2024 0.37  cm Final    AV valve area 03/06/2024 1.94  cm² Final    AV Velocity Ratio 03/06/2024 0.98   Final    AV index (prosthetic) 03/06/2024 0.84   Final    MV valve area p 1/2 method 03/06/2024 3.60  cm2 Final    PV peak gradient 03/06/2024 6  mmHg Final    E/A ratio 03/06/2024 0.89   Final    Mean e' 03/06/2024 0.06  m/s Final    Pulm vein S/D ratio 03/06/2024 1.63   Final    LVOT area 03/06/2024 2.3  cm2 Final    LVOT stroke volume 03/06/2024 56.74  cm3 Final    AV peak gradient 03/06/2024 7  mmHg Final    E/E' ratio 03/06/2024 17.64  m/s Final    TANGELA by Velocity Ratio 03/06/2024 2.24  cm² Final    BSA 03/06/2024 1.64  m2 Final    LV Systolic Volume Index 03/06/2024 9.4  mL/m2 Final    LV Diastolic Volume Index 03/06/2024 36.42  mL/m2 Final    LV Mass Index 03/06/2024 " 38  g/m2 Final    LA Volume Index 03/06/2024 28.0  mL/m2 Final    LA Volume Index (Mod) 03/06/2024 24.5  mL/m2 Final    ZLVIDS 03/06/2024 -2.18   Final    ZLVIDD 03/06/2024 -2.07   Final    Est. RA pres 03/06/2024 3  mmHg Final   Lab Visit on 02/24/2024   Component Date Value Ref Range Status    Sodium 02/24/2024 139  136 - 145 mmol/L Final    Potassium 02/24/2024 5.4 (H)  3.5 - 5.1 mmol/L Final    Chloride 02/24/2024 108  95 - 110 mmol/L Final    CO2 02/24/2024 24  23 - 29 mmol/L Final    Glucose 02/24/2024 126 (H)  70 - 110 mg/dL Final    BUN 02/24/2024 18  6 - 20 mg/dL Final    Creatinine 02/24/2024 1.0  0.5 - 1.4 mg/dL Final    Calcium 02/24/2024 9.7  8.7 - 10.5 mg/dL Final    Total Protein 02/24/2024 7.5  6.0 - 8.4 g/dL Final    Albumin 02/24/2024 3.7  3.5 - 5.2 g/dL Final    Total Bilirubin 02/24/2024 0.3  0.1 - 1.0 mg/dL Final    Alkaline Phosphatase 02/24/2024 77  55 - 135 U/L Final    AST 02/24/2024 18  10 - 40 U/L Final    ALT 02/24/2024 15  10 - 44 U/L Final    eGFR 02/24/2024 >60.0  >60 mL/min/1.73 m^2 Final    Anion Gap 02/24/2024 7 (L)  8 - 16 mmol/L Final    Cholesterol 02/24/2024 273 (H)  120 - 199 mg/dL Final    Triglycerides 02/24/2024 87  30 - 150 mg/dL Final    HDL 02/24/2024 54  40 - 75 mg/dL Final    LDL Cholesterol 02/24/2024 201.6 (H)  63.0 - 159.0 mg/dL Final    HDL/Cholesterol Ratio 02/24/2024 19.8 (L)  20.0 - 50.0 % Final    Total Cholesterol/HDL Ratio 02/24/2024 5.1 (H)  2.0 - 5.0 Final    Non-HDL Cholesterol 02/24/2024 219  mg/dL Final    WBC 02/24/2024 4.80  3.90 - 12.70 K/uL Final    RBC 02/24/2024 4.56  4.00 - 5.40 M/uL Final    Hemoglobin 02/24/2024 12.9  12.0 - 16.0 g/dL Final    Hematocrit 02/24/2024 42.0  37.0 - 48.5 % Final    MCV 02/24/2024 92  82 - 98 fL Final    MCH 02/24/2024 28.3  27.0 - 31.0 pg Final    MCHC 02/24/2024 30.7 (L)  32.0 - 36.0 g/dL Final    RDW 02/24/2024 14.4  11.5 - 14.5 % Final    Platelets 02/24/2024 340  150 - 450 K/uL Final    MPV 02/24/2024 10.9  9.2 -  12.9 fL Final    Immature Granulocytes 02/24/2024 0.2  0.0 - 0.5 % Final    Gran # (ANC) 02/24/2024 2.7  1.8 - 7.7 K/uL Final    Immature Grans (Abs) 02/24/2024 0.01  0.00 - 0.04 K/uL Final    Lymph # 02/24/2024 1.5  1.0 - 4.8 K/uL Final    Mono # 02/24/2024 0.4  0.3 - 1.0 K/uL Final    Eos # 02/24/2024 0.1  0.0 - 0.5 K/uL Final    Baso # 02/24/2024 0.05  0.00 - 0.20 K/uL Final    nRBC 02/24/2024 0  0 /100 WBC Final    Gran % 02/24/2024 57.2  38.0 - 73.0 % Final    Lymph % 02/24/2024 30.8  18.0 - 48.0 % Final    Mono % 02/24/2024 8.5  4.0 - 15.0 % Final    Eosinophil % 02/24/2024 2.3  0.0 - 8.0 % Final    Basophil % 02/24/2024 1.0  0.0 - 1.9 % Final    Differential Method 02/24/2024 Automated   Final    Hemoglobin A1C 02/24/2024 6.0 (H)  4.0 - 5.6 % Final    Estimated Avg Glucose 02/24/2024 126  68 - 131 mg/dL Final   Office Visit on 12/17/2023   Component Date Value Ref Range Status    SARS Coronavirus 2 Antigen 12/17/2023 Negative  Negative Final     Acceptable 12/17/2023 Yes   Final    POC Molecular Influenza A Ag 12/17/2023 Positive (A)  Negative, Not Reported Final    POC Molecular Influenza B Ag 12/17/2023 Negative  Negative, Not Reported Final     Acceptable 12/17/2023 Yes   Final   (         Contains abnormal data Hemoglobin A1C  Order: [id number]  Status: Final result     Visible to patient: Yes (seen)     Next appt: 06/29/2024 at 08:30 AM in Lab ([facility], [facility])     Dx: Other hyperlipidemia; Type 2 diabetes...     0 Result Notes    1 HM Topic             Component Ref Range & Units 2 mo ago 9 mo ago 1 yr ago 2 yr ago 3 yr ago 4 yr ago 5 yr ago   Hemoglobin A1C 4.0 - 5.6 % 6.0 High   6.1 High  CM  6.9 High  CM  7.0 High  CM  6.7 High  CM  6.7 High  CM  6.7 High  CM    Comment: ADA Screening Guidelines:   5.7-6.4%  Consistent with prediabetes   >or=6.5%  Consistent with diabetes     High levels of fetal hemoglobin interfere with the HbA1C   assay. Heterozygous hemoglobin variants  (HbS, HgC, etc)do   not significantly interfere with this assay.   However, presence of multiple variants may affect accuracy.    Estimated Avg Glucose 68 - 131 mg/dL 126  128  151 High   154 High   146 High   146 High   146 High     Resulting Agency  [facility]              Specimen Collected: 02/24/24 08:46 CST Last Resulted: 02/24/24 17:18 CST         X-Ray Chest PA And Lateral  Status: Final result     [facility] Results Release    [facility] Status: Active  Results Release     PACS Images for [employer] Viewer     Show images for X-Ray Chest PA And Lateral  All Reviewers List    Alonso Phillips MD on 3/7/2024 18:09     X-Ray Chest PA And Lateral  Order: [id number]  Status: Final result     Visible to patient: Yes (seen)     Next appt: 05/13/2024 at 02:40 PM in Otolaryngology (Ryland Warren MD)     Dx: SOB (shortness of breath) on exertion     0 Result Notes  Details    Reading Physician Reading Date Result Priority   Néstor Biswas MD  [phone]  [phone] 2/29/2024 Routine     Narrative & Impression  EXAMINATION:  XR CHEST PA AND LATERAL     CLINICAL HISTORY:  Shortness of breath     TECHNIQUE:  PA and lateral views of the chest were performed.     COMPARISON:  No 08/11/2020 ne     FINDINGS:  Heart size normal.  The lungs are clear.  No pleural effusion     Impression:     See above        Electronically signed by: Néstor Biswas MD  Date:                                            02/29/2024  Time:                                           08:45           Exam Ended: 02/28/24 15:32 CST         Result Image Hyperlink     Show images for Lipid Panel  Results   Lipid Panel (Acc# [id number]) (Order [id number])  Reviewed By    Alonso Phillips MD on 3/7/2024 18:09   Alonso Phillips MD on 2/26/2024 16:29    [facility] Results Release    [employer] Status: Active  Results Release       Contains abnormal data Lipid Panel  Order: [id number]  Status: Final result     Visible to  patient: Yes (seen)     Next appt: 05/13/2024 at 02:40 PM in Otolaryngology (Ryland Warren MD)     Dx: Other hyperlipidemia; Type 2 diabetes...     0 Result Notes    1 HM Topic             Component Ref Range & Units 2 wk ago 7 mo ago 11 mo ago 2 yr ago 3 yr ago 4 yr ago 5 yr ago   Cholesterol 120 - 199 mg/dL 273 High   284 High  CM  300 High  CM  265 High  CM  266 High  CM  291 High  CM  275 High  CM    Comment: The National Cholesterol Education Program (NCEP) has set the   following guidelines (reference ranges) for Cholesterol:   Optimal.....................<200 mg/dL   Borderline High.............200-239 mg/dL   High........................> or = 240 mg/dL    Triglycerides 30 - 150 mg/dL 87  102 CM  103 CM  83 CM  84 CM  95 CM  98 CM    Comment: The National Cholesterol Education Program (NCEP) has set the   following guidelines (reference values) for triglycerides:   Normal......................<150 mg/dL   Borderline High.............150-199 mg/dL   High........................200-499 mg/dL    HDL 40 - 75 mg/dL 54  48 CM  52 CM  43 CM  54 CM  57 CM  55 CM    Comment: The National Cholesterol Education Program (NCEP) has set the   following guidelines (reference values) for HDL Cholesterol:   Low...............<40 mg/dL   Optimal...........>60 mg/dL    LDL Cholesterol 63.0 - 159.0 mg/dL 201.6 High   215.6 High  CM  227.4 High  CM  205.4 High  CM  195.2 High  CM  215.0 High  CM  200.4 High  CM    Comment: The National Cholesterol Education Program (NCEP) has set the   following guidelines (reference values) for LDL Cholesterol:   Optimal.......................<130 mg/dL   Borderline High...............130-159 mg/dL   High..........................160-189 mg/dL   Very High.....................>190 mg/dL    HDL/Cholesterol Ratio 20.0 - 50.0 % 19.8 Low   16.9 Low   17.3 Low   16.2 Low   20.3  19.6 Low   20.0    Total Cholesterol/HDL Ratio 2.0 - 5.0 5.1 High   5.9 High   5.8 High   6.2 High   4.9  5.1 High    "5.0    Non-HDL Cholesterol mg/dL 219  236 CM  248 CM  222 CM  212 CM  234 CM  220 CM    Comment: Risk category and Non-HDL cholesterol goals:   Coronary heart disease (CHD)or equivalent (10-year risk of CHD >20%):   Non-HDL cholesterol goal     <130 mg/dL   Two or more CHD risk factors and 10-year risk of CHD <= 20%:   Non-HDL cholesterol goal     <160 mg/dL   0 to 1 CHD risk factor:   Non-HDL cholesterol goal     <190 mg/dL    Resulting Agency  [facility]                 Accession #: [id number]  Exercise stress echo    Height:  4' 9" (1.448 m)   Weight:  71.8 kg (158 lb 4.6 oz)   Blood Pressure:  126/62    Date of Study:  12/22/15   Ordering Provider:  Alonso Phillips MD   Clinical Indications:  Hyperlipidemia [E78.5 (ICD-10-CM)], SOB (shortness of breath) on exertion [R06.02 (ICD-10-CM)]       Interpreting Physicians  Performing Staff   Result is not signed. No performing staff assigned to study.     Reason for Exam  Priority: Routine  Dx: Hyperlipidemia [E78.5 (ICD-10-CM)]; SOB (shortness of breath) on exertion [R06.02 (ICD-10-CM)]      Contains abnormal data Exercise stress echo  Order: [id number]  Status: Final result     Visible to patient: Yes (seen)     Next appt: 05/13/2024 at 02:40 PM in Otolaryngology (Ryland Warren MD)     Dx: Hyperlipidemia; SOB (shortness of lila...     0 Result Notes    1 Patient Communication       Component Ref Range & Units 8 yr ago   EF + QEF 55 - 65 60    Diastolic Dysfunction  Yes Abnormal     Mitral Valve Mobility  NORMAL    Resulting Agency  [facility]              Narrative  Performed by: [facility]  PRE-TEST DATA   EKG: Resting electrocardiogram reveals normal sinus rhythm at a rate of 76 bpm.     TEST DESCRIPTION   The patient exercised for 6.53 minutes on a High Ramp protocol, corresponding to a functional capacity of 11 estimated METS, achieving a peak heart rate of 146 bpm, which is 90% of the age predicted maximum heart rate. The patient discontinued " exercise   secondary to fatigue.     There were no significant electrocardiographic changes throughout the protocol suggesting ischemia.     EKG Conclusions:     1. The EKG portion of this study is negative for ischemia at a moderate workload, and peak heart rate of 146 bpm (90% of predicted).   2. Exercise capacity is average.   3. Blood pressure response to exercise was normal (Presenting BP: 106/57 Peak BP: 144/58).   4. No significant arrhythmias were present.   5. There were no symptoms of chest discomfort or significant dyspnea throughout the protocol.   6. The Duke treadmill score was 7 suggesting a low probability for future cardiovascular events.     Echocardiographic Description:       Aorta: The aortic root is normal in size, measuring 2.2 cm at sinotubular junction and 2.4 cm at Sinuses of Valsalva. The proximal ascending aorta is normal in size, measuring 2.6 cm across.     Left Atrium: The left atrial volume index is normal, measuring 27.82 cc/m2.     Left Ventricle: The left ventricle is normal in size, with an end-diastolic diameter of 3.9 cm, and an end-systolic diameter of 2.7 cm. LV wall thickness is normal, with the septum and the posterior wall each measuring 0.7 cm across. Relative wall   thickness was normal at 0.36, and the LV mass index was 48.8 g/m2 consistent with normal left ventricular mass. Global left ventricular systolic function appears normal. Visually estimated ejection fraction is 60-65%. The LV Doppler derived stroke volume    equals 56.0 ccs.   There is normal systolic/diastolic flow in the pulmonary vein indicating normal left atrial pressures. The E/e'(lat) is 13, consistent with diastolic dysfunction secondary to relaxation abnormality.     Right Atrium: The right atrium is normal in size, measuring 4.2 cm in length and 3.0 cm in width in the apical view.     Right Ventricle: The right ventricle is normal in size measuring 2.8 cm at the base in the apical right  "ventricle-focused view. Global right ventricular systolic function appears normal. Tricuspid annular plane systolic excursion (TAPSE) is 2.0 cm.     Aortic Valve:  The aortic valve is normal in structure with normal leaflet mobility.     Mitral Valve:  The mitral valve is normal in structure with normal leaflet mobility.     Tricuspid Valve:  The tricuspid valve is normal in structure with normal leaflet mobility.     Intracavitary: There is no evidence of pericardial effusion, intracavity mass, thrombi, or vegetation.     Post Exercise Imaging:     Immediate post exercise images demonstrate left ventricular function augmenting. Right ventricular function augments. Left ventricular end systolic volume decreases.     The left ventricle is globally hyperdynamic and no exercise induced wall motion abnormalities were identified.       CONCLUSIONS     1 - Normal left ventricular systolic function (EF 60-65%).     2 - Left ventricular diastolic dysfunction.     3 - Normal right ventricular systolic function .     No evidence of stress induced myocardial ischemia.     This document has been electronically    SIGNED BY: Angeline Mendoza MD On: 12/22/2015 19:01   This result has not been signed. Information might be incomplete.      Specimen Collected: 12/22/15 11:15 CST Last Resulted: 12/22/15 19:09 CST            Transthoracic echo (TTE) complete    Height:  4' 9" (1.448 m)   Weight:  67 kg (147 lb 11.3 oz)   Blood Pressure:  140/80    Date of Study:  3/6/24   Ordering Provider:  Alonso Phillips MD   Clinical Indications:  SOB (shortness of breath) on exertion [R06.02 (ICD-10-CM)]       Interpreting Physicians  Performing Staff   Bridget Garzon MD Tech:  Lia Bridges, PCT        Reason for Exam  Priority: Routine  Dx: SOB (shortness of breath) on exertion [R06.02 (ICD-10-CM)]     View Images Vital Vitrea     Show images for Echo Saline Bubble? No  Summary         Left Ventricle: There is normal systolic " function with a visually estimated ejection fraction of 60 - 65%. There is normal diastolic function.    Right Ventricle: Normal right ventricular cavity size. Wall thickness is normal. Right ventricle wall motion  is normal. Systolic function is normal.    Left Atrium: There is an aneurysm along the interatrial septum.    Aortic Valve: The aortic valve is a trileaflet valve.    IVC/SVC: Normal venous pressure at 3 mmHg.    Pericardium: There is a trivial posterior effusion.   Telephone    3/28/2024  [facility]    Chris Silverman MD  Cardiology     Conversations    View All   03/28/2024 - CC'd Charts: You and Alonso Phillips MD   Patient Communications  All Conversations  (Newest Message First)  March 28, 2024           3/28/24  3:45 PM  You routed this conversation to Alonso Phillips MD   Me        3/28/24  3:44 PM  Note  I spoke with pt:     I reviewed bubble study and the bubble study is normal.  There is no right to left shunt (no PFO, no ASD).  Pt reassured.     Stress ECG is normal.  Stress echo is normal.  Pt reassured.                    3/28/24  3:41 PM    You contacted Joanne Stallings Julio    Additional Documentation    Assessment:     1. Hyperlipidemia, unspecified hyperlipidemia type    2. Atrial septal aneurysm      Plan:   Joanne was seen today for follow-up.    Diagnoses and all orders for this visit:    Hyperlipidemia, unspecified hyperlipidemia type  -     Comprehensive Metabolic Panel; Future  -     Lipid Panel; Future    Atrial septal aneurysm  -     Cancel: Echo Saline Bubble? Yes; Future  -     Comprehensive Metabolic Panel; Future  -     Lipid Panel; Future    Other orders  -     rosuvastatin (CRESTOR) 40 MG Tab; Take 1 tablet (40 mg total) by mouth every evening.      Will double the dose of rosuvastatin to 40 mg daily    RTC 3 months with lipid profile.  If necessary would add Zetia 10 mg daily to the rosuvastatin    Pt may wish to f/u with Dr Phillips and RTC prn     Follow up  in about 3 months (around 8/22/2024).

## 2024-07-19 ENCOUNTER — PATIENT MESSAGE (OUTPATIENT)
Dept: INTERNAL MEDICINE | Facility: CLINIC | Age: 59
End: 2024-07-19
Payer: COMMERCIAL

## 2024-07-22 ENCOUNTER — LAB VISIT (OUTPATIENT)
Dept: LAB | Facility: HOSPITAL | Age: 59
End: 2024-07-22
Attending: INTERNAL MEDICINE
Payer: COMMERCIAL

## 2024-07-22 DIAGNOSIS — Z00.00 WELL ADULT EXAM: ICD-10-CM

## 2024-07-22 LAB
ALBUMIN/CREAT UR: 3 UG/MG (ref 0–30)
BACTERIA #/AREA URNS AUTO: NORMAL /HPF
BILIRUB UR QL STRIP: NEGATIVE
CLARITY UR REFRACT.AUTO: CLEAR
COLOR UR AUTO: YELLOW
CREAT UR-MCNC: 199 MG/DL (ref 15–325)
GLUCOSE UR QL STRIP: NEGATIVE
HGB UR QL STRIP: ABNORMAL
HYALINE CASTS UR QL AUTO: 1 /LPF
KETONES UR QL STRIP: NEGATIVE
LEUKOCYTE ESTERASE UR QL STRIP: ABNORMAL
MICROALBUMIN UR DL<=1MG/L-MCNC: 6 UG/ML
MICROSCOPIC COMMENT: NORMAL
NITRITE UR QL STRIP: NEGATIVE
PH UR STRIP: 6 [PH] (ref 5–8)
PROT UR QL STRIP: NEGATIVE
RBC #/AREA URNS AUTO: 2 /HPF (ref 0–4)
SP GR UR STRIP: 1.03 (ref 1–1.03)
SQUAMOUS #/AREA URNS AUTO: 2 /HPF
URN SPEC COLLECT METH UR: ABNORMAL
WBC #/AREA URNS AUTO: 2 /HPF (ref 0–5)

## 2024-07-22 PROCEDURE — 82570 ASSAY OF URINE CREATININE: CPT | Performed by: INTERNAL MEDICINE

## 2024-07-22 PROCEDURE — 81001 URINALYSIS AUTO W/SCOPE: CPT | Performed by: INTERNAL MEDICINE

## 2024-07-22 PROCEDURE — 82043 UR ALBUMIN QUANTITATIVE: CPT | Performed by: INTERNAL MEDICINE

## 2024-07-25 ENCOUNTER — OFFICE VISIT (OUTPATIENT)
Dept: INTERNAL MEDICINE | Facility: CLINIC | Age: 59
End: 2024-07-25
Payer: COMMERCIAL

## 2024-07-25 VITALS
SYSTOLIC BLOOD PRESSURE: 116 MMHG | OXYGEN SATURATION: 98 % | WEIGHT: 146.38 LBS | DIASTOLIC BLOOD PRESSURE: 68 MMHG | TEMPERATURE: 97 F | HEART RATE: 71 BPM | RESPIRATION RATE: 18 BRPM | BODY MASS INDEX: 31.58 KG/M2 | HEIGHT: 57 IN

## 2024-07-25 DIAGNOSIS — E78.49 OTHER HYPERLIPIDEMIA: ICD-10-CM

## 2024-07-25 DIAGNOSIS — H92.02 LEFT EAR PAIN: Primary | ICD-10-CM

## 2024-07-25 DIAGNOSIS — E11.9 TYPE 2 DIABETES MELLITUS WITHOUT COMPLICATION, WITHOUT LONG-TERM CURRENT USE OF INSULIN: ICD-10-CM

## 2024-07-25 DIAGNOSIS — R41.840 CONCENTRATION DEFICIT: ICD-10-CM

## 2024-07-25 DIAGNOSIS — R41.840 INATTENTION: ICD-10-CM

## 2024-07-25 DIAGNOSIS — E66.9 CLASS 1 OBESITY WITH SERIOUS COMORBIDITY AND BODY MASS INDEX (BMI) OF 31.0 TO 31.9 IN ADULT, UNSPECIFIED OBESITY TYPE: ICD-10-CM

## 2024-07-25 PROCEDURE — 1160F RVW MEDS BY RX/DR IN RCRD: CPT | Mod: CPTII,S$GLB,, | Performed by: NURSE PRACTITIONER

## 2024-07-25 PROCEDURE — 99999 PR PBB SHADOW E&M-EST. PATIENT-LVL IV: CPT | Mod: PBBFAC,,, | Performed by: NURSE PRACTITIONER

## 2024-07-25 PROCEDURE — 3078F DIAST BP <80 MM HG: CPT | Mod: CPTII,S$GLB,, | Performed by: NURSE PRACTITIONER

## 2024-07-25 PROCEDURE — 3008F BODY MASS INDEX DOCD: CPT | Mod: CPTII,S$GLB,, | Performed by: NURSE PRACTITIONER

## 2024-07-25 PROCEDURE — 99214 OFFICE O/P EST MOD 30 MIN: CPT | Mod: S$GLB,,, | Performed by: NURSE PRACTITIONER

## 2024-07-25 PROCEDURE — 3066F NEPHROPATHY DOC TX: CPT | Mod: CPTII,S$GLB,, | Performed by: NURSE PRACTITIONER

## 2024-07-25 PROCEDURE — 3061F NEG MICROALBUMINURIA REV: CPT | Mod: CPTII,S$GLB,, | Performed by: NURSE PRACTITIONER

## 2024-07-25 PROCEDURE — 3072F LOW RISK FOR RETINOPATHY: CPT | Mod: CPTII,S$GLB,, | Performed by: NURSE PRACTITIONER

## 2024-07-25 PROCEDURE — 3044F HG A1C LEVEL LT 7.0%: CPT | Mod: CPTII,S$GLB,, | Performed by: NURSE PRACTITIONER

## 2024-07-25 PROCEDURE — 1159F MED LIST DOCD IN RCRD: CPT | Mod: CPTII,S$GLB,, | Performed by: NURSE PRACTITIONER

## 2024-07-25 PROCEDURE — 3074F SYST BP LT 130 MM HG: CPT | Mod: CPTII,S$GLB,, | Performed by: NURSE PRACTITIONER

## 2024-07-25 RX ORDER — LEVOCETIRIZINE DIHYDROCHLORIDE 5 MG/1
5 TABLET, FILM COATED ORAL NIGHTLY
Qty: 14 TABLET | Refills: 0 | Status: SHIPPED | OUTPATIENT
Start: 2024-07-25 | End: 2024-08-08

## 2024-07-25 RX ORDER — FLUTICASONE PROPIONATE 50 MCG
1 SPRAY, SUSPENSION (ML) NASAL DAILY
Qty: 16 G | Refills: 0 | Status: SHIPPED | OUTPATIENT
Start: 2024-07-25

## 2024-07-25 RX ORDER — SEMAGLUTIDE 2.68 MG/ML
2 INJECTION, SOLUTION SUBCUTANEOUS
Qty: 3 ML | Refills: 11 | Status: SHIPPED | OUTPATIENT
Start: 2024-07-25 | End: 2025-07-25

## 2024-07-25 NOTE — PROGRESS NOTES
"Subjective:       Patient ID: Joanne Stallings is a 58 y.o. female.    Chief Complaint: Follow-up (5mo f/u ) and Ear Fullness (Left ear fullness & mild pain)      Patient is a 58 y.o. female who traditionally follows with Alonso Phillips MD presenting today for follow up on diabetes and high cholesterol.   Patient notes that she only takes her cholesterol medication about twice per week and often forgets her once weekly ozempic injection.   States she is "scatter brained" and was previously diagnosed with adult ADHD. She has difficulty keeping a schedule, arriving on time and remembering her medications. 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   She is reports left ear pain.     Review of patient's allergies indicates:   Allergen Reactions    Iodine Hives and Swelling    Shellfish containing products Hives and Swelling       Medication List with Changes/Refills   New Medications    FLUTICASONE PROPIONATE (FLONASE) 50 MCG/ACTUATION NASAL SPRAY    1 spray (50 mcg total) by Each Nostril route once daily.    LEVOCETIRIZINE (XYZAL) 5 MG TABLET    Take 1 tablet (5 mg total) by mouth every evening. for 14 days    SEMAGLUTIDE (OZEMPIC) 2 MG/DOSE (8 MG/3 ML) PNIJ    Inject 2 mg into the skin every 7 days.   Current Medications    BLOOD SUGAR DIAGNOSTIC STRP    To check BG once daily, to use with insurance preferred meter    ESTRADIOL (ESTRACE) 0.01 % (0.1 MG/GRAM) VAGINAL CREAM    Place 1 gram vaginally nightly x 2 weeks then 1 gram twice weekly    IBUPROFEN (ADVIL,MOTRIN) 800 MG TABLET    Take 1 tablet (800 mg total) by mouth every 6 (six) hours as needed for Pain.    LANCETS MISC    1 each by Misc.(Non-Drug; Combo Route) route once daily.    ROSUVASTATIN (CRESTOR) 40 MG TAB    Take 1 tablet (40 mg total) by mouth every evening.    TRUE METRIX GLUCOSE METER MISC    USE TO CHECK BLOOD GLUCOSE ONE TIME DAILY    TRUEPLUS LANCETS 30 GAUGE MISC    Use   Discontinued Medications    " "SEMAGLUTIDE (OZEMPIC) 1 MG/DOSE (4 MG/3 ML)    Inject 1 mg into the skin every 7 days.     Medical, social and surgical history has been reviewed with the patient.     Review of Systems   Constitutional:  Negative for chills and fever.   HENT:  Positive for ear pain.    Respiratory:  Negative for cough and shortness of breath.    Cardiovascular:  Negative for chest pain.   Neurological:  Negative for dizziness and headaches.   Psychiatric/Behavioral:  Positive for decreased concentration and dysphoric mood (secondary to disorganization).       Objective:   /68 (BP Location: Right arm, Patient Position: Sitting, BP Method: Medium (Manual))   Pulse 71   Temp 97.3 °F (36.3 °C) (Temporal)   Resp 18   Ht 4' 9" (1.448 m)   Wt 66.4 kg (146 lb 6.2 oz)   LMP  (LMP Unknown)   SpO2 98%   BMI 31.68 kg/m²       Physical Exam  Vitals reviewed.   Constitutional:       Appearance: Normal appearance.   HENT:      Head: Normocephalic and atraumatic.      Right Ear: A middle ear effusion is present.      Left Ear: Tympanic membrane is bulging.      Nose:      Right Turbinates: Swollen and pale.      Left Turbinates: Swollen and pale.      Mouth/Throat:      Pharynx: Posterior oropharyngeal erythema present.   Cardiovascular:      Rate and Rhythm: Normal rate and regular rhythm.      Heart sounds: Normal heart sounds. No murmur heard.  Pulmonary:      Effort: Pulmonary effort is normal.      Breath sounds: Normal breath sounds. No wheezing.   Lymphadenopathy:      Head:      Right side of head: No submental, submandibular or tonsillar adenopathy.      Left side of head: No submental, submandibular or tonsillar adenopathy.   Skin:     General: Skin is warm and dry.   Neurological:      Mental Status: She is alert and oriented to person, place, and time.       I reviewed and independently interpreted the labs and imaging below:  Last Labs:  Glucose   Date Value Ref Range Status   07/22/2024 102 70 - 110 mg/dL Final "   05/18/2024 97 70 - 110 mg/dL Final     BUN   Date Value Ref Range Status   07/22/2024 21 (H) 6 - 20 mg/dL Final   05/18/2024 11 6 - 20 mg/dL Final     Creatinine   Date Value Ref Range Status   07/22/2024 1.0 0.5 - 1.4 mg/dL Final   05/18/2024 0.8 0.5 - 1.4 mg/dL Final     Cholesterol   Date Value Ref Range Status   07/22/2024 263 (H) 120 - 199 mg/dL Final     Comment:     The National Cholesterol Education Program (NCEP) has set the  following guidelines (reference ranges) for Cholesterol:  Optimal.....................<200 mg/dL  Borderline High.............200-239 mg/dL  High........................> or = 240 mg/dL     05/18/2024 255 (H) 120 - 199 mg/dL Final     Comment:     The National Cholesterol Education Program (NCEP) has set the  following guidelines (reference ranges) for Cholesterol:  Optimal.....................<200 mg/dL  Borderline High.............200-239 mg/dL  High........................> or = 240 mg/dL       Hemoglobin A1C   Date Value Ref Range Status   07/22/2024 6.0 (H) 4.0 - 5.6 % Final     Comment:     ADA Screening Guidelines:  5.7-6.4%  Consistent with prediabetes  >or=6.5%  Consistent with diabetes    High levels of fetal hemoglobin interfere with the HbA1C  assay. Heterozygous hemoglobin variants (HbS, HgC, etc)do  not significantly interfere with this assay.   However, presence of multiple variants may affect accuracy.     02/24/2024 6.0 (H) 4.0 - 5.6 % Final     Comment:     ADA Screening Guidelines:  5.7-6.4%  Consistent with prediabetes  >or=6.5%  Consistent with diabetes    High levels of fetal hemoglobin interfere with the HbA1C  assay. Heterozygous hemoglobin variants (HbS, HgC, etc)do  not significantly interfere with this assay.   However, presence of multiple variants may affect accuracy.       Hemoglobin   Date Value Ref Range Status   07/22/2024 12.2 12.0 - 16.0 g/dL Final   04/15/2024 12.8 12.0 - 16.0 g/dL Final     Hematocrit   Date Value Ref Range Status   07/22/2024  40.2 37.0 - 48.5 % Final   04/15/2024 40.1 37.0 - 48.5 % Final       Assessment and Plan:     1. Left ear pain - likely secondary to serous otitis media and rhinitis     - levocetirizine (XYZAL) 5 MG tablet; Take 1 tablet (5 mg total) by mouth every evening. for 14 days  Dispense: 14 tablet; Refill: 0  - fluticasone propionate (FLONASE) 50 mcg/actuation nasal spray; 1 spray (50 mcg total) by Each Nostril route once daily.  Dispense: 16 g; Refill: 0    2. Inattention  3. Concentration deficit    - Ambulatory referral/consult to Psychiatry; Future    4. Type 2 diabetes mellitus without complication, without long-term current use of insulin    Chronic, stable, continue current medication at 2 mg per week to aid in further weight loss, follow up in 3 months    - semaglutide (OZEMPIC) 2 mg/dose (8 mg/3 mL) PnIj; Inject 2 mg into the skin every 7 days.  Dispense: 3 mL; Refill: 11  - Hemoglobin A1C; Future  - Lipid Panel; Future  - Comprehensive Metabolic Panel; Future    5. Other hyperlipidemia    Chronic, elevated, continue current medication - set reminder and put next to bed to take daily     - Lipid Panel; Future    6. Class 1 obesity with serious comorbidity and body mass index (BMI) of 31.0 to 31.9 in adult, unspecified obesity type    Chronic, stable    - semaglutide (OZEMPIC) 2 mg/dose (8 mg/3 mL) PnIj; Inject 2 mg into the skin every 7 days.  Dispense: 3 mL; Refill: 11

## 2024-08-16 ENCOUNTER — HOSPITAL ENCOUNTER (OUTPATIENT)
Dept: RADIOLOGY | Facility: HOSPITAL | Age: 59
Discharge: HOME | End: 2024-08-16
Attending: INTERNAL MEDICINE
Payer: COMMERCIAL

## 2024-08-16 DIAGNOSIS — Z12.31 ENCOUNTER FOR SCREENING MAMMOGRAM FOR BREAST CANCER: ICD-10-CM

## 2024-08-16 PROCEDURE — 77067 SCR MAMMO BI INCL CAD: CPT | Mod: TC

## 2024-08-16 PROCEDURE — 77067 SCR MAMMO BI INCL CAD: CPT | Mod: 26,,, | Performed by: RADIOLOGY

## 2024-08-16 PROCEDURE — 77063 BREAST TOMOSYNTHESIS BI: CPT | Mod: 26,,, | Performed by: RADIOLOGY

## 2024-08-21 ENCOUNTER — PATIENT MESSAGE (OUTPATIENT)
Dept: ADMINISTRATIVE | Facility: HOSPITAL | Age: 59
End: 2024-08-21
Payer: COMMERCIAL

## 2024-08-27 ENCOUNTER — PATIENT OUTREACH (OUTPATIENT)
Dept: ADMINISTRATIVE | Facility: HOSPITAL | Age: 59
End: 2024-08-27
Payer: COMMERCIAL

## 2024-09-10 ENCOUNTER — TELEPHONE (OUTPATIENT)
Dept: PHARMACY | Facility: CLINIC | Age: 59
End: 2024-09-10
Payer: COMMERCIAL

## 2024-09-10 NOTE — TELEPHONE ENCOUNTER
[facility]/Population Health Chart Review & Patient Outreach Details For Medication Adherence Project    Reason for Outreach Encounter: 3rd Party payor non-compliance report ([employer], BCBS, C, etc)  2.  Patient Outreach Method: Reviewed Patient Chart  3.   Medication in question: ozempic   LAST FILLED: 8/21/24 for 28 day supply  Diabetes Medications               semaglutide (OZEMPIC) 2 mg/dose (8 mg/3 mL) PnIj Inject 2 mg into the skin every 7 days.              4.  Reviewed and or Updates Made To: Patient Chart  5. Outreach Outcomes and/or actions taken: Patient filled medication and is on track to be adherent

## 2024-09-15 DIAGNOSIS — H92.02 LEFT EAR PAIN: ICD-10-CM

## 2024-09-16 RX ORDER — FLUTICASONE PROPIONATE 50 MCG
SPRAY, SUSPENSION (ML) NASAL
Qty: 24 ML | Refills: 1 | Status: SHIPPED | OUTPATIENT
Start: 2024-09-16

## 2024-09-16 NOTE — TELEPHONE ENCOUNTER
Refill Routing Note   Medication(s) are not appropriate for processing by [facility] for the following reason(s):        No active prescription written by provider  New or recently adjusted medication  ED/Hospital Visit since last OV with provider    [facility] action(s):  Defer        Medication Therapy Plan: ED visit for adnexal mass      Appointments  past 12m or future 3m with PCP    Date Provider   Last Visit   Visit date not found Alonso Phillips MD   Next Visit   12/16/2024 Alonso Phillips MD   ED visits in past 90 days: 0        Note composed:2:08 PM 09/16/2024

## 2024-09-20 NOTE — PROGRESS NOTES
Outpatient Psychiatry Initial Visit (MD/NP)    2019    Joanne Stallings, a 54 y.o. female, for initial evaluation visit.  Patient is referred by Alonso Phillips MD       Reason for Encounter: Referral for treatment    Complaints:  She has been experiencing distractability. Ms Stallings was seen for an initial evaluation regarding a lifelong history of ADD. 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 She is a procrastinator and last minute type of person who is always rushing due to her disorganization. She cannot complete tasks and tends to have too much going at the same time. She has no signs of symptoms of veda or psychosis, and no symptoms of depression. She has no history of suicide attempts or psych hospitalizations. She has no active thoughts of harming herself or others. She is currently, in addition to teaching now caring for 4 young children, who are children of a  family member who was addicted to drugs. She is an intelligent and highly motivated person who wants to succeed at her tasks but cannot due to her lack of organizational skills. She has never been treated for ADD in the past. She has no family history of psych problems. She is  and has a degree in business administration and a teaching certificate as well. She is not taking any psych medicines at this time. She is seen as the helping person in her family.    Current Medications:  Medication List with Changes/Refills   New Medications    ATOMOXETINE (STRATTERA) 18 MG CAPSULE    Take 1 capsule (18 mg total) by mouth once daily.   Current Medications    BLOOD SUGAR DIAGNOSTIC STRP    Test blood sugar once daily    BLOOD SUGAR DIAGNOSTIC STRP    To check BG once daily, to use with insurance preferred meter    BLOOD-GLUCOSE METER KIT    To check BG1 times daily, to use  with insurance preferred meter    BORIC ACID (BORIC ACID) VAGINAL SUPPOSITORY    Place 1 each (650 mg total) vaginally every evening.    DICLOFENAC SODIUM (PENNSAID) 20 MG/GRAM /ACTUATION(2 %) SOPM    Apply 40 mg topically 2 (two) times daily.    ESTRADIOL (ESTRACE) 0.01 % (0.1 MG/GRAM) VAGINAL CREAM    Place 1 g vaginally twice a week.    LANCETS MISC    1 each by Misc.(Non-Drug; Combo Route) route once daily.    MECLIZINE (ANTIVERT) 25 MG TABLET    Take 1 tablet (25 mg total) by mouth 3 (three) times daily as needed.    METFORMIN (GLUCOPHAGE) 500 MG TABLET    Take 1 tablet (500 mg total) by mouth before dinner.    ROSUVASTATIN (CRESTOR) 20 MG TABLET    Take 1 tablet (20 mg total) by mouth once daily.    TRIAMCINOLONE ACETONIDE 0.1% (KENALOG) 0.1 % CREAM    Apply topically 2 (two) times daily.        Stressors:  New teaching position, caring for four young children, and lack of focus.    History:     Past Psychiatric History:   Previous therapy: yes  Previous psychiatric treatment and medication trials: yes  Previous psychiatric hospitalizations: no  Previous diagnoses: yes  Previous suicide attempts: no  History of violence: no  Currently in treatment with myself.  Education: post college graduate work or degree  Other pertinent history: None  Depression screening was performed with standardized tool: Not Screened - Not Eligible/Appropriate    Substance Abuse History:  Recreational drugs: No recreational drug use  Use of alcohol: occasional, social use  Use of caffeine:  one cup of coffee and two diet drinks daily  Tobacco use: no  Legal consequences of chemical use: no  Patient feels she ought to cut down on drinking and/or drug use: no  Patient has been annoyed by others criticizing her drinking or drug use: no  Patient has felt bad or guilty about drinking or drug use:no  Patient has had a drink or used drugs as an eye opener first thing in the morning to steady nerves, get rid of a hangover or get the day  started: no  Use of OTC medications: Tylenol    The following portions of the patient's history were reviewed and updated as appropriate: allergies, current medications, past family history, past medical history, past social history, past surgical history and problem list.    Record Review: moderate      Review Of Systems:     Medical Review Of Systems:  ROS     Psychiatric Review Of Systems:  Sleep: no  Appetite changes: no  Weight changes: no  Energy: no  Interest/pleasure/anhedonia: no  Somatic symptoms: no  Libido: Not questioned this interview  Anxiety/panic: yes  Guilty/hopeless: no  Self-injurious behavior/risky behavior: no  Any drugs: no  Alcohol: no     Current Evaluation:       Mental Status Evaluation:  Appearance:  unremarkable, age appropriate, casually dressed, neatly groomed   Behavior:  normal, cooperative   Speech:  no latency; no press, spontaneous   Mood:  euthymic   Affect:  congruent and appropriate   Thought Process:  normal and logical   Thought Content:  normal, no suicidality, no homicidality, delusions, or paranoia   Sensorium:  grossly intact   Cognition:  grossly intact   Insight:  has awareness of illness   Judgment:  behavior is adequate to circumstances     SIGECAPS  Sleep:   Interest:   Guilt:   Energy:   Concentration:   Appetite:   Psychomotor agitation:   Suicidal intentions: no  Suicidal plan: no    Physical/Somatic Complaints  The patient lists: no physical complaints.    Functioning in Relationships:  Spouse/partner:   Peers:   Employers:       Assessment - Diagnosis - Goals:       ICD-10-CM ICD-9-CM   1. Attention deficit disorder (ADD) without hyperactivity F98.8 314.00       Treatment Goals:  Specify outcomes written in observable, behavioral terms:   Assist with focus and organizational skills and provide supportive guidance    Treatment Plan/Recommendations: Patient agreed to next visit in 4 weeks, and to start Strattera 18 mg q am. I reviewed the side effects of this  medicine and advised she could call if she had problems with the medicine or her clinical condition..  Follow-up plan for depression was discussed with patient.    Review with patient: Treatment plan reviewed with the patient.  Medication risks/benefit reviewed with the patient    Cuate Rodriguez Jr   Poor balance

## 2024-10-29 ENCOUNTER — TELEPHONE (OUTPATIENT)
Dept: PHARMACY | Facility: CLINIC | Age: 59
End: 2024-10-29
Payer: COMMERCIAL

## 2024-11-15 ENCOUNTER — TELEPHONE (OUTPATIENT)
Dept: PHARMACY | Facility: CLINIC | Age: 59
End: 2024-11-15
Payer: COMMERCIAL

## 2024-11-15 NOTE — TELEPHONE ENCOUNTER
[facility]/Population Health Chart Review & Patient Outreach Details For Medication Adherence Project    Reason for Outreach Encounter: 3rd Party payor non-compliance report ([employer], BCBS, [employer], etc)  2.  Patient Outreach Method: Reviewed patient chart   3.   Medication in question:   Diabetes Medications               semaglutide (OZEMPIC) 2 mg/dose (8 mg/3 mL) PnIj Inject 2 mg into the skin every 7 days.                 ozempic  last filled  11/3 for 28 day supply      4.  Reviewed and or Updates Made To: Patient Chart  5. Outreach Outcomes and/or actions taken: Patient filled medication and is on track to be adherent  Additional Notes:

## 2024-11-20 ENCOUNTER — OFFICE VISIT (OUTPATIENT)
Dept: OPTOMETRY | Facility: CLINIC | Age: 59
End: 2024-11-20
Payer: COMMERCIAL

## 2024-11-20 DIAGNOSIS — H52.4 MYOPIA WITH PRESBYOPIA OF BOTH EYES: ICD-10-CM

## 2024-11-20 DIAGNOSIS — H52.13 MYOPIA WITH PRESBYOPIA OF BOTH EYES: ICD-10-CM

## 2024-11-20 DIAGNOSIS — H25.13 NUCLEAR SCLEROSIS, BILATERAL: ICD-10-CM

## 2024-11-20 DIAGNOSIS — Z46.0 FITTING AND ADJUSTMENT OF SPECTACLES AND CONTACT LENSES: Primary | ICD-10-CM

## 2024-11-20 DIAGNOSIS — Z97.3 WEARS CONTACT LENSES: ICD-10-CM

## 2024-11-20 DIAGNOSIS — E11.9 TYPE 2 DIABETES MELLITUS WITHOUT RETINOPATHY: Primary | ICD-10-CM

## 2024-11-20 PROCEDURE — 3061F NEG MICROALBUMINURIA REV: CPT | Mod: CPTII,S$GLB,, | Performed by: OPTOMETRIST

## 2024-11-20 PROCEDURE — 99999 PR PBB SHADOW E&M-EST. PATIENT-LVL III: CPT | Mod: PBBFAC,,, | Performed by: OPTOMETRIST

## 2024-11-20 PROCEDURE — 92014 COMPRE OPH EXAM EST PT 1/>: CPT | Mod: S$GLB,,, | Performed by: OPTOMETRIST

## 2024-11-20 PROCEDURE — 3066F NEPHROPATHY DOC TX: CPT | Mod: CPTII,S$GLB,, | Performed by: OPTOMETRIST

## 2024-11-20 PROCEDURE — 2023F DILAT RTA XM W/O RTNOPTHY: CPT | Mod: CPTII,S$GLB,, | Performed by: OPTOMETRIST

## 2024-11-20 PROCEDURE — 92015 DETERMINE REFRACTIVE STATE: CPT | Mod: S$GLB,,, | Performed by: OPTOMETRIST

## 2024-11-20 PROCEDURE — 3044F HG A1C LEVEL LT 7.0%: CPT | Mod: CPTII,S$GLB,, | Performed by: OPTOMETRIST

## 2024-11-20 NOTE — PROGRESS NOTES
HPI    58 Y/o female is here for routine eye exam with C/o pt states she noticing   vision has change a lot and say's when driving at night she is blinded   badly be the lights. Pt also say's OS has been itching a lot this last   week.   Pt denies pain and discomfort   No f/f    Eye med: otc allergy gtt OU PRN   Last edited by Kevin Montemayor MA on 11/20/2024  8:27 AM.            Assessment /Plan     For exam results, see Encounter Report.    Type 2 diabetes mellitus without retinopathy    Myopia with presbyopia of both eyes    Wears contact lenses    Nuclear sclerosis, bilateral      Cat OU w inc myopia--pt wishes new Rx (dist only--removes toread)  Good fit w AIR OPTIX, but needs more minus  DM- WITHOUT RETINOPATHY.  Advised yearly DFE   Lattice inf OD    PLAN:    DISP trial Cls in new [place]  Continue Daily Wear schedule.  NO SLEEPING IN CONTACT LENSES. Clean and disinfect nightly.  exchange monthly.     If no problems will call for CLRx, rtc 1 yrRtc 1 yr

## 2024-12-09 ENCOUNTER — LAB VISIT (OUTPATIENT)
Dept: LAB | Facility: HOSPITAL | Age: 59
End: 2024-12-09
Attending: NURSE PRACTITIONER
Payer: COMMERCIAL

## 2024-12-09 DIAGNOSIS — E78.49 OTHER HYPERLIPIDEMIA: ICD-10-CM

## 2024-12-09 DIAGNOSIS — E11.9 TYPE 2 DIABETES MELLITUS WITHOUT COMPLICATION, WITHOUT LONG-TERM CURRENT USE OF INSULIN: ICD-10-CM

## 2024-12-09 LAB
ALBUMIN SERPL BCP-MCNC: 3.9 G/DL (ref 3.5–5.2)
ALP SERPL-CCNC: 83 U/L (ref 40–150)
ALT SERPL W/O P-5'-P-CCNC: 10 U/L (ref 10–44)
ANION GAP SERPL CALC-SCNC: 8 MMOL/L (ref 8–16)
AST SERPL-CCNC: 22 U/L (ref 10–40)
BILIRUB SERPL-MCNC: 0.4 MG/DL (ref 0.1–1)
BUN SERPL-MCNC: 17 MG/DL (ref 6–20)
CALCIUM SERPL-MCNC: 9.7 MG/DL (ref 8.7–10.5)
CHLORIDE SERPL-SCNC: 108 MMOL/L (ref 95–110)
CHOLEST SERPL-MCNC: 284 MG/DL (ref 120–199)
CHOLEST/HDLC SERPL: 5 {RATIO} (ref 2–5)
CO2 SERPL-SCNC: 24 MMOL/L (ref 23–29)
CREAT SERPL-MCNC: 0.8 MG/DL (ref 0.5–1.4)
EST. GFR  (NO RACE VARIABLE): >60 ML/MIN/1.73 M^2
ESTIMATED AVG GLUCOSE: 126 MG/DL (ref 68–131)
GLUCOSE SERPL-MCNC: 89 MG/DL (ref 70–110)
HBA1C MFR BLD: 6 % (ref 4–5.6)
HDLC SERPL-MCNC: 57 MG/DL (ref 40–75)
HDLC SERPL: 20.1 % (ref 20–50)
LDLC SERPL CALC-MCNC: 214.2 MG/DL (ref 63–159)
NONHDLC SERPL-MCNC: 227 MG/DL
POTASSIUM SERPL-SCNC: 4.6 MMOL/L (ref 3.5–5.1)
PROT SERPL-MCNC: 7.9 G/DL (ref 6–8.4)
SODIUM SERPL-SCNC: 140 MMOL/L (ref 136–145)
TRIGL SERPL-MCNC: 64 MG/DL (ref 30–150)

## 2024-12-09 PROCEDURE — 80061 LIPID PANEL: CPT | Performed by: NURSE PRACTITIONER

## 2024-12-09 PROCEDURE — 83036 HEMOGLOBIN GLYCOSYLATED A1C: CPT | Performed by: NURSE PRACTITIONER

## 2024-12-09 PROCEDURE — 80053 COMPREHEN METABOLIC PANEL: CPT | Performed by: NURSE PRACTITIONER

## 2024-12-16 ENCOUNTER — OFFICE VISIT (OUTPATIENT)
Dept: INTERNAL MEDICINE | Facility: CLINIC | Age: 59
End: 2024-12-16
Payer: COMMERCIAL

## 2024-12-16 VITALS
BODY MASS INDEX: 31.24 KG/M2 | SYSTOLIC BLOOD PRESSURE: 120 MMHG | WEIGHT: 144.81 LBS | HEIGHT: 57 IN | HEART RATE: 79 BPM | DIASTOLIC BLOOD PRESSURE: 72 MMHG | RESPIRATION RATE: 18 BRPM | TEMPERATURE: 98 F | OXYGEN SATURATION: 99 %

## 2024-12-16 DIAGNOSIS — E11.9 TYPE 2 DIABETES MELLITUS WITHOUT COMPLICATION, WITHOUT LONG-TERM CURRENT USE OF INSULIN: Primary | ICD-10-CM

## 2024-12-16 DIAGNOSIS — R53.83 FATIGUE, UNSPECIFIED TYPE: ICD-10-CM

## 2024-12-16 DIAGNOSIS — E78.49 OTHER HYPERLIPIDEMIA: ICD-10-CM

## 2024-12-16 DIAGNOSIS — M25.551 RIGHT HIP PAIN: ICD-10-CM

## 2024-12-16 DIAGNOSIS — Z23 NEED FOR VACCINATION: ICD-10-CM

## 2024-12-16 DIAGNOSIS — I25.3 ATRIAL SEPTAL ANEURYSM: ICD-10-CM

## 2024-12-16 PROCEDURE — 99999 PR PBB SHADOW E&M-EST. PATIENT-LVL IV: CPT | Mod: PBBFAC,,, | Performed by: INTERNAL MEDICINE

## 2024-12-16 PROCEDURE — 90656 IIV3 VACC NO PRSV 0.5 ML IM: CPT | Mod: S$GLB,,, | Performed by: INTERNAL MEDICINE

## 2024-12-16 PROCEDURE — 99214 OFFICE O/P EST MOD 30 MIN: CPT | Mod: 25,S$GLB,, | Performed by: INTERNAL MEDICINE

## 2024-12-16 PROCEDURE — 3078F DIAST BP <80 MM HG: CPT | Mod: CPTII,S$GLB,, | Performed by: INTERNAL MEDICINE

## 2024-12-16 PROCEDURE — 3008F BODY MASS INDEX DOCD: CPT | Mod: CPTII,S$GLB,, | Performed by: INTERNAL MEDICINE

## 2024-12-16 PROCEDURE — 90471 IMMUNIZATION ADMIN: CPT | Mod: S$GLB,,, | Performed by: INTERNAL MEDICINE

## 2024-12-16 PROCEDURE — 1160F RVW MEDS BY RX/DR IN RCRD: CPT | Mod: CPTII,S$GLB,, | Performed by: INTERNAL MEDICINE

## 2024-12-16 PROCEDURE — 3066F NEPHROPATHY DOC TX: CPT | Mod: CPTII,S$GLB,, | Performed by: INTERNAL MEDICINE

## 2024-12-16 PROCEDURE — 3044F HG A1C LEVEL LT 7.0%: CPT | Mod: CPTII,S$GLB,, | Performed by: INTERNAL MEDICINE

## 2024-12-16 PROCEDURE — 3061F NEG MICROALBUMINURIA REV: CPT | Mod: CPTII,S$GLB,, | Performed by: INTERNAL MEDICINE

## 2024-12-16 PROCEDURE — 1159F MED LIST DOCD IN RCRD: CPT | Mod: CPTII,S$GLB,, | Performed by: INTERNAL MEDICINE

## 2024-12-16 PROCEDURE — 3074F SYST BP LT 130 MM HG: CPT | Mod: CPTII,S$GLB,, | Performed by: INTERNAL MEDICINE

## 2024-12-16 NOTE — PROGRESS NOTES
"Subjective:       Patient ID: Joanne Stallings is a 59 y.o. female.    Chief Complaint: Follow-up    History of Present Illness    CHIEF COMPLAINT:  Joanne presents today for follow-up of medical conditions which include type 2 diabetes mellitus, hyperlipidemia, obesity, right hip pain, and fatigue.  The patient states she is now retired however she is caring for 4 children age is 6 through 11.  She does complain of fatigue.  She occasionally feels depressed at times due to stress.  Her meal intake is variable.  She is using Ozempic.  She has noted decreased appetite and early satiety.  She is averaging 8 hours of sleep most nights.  She does not monitor blood sugars regularly.    She hurt her right hip 2 days ago.  She notes increased pain after prolonged sitting.    HISTORY OF PRESENT ILLNESS - HIP PAIN:  She experiences pain in the upper leg and hip area following a fall from a ladder. The pain is localized to the outer part of the hip and groin area, described as more soreness than unbearable pain. She reports increased soreness in the morning upon waking and discomfort when lying on one side.    HISTORY OF PRESENT ILLNESS - FATIGUE:  She reports feeling lethargic and sleepy since before Thanksgiving, which she attributes to depression following her mother's passing and the holiday season. She sleeps approximately eight hours nightly with brief awakening around 2-3 AM. She occasionally snores.    MENTAL HEALTH:  She feels overwhelmed and describes herself as being "all over the place" since her mother's passing. She previously took medication for focus but discontinued it after her mother's death.    MEDICATIONS:  She is prescribed rosuvastatin 40 mg but takes it irregularly, often skipping doses to every other day or every two days. She is also on Ozempic.    WEIGHT MANAGEMENT:  She reports 10-pound weight loss since starting Ozempic with early satiety and decreased appetite.    VISION:  She experiences night " blindness and glare.    LABS:  Cholesterol was 284 with LDL of 214. Glucose was 89 and hemoglobin A1c was 6%. Thyroid level was normal in July. Electrolytes, kidney function, and liver tests were normal.    IMMUNIZATIONS:  Last documented flu vaccine was December 28, 2021.      ROS:  Constitutional: +weight loss, +fatigue, +appetite changes  Eyes: +vision changes  Musculoskeletal: +joint pain, -muscle pain  Psychiatric: +sleep difficulty              Physical Exam  Vitals and nursing note reviewed.   Constitutional:       General: She is not in acute distress.     Appearance: Normal appearance. She is well-developed.   HENT:      Head: Normocephalic and atraumatic.   Eyes:      General: No scleral icterus.     Extraocular Movements: Extraocular movements intact.      Conjunctiva/sclera: Conjunctivae normal.   Neck:      Thyroid: No thyromegaly.      Vascular: No JVD.   Cardiovascular:      Rate and Rhythm: Normal rate and regular rhythm.      Heart sounds: Normal heart sounds. No murmur heard.     No friction rub. No gallop.   Pulmonary:      Effort: Pulmonary effort is normal. No respiratory distress.      Breath sounds: Normal breath sounds. No wheezing or rales.   Abdominal:      General: Bowel sounds are normal.      Palpations: Abdomen is soft. There is no mass.      Tenderness: There is no abdominal tenderness.   Musculoskeletal:         General: No tenderness. Normal range of motion.      Cervical back: Normal range of motion and neck supple.   Lymphadenopathy:      Cervical: No cervical adenopathy.   Skin:     General: Skin is warm and dry.      Findings: No rash.      Comments: No foot lesions are present.   Neurological:      Mental Status: She is alert and oriented to person, place, and time.      Cranial Nerves: No cranial nerve deficit.      Comments: Sensory exam is intact in both feet on monofilament testing.   Psychiatric:         Mood and Affect: Mood normal.         Behavior: Behavior normal.          Protective Sensation (w/ 10 gram monofilament):  Right: Intact  Left: Intact    Visual Inspection:  Normal -  Bilateral    Pedal Pulses:   Right: Present  Left: Present    Posterior Tibialis Pulses:   Right:Present  Left: Present    Lab Visit on 12/09/2024   Component Date Value Ref Range Status    Hemoglobin A1C 12/09/2024 6.0 (H)  4.0 - 5.6 % Final    Comment: ADA Screening Guidelines:  5.7-6.4%  Consistent with prediabetes  >or=6.5%  Consistent with diabetes    High levels of fetal hemoglobin interfere with the HbA1C  assay. Heterozygous hemoglobin variants (HbS, HgC, etc)do  not significantly interfere with this assay.   However, presence of multiple variants may affect accuracy.      Estimated Avg Glucose 12/09/2024 126  68 - 131 mg/dL Final    Cholesterol 12/09/2024 284 (H)  120 - 199 mg/dL Final    Comment: The National Cholesterol Education Program (NCEP) has set the  following guidelines (reference ranges) for Cholesterol:  Optimal.....................<200 mg/dL  Borderline High.............200-239 mg/dL  High........................> or = 240 mg/dL      Triglycerides 12/09/2024 64  30 - 150 mg/dL Final    Comment: The National Cholesterol Education Program (NCEP) has set the  following guidelines (reference values) for triglycerides:  Normal......................<150 mg/dL  Borderline High.............150-199 mg/dL  High........................200-499 mg/dL      HDL 12/09/2024 57  40 - 75 mg/dL Final    Comment: The National Cholesterol Education Program (NCEP) has set the  following guidelines (reference values) for HDL Cholesterol:  Low...............<40 mg/dL  Optimal...........>60 mg/dL      LDL Cholesterol 12/09/2024 214.2 (H)  63.0 - 159.0 mg/dL Final    Comment: The National Cholesterol Education Program (NCEP) has set the  following guidelines (reference values) for LDL Cholesterol:  Optimal.......................<130 mg/dL  Borderline High...............130-159  mg/dL  High..........................160-189 mg/dL  Very High.....................>190 mg/dL      HDL/Cholesterol Ratio 12/09/2024 20.1  20.0 - 50.0 % Final    Total Cholesterol/HDL Ratio 12/09/2024 5.0  2.0 - 5.0 Final    Non-HDL Cholesterol 12/09/2024 227  mg/dL Final    Comment: Risk category and Non-HDL cholesterol goals:  Coronary heart disease (CHD)or equivalent (10-year risk of CHD >20%):  Non-HDL cholesterol goal     <130 mg/dL  Two or more CHD risk factors and 10-year risk of CHD <= 20%:  Non-HDL cholesterol goal     <160 mg/dL  0 to 1 CHD risk factor:  Non-HDL cholesterol goal     <190 mg/dL      Sodium 12/09/2024 140  136 - 145 mmol/L Final    Potassium 12/09/2024 4.6  3.5 - 5.1 mmol/L Final    Chloride 12/09/2024 108  95 - 110 mmol/L Final    CO2 12/09/2024 24  23 - 29 mmol/L Final    Glucose 12/09/2024 89  70 - 110 mg/dL Final    BUN 12/09/2024 17  6 - 20 mg/dL Final    Creatinine 12/09/2024 0.8  0.5 - 1.4 mg/dL Final    Calcium 12/09/2024 9.7  8.7 - 10.5 mg/dL Final    Total Protein 12/09/2024 7.9  6.0 - 8.4 g/dL Final    Albumin 12/09/2024 3.9  3.5 - 5.2 g/dL Final    Total Bilirubin 12/09/2024 0.4  0.1 - 1.0 mg/dL Final    Comment: For infants and newborns, interpretation of results should be based  on gestational age, weight and in agreement with clinical  observations.    Premature Infant recommended reference ranges:  Up to 24 hours.............<8.0 mg/dL  Up to 48 hours............<12.0 mg/dL  3-5 days..................<15.0 mg/dL  6-29 days.................<15.0 mg/dL      Alkaline Phosphatase 12/09/2024 83  40 - 150 U/L Final    AST 12/09/2024 22  10 - 40 U/L Final    ALT 12/09/2024 10  10 - 44 U/L Final    eGFR 12/09/2024 >60.0  >60 mL/min/1.73 m^2 Final    Anion Gap 12/09/2024 8  8 - 16 mmol/L Final       Assessment & Plan:     Assessment & Plan     Considered patient's recent weight loss and stable blood pressure   Noted poor cholesterol control due to inconsistent rosuvastatin use    Evaluated fatigue, considering seasonal depression and nutritional factors   Assessed hip pain from recent fall, determining x-ray not immediately necessary   Reviewed appropriateness of pneumococcal vaccine due to diabetes diagnosis    HYPERLIPIDEMIA:   Explained importance of consistent statin use for cholesterol management.   Joanne to improve adherence to cholesterol medication regimen.   Continued rosuvastatin 40 mg daily.  The patient has been taking the medication every couple of days.    CHRONIC FATIGUE:   Discussed potential benefits of multivitamin supplementation.   Joanne to consider incorporating a daily multivitamin.   Joanne to monitor and report any changes in fatigue levels.   Started OTC multivitamin (e.g., Centrum for women or One A Day for women) daily.    HIP PAIN:   Provided information on proper ibuprofen dosage for hip pain management.   Started ibuprofen 600 mg 2-3 times daily as needed for hip pain, for a few days.   Contact the office if hip pain worsens or persists beyond a few days.    PREVENTIVE CARE:   Administered flu vaccine in office.   Administered Prevnar 20 (pneumococcal vaccine) in office.    FOLLOW-UP:   Follow up on January 13th as scheduled for ADHD medication evaluation.         Follow up in about 6 months (around 6/16/2025).     Alonso Phillips MD

## 2024-12-18 ENCOUNTER — PATIENT MESSAGE (OUTPATIENT)
Dept: OPTOMETRY | Facility: CLINIC | Age: 59
End: 2024-12-18
Payer: COMMERCIAL

## 2024-12-18 ENCOUNTER — PATIENT MESSAGE (OUTPATIENT)
Dept: INTERNAL MEDICINE | Facility: CLINIC | Age: 59
End: 2024-12-18
Payer: COMMERCIAL

## 2024-12-21 ENCOUNTER — TELEPHONE (OUTPATIENT)
Dept: PHARMACY | Facility: CLINIC | Age: 59
End: 2024-12-21
Payer: COMMERCIAL

## 2024-12-21 NOTE — TELEPHONE ENCOUNTER
[facility]/Population Health Chart Review & Patient Outreach Details For Medication Adherence Project    Reason for Outreach Encounter: 3rd Party payor non-compliance report ([employer], BCBS, C, etc)  2.  Patient Outreach Method: [employer] message  3.   Medication in question: ozempic   LAST FILLED: 10/8/24 for 28 day supply  Diabetes Medications               semaglutide (OZEMPIC) 2 mg/dose (8 mg/3 mL) PnIj Inject 2 mg into the skin every 7 days.              4.  Reviewed and or Updates Made To: Patient Chart  5. Outreach Outcomes and/or actions taken: Sent inquiry to patient: Waiting for response.

## 2024-12-27 ENCOUNTER — PATIENT MESSAGE (OUTPATIENT)
Dept: OPTOMETRY | Facility: CLINIC | Age: 59
End: 2024-12-27
Payer: COMMERCIAL

## 2025-01-13 ENCOUNTER — CLINICAL SUPPORT (OUTPATIENT)
Dept: PSYCHIATRY | Facility: CLINIC | Age: 60
End: 2025-01-13
Payer: COMMERCIAL

## 2025-01-13 DIAGNOSIS — R41.840 INATTENTION: ICD-10-CM

## 2025-01-13 DIAGNOSIS — R41.840 CONCENTRATION DEFICIT: ICD-10-CM

## 2025-01-13 PROCEDURE — 99999 PR PBB SHADOW E&M-EST. PATIENT-LVL I: CPT | Mod: PBBFAC,,,

## 2025-01-13 PROCEDURE — 99499 UNLISTED E&M SERVICE: CPT | Mod: S$GLB,,, | Performed by: PSYCHOLOGIST

## 2025-01-13 NOTE — PROGRESS NOTES
Adult ADHD Clinic Orientation Seminar   Orientation/Psychoeducation    Patient's attendance: Attended in Full     Seminar/Group Focus: ADHD Clinic Orientation Seminar  Target symptoms: ADHD    Site: [facility]  Clinical status of patient: Outpatient  No LOS. Billing Provider and Co-signer: Candice Alvarez, PhD (see signature below)  Length of Service: 35 minutes    Seminar/Group Topic:  Behavioral Health  Seminar/Group Department: [address]  Seminar/Group Facilitators: Cristin Randall LMSW  # of patients: 12    Interval history: Patient presented for the Adult ADHD Clinic orientation seminar. The seminar provided information regarding guidelines and structure of assessment, diagnosis, and treatment in this clinic.  Diagnosis:   1. Concentration deficit  Ambulatory referral/consult to Psychiatry      2. Inattention  Ambulatory referral/consult to Psychiatry        Patient's response: Accepting  Progress toward goals and other mental status changes: As expected    Plan: Patient will indicate whether to proceed with the Adult ADHD Clinic pre-screen  Return to clinic: as scheduled            Candice Alvarez, PhD  Clinical Psychologist

## 2025-02-05 ENCOUNTER — OFFICE VISIT (OUTPATIENT)
Dept: INTERNAL MEDICINE | Facility: CLINIC | Age: 60
End: 2025-02-05
Payer: COMMERCIAL

## 2025-02-05 VITALS
RESPIRATION RATE: 18 BRPM | DIASTOLIC BLOOD PRESSURE: 84 MMHG | BODY MASS INDEX: 31.11 KG/M2 | WEIGHT: 144.19 LBS | SYSTOLIC BLOOD PRESSURE: 122 MMHG | HEART RATE: 71 BPM | OXYGEN SATURATION: 99 % | HEIGHT: 57 IN | TEMPERATURE: 97 F

## 2025-02-05 DIAGNOSIS — R10.30 LOWER ABDOMINAL PAIN: Primary | ICD-10-CM

## 2025-02-05 DIAGNOSIS — Z88.8 ALLERGY TO IODINE: ICD-10-CM

## 2025-02-05 PROCEDURE — 1160F RVW MEDS BY RX/DR IN RCRD: CPT | Mod: CPTII,S$GLB,, | Performed by: NURSE PRACTITIONER

## 2025-02-05 PROCEDURE — 3008F BODY MASS INDEX DOCD: CPT | Mod: CPTII,S$GLB,, | Performed by: NURSE PRACTITIONER

## 2025-02-05 PROCEDURE — 3079F DIAST BP 80-89 MM HG: CPT | Mod: CPTII,S$GLB,, | Performed by: NURSE PRACTITIONER

## 2025-02-05 PROCEDURE — 99999 PR PBB SHADOW E&M-EST. PATIENT-LVL IV: CPT | Mod: PBBFAC,,, | Performed by: NURSE PRACTITIONER

## 2025-02-05 PROCEDURE — 3072F LOW RISK FOR RETINOPATHY: CPT | Mod: CPTII,S$GLB,, | Performed by: NURSE PRACTITIONER

## 2025-02-05 PROCEDURE — 99213 OFFICE O/P EST LOW 20 MIN: CPT | Mod: S$GLB,,, | Performed by: NURSE PRACTITIONER

## 2025-02-05 PROCEDURE — 1159F MED LIST DOCD IN RCRD: CPT | Mod: CPTII,S$GLB,, | Performed by: NURSE PRACTITIONER

## 2025-02-05 PROCEDURE — 3074F SYST BP LT 130 MM HG: CPT | Mod: CPTII,S$GLB,, | Performed by: NURSE PRACTITIONER

## 2025-02-05 RX ORDER — PREDNISONE 50 MG/1
TABLET ORAL
Qty: 3 TABLET | Refills: 0 | Status: SHIPPED | OUTPATIENT
Start: 2025-02-05

## 2025-02-05 RX ORDER — DIPHENHYDRAMINE HCL 50 MG
CAPSULE ORAL
Qty: 1 CAPSULE | Refills: 0 | Status: SHIPPED | OUTPATIENT
Start: 2025-02-05

## 2025-02-05 NOTE — PROGRESS NOTES
"Subjective:       Patient ID: Joanne Stallings is a 59 y.o. female.    Chief Complaint: Leg Pain (Right leg) and Abdominal Pain (Lower abd; pain is triggered by bending or sitting; feels like a "pull")    History of Present Illness    CHIEF COMPLAINT:  Ms. Stallings presents today with leg pain and abdominal discomfort following Ozempic injection.    SYMPTOMS:  She changed Ozempic injection site to right thigh for 1/26 dose which led to leg stiffness and bruising. The most recent injection was given in the abdomen on Sunday (2/2). She experiences nausea with Ozempic shots, typically lasting about two days before subsiding. She notes constipation baseline but worse with Ozempic. She denies fever, chills, vaginal discharge or urinary symptoms.    She reports pain in lower abdomen since Saturday with associated bloating. She describes difficulty with movement, including walking gingerly, pain with bending, and tightness in thighs. Pain is rated at 5-6/10, with spikes during certain movements. She reports discomfort with activities such as getting into truck and putting on pants.    ALLERGIES:  She reports iodine allergy described as itching, HIVES and angioedema.           Review of patient's allergies indicates:   Allergen Reactions    Iodine Hives and Swelling    Shellfish containing products Hives and Swelling       Medication List with Changes/Refills   New Medications    DIPHENHYDRAMINE (BENADRYL) 50 MG CAPSULE    Take 50mg by mouth 1 hour before contrast administration.    PREDNISONE (DELTASONE) 50 MG TAB    Take 50mg by mouth at 13 hours, 7 hours, and 1 hour before contrast administration.   Current Medications    BLOOD SUGAR DIAGNOSTIC STRP    To check BG once daily, to use with insurance preferred meter    ESTRADIOL (ESTRACE) 0.01 % (0.1 MG/GRAM) VAGINAL CREAM    Place 1 gram vaginally nightly x 2 weeks then 1 gram twice weekly    FLUTICASONE PROPIONATE (FLONASE) 50 MCG/ACTUATION NASAL SPRAY    USE 1 SPRAY (50 " "MCG TOTAL) IN EACH NOSTRIL ONCE DAILY    IBUPROFEN (ADVIL,MOTRIN) 800 MG TABLET    Take 1 tablet (800 mg total) by mouth every 6 (six) hours as needed for Pain.    LANCETS MISC    1 each by Misc.(Non-Drug; Combo Route) route once daily.    LEVOCETIRIZINE (XYZAL) 5 MG TABLET    Take 1 tablet (5 mg total) by mouth every evening. for 14 days    ROSUVASTATIN (CRESTOR) 40 MG TAB    Take 1 tablet (40 mg total) by mouth every evening.    SEMAGLUTIDE (OZEMPIC) 2 MG/DOSE (8 MG/3 ML) PNIJ    Inject 2 mg into the skin every 7 days.    TRUE METRIX GLUCOSE METER MISC    USE TO CHECK BLOOD GLUCOSE ONE TIME DAILY    TRUEPLUS LANCETS 30 GAUGE MISC    Use       Review of Systems   Constitutional:  Negative for chills and fever.   Respiratory:  Negative for cough and shortness of breath.    Cardiovascular:  Negative for chest pain.   Gastrointestinal:  Positive for abdominal distention, abdominal pain, constipation and nausea. Negative for change in bowel habit.   Genitourinary:  Negative for dysuria, frequency, urgency and vaginal discharge.   Musculoskeletal:  Positive for arthralgias and gait problem. Negative for back pain.   Neurological:  Negative for dizziness and headaches.      Objective:   /84 (BP Location: Left arm, Patient Position: Sitting)   Pulse 71   Temp 97 °F (36.1 °C) (Temporal)   Resp 18   Ht 4' 9" (1.448 m)   Wt 65.4 kg (144 lb 2.9 oz)   LMP  (LMP Unknown)   SpO2 99%   BMI 31.20 kg/m²     Physical Exam  Vitals reviewed.   Constitutional:       Appearance: Normal appearance.   HENT:      Head: Normocephalic and atraumatic.   Pulmonary:      Effort: Pulmonary effort is normal.   Abdominal:      General: Bowel sounds are normal.      Palpations: Abdomen is soft.      Tenderness: There is abdominal tenderness in the right lower quadrant and left lower quadrant.   Musculoskeletal:      Lumbar back: Negative right straight leg raise test and negative left straight leg raise test.      Right hip: Decreased " range of motion. Normal strength.      Left hip: Tenderness present. Normal range of motion. Decreased strength.   Skin:     General: Skin is warm and dry.   Neurological:      Mental Status: She is alert and oriented to person, place, and time.       Assessment and Plan:     1. Lower abdominal pain (Primary)    Considered bursitis or ligament/tendon issue in hip as potential cause of patient's discomfort  Concerned about possible appendicitis due to tenderness in left lower quadrant and bloating  Ordered CT of abdomen/pelvis to rule out appendicitis before treating as musculoskeletal issue    Appendicitis vs Referred Hip Pain vs Other     - CT Abdomen Pelvis W Wo Contrast; Future (schedule tomorrow as patient needs to complete pre medication for iodine).     Advised should symptoms worsen before CT should present to ER.     2. 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  Dispense: 3 tablet; Refill: 0  - diphenhydrAMINE (BENADRYL) 50 MG capsule; Take 50mg by mouth 1 hour before contrast administration.  Dispense: 1 capsule; Refill: 0            This note was generated with the assistance of ambient listening technology. Verbal consent was obtained by the patient and accompanying visitor(s) for the recording of patient appointment to facilitate this note. I attest to having reviewed and edited the generated note for accuracy, though some syntax or spelling errors may persist. Please contact the author of this note for any clarification.

## 2025-02-06 ENCOUNTER — HOSPITAL ENCOUNTER (OUTPATIENT)
Dept: RADIOLOGY | Facility: HOSPITAL | Age: 60
Discharge: HOME | End: 2025-02-06
Attending: NURSE PRACTITIONER
Payer: COMMERCIAL

## 2025-02-06 ENCOUNTER — PATIENT MESSAGE (OUTPATIENT)
Dept: INTERNAL MEDICINE | Facility: CLINIC | Age: 60
End: 2025-02-06
Payer: COMMERCIAL

## 2025-02-06 DIAGNOSIS — R10.30 LOWER ABDOMINAL PAIN: ICD-10-CM

## 2025-02-06 PROCEDURE — 74178 CT ABD&PLV WO CNTR FLWD CNTR: CPT | Mod: 26,,, | Performed by: RADIOLOGY

## 2025-02-06 PROCEDURE — 74178 CT ABD&PLV WO CNTR FLWD CNTR: CPT | Mod: TC

## 2025-02-06 PROCEDURE — 25500020 PHARM REV CODE 255: Performed by: NURSE PRACTITIONER

## 2025-02-06 RX ADMIN — IOHEXOL 75 ML: 350 INJECTION, SOLUTION INTRAVENOUS at 09:02

## 2025-02-06 RX ADMIN — IOHEXOL 30 ML: 350 INJECTION, SOLUTION INTRAVENOUS at 08:02

## 2025-02-07 ENCOUNTER — TELEPHONE (OUTPATIENT)
Dept: INTERNAL MEDICINE | Facility: CLINIC | Age: 60
End: 2025-02-07

## 2025-02-07 DIAGNOSIS — M25.551 RIGHT HIP PAIN: ICD-10-CM

## 2025-02-07 DIAGNOSIS — R10.30 LOWER ABDOMINAL PAIN: Primary | ICD-10-CM

## 2025-02-07 NOTE — TELEPHONE ENCOUNTER
----- Message from Cami sent at 2/7/2025  1:42 PM CST -----  Contact: Pt [phone]  Calling to get test results.     Name of test (lab, x-ray, etc.):   CT scan     Date of test:  02/06/2025    Would they like to receive a phone call or a response via [facility]?:  call back     Additional information:  Pt has questions about results and would also like to discuss her leg pain

## 2025-02-07 NOTE — TELEPHONE ENCOUNTER
"Spoke to pt who have concerns about CT findings: pulmonary nodules and possible fibroid.   Informed pt report states nodules are likely benign and to follow up with GYN in regards to fibroid evaluation.     As for steroids, pt state it did help with the pain but she is starting to have the same feeling as before with "tenderness and pulling" sensation in lower abdomen.   Pt will try ibuprofen for pain.  "

## 2025-02-10 RX ORDER — MELOXICAM 15 MG/1
15 TABLET ORAL DAILY
Qty: 10 TABLET | Refills: 0 | Status: SHIPPED | OUTPATIENT
Start: 2025-02-10 | End: 2025-02-20

## 2025-02-10 RX ORDER — METHOCARBAMOL 500 MG/1
500 TABLET, FILM COATED ORAL 2 TIMES DAILY
Qty: 20 TABLET | Refills: 0 | Status: SHIPPED | OUTPATIENT
Start: 2025-02-10 | End: 2025-02-20

## 2025-02-10 NOTE — TELEPHONE ENCOUNTER
CT findings indicate pulmonary nodules which are likely benign.   If you have a history of smoking of greater than 20 pack years we could consider dedicated CT chest.      I agree with Huyen -- follow up with GYN regarding possible fibroid.     Additionally you have moderate amounts of plaque in your aorta. Please continue Crestor daily.     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 I am sending over an NSAID and muscle relaxer to be taken.

## 2025-02-27 ENCOUNTER — TELEPHONE (OUTPATIENT)
Dept: PHARMACY | Facility: CLINIC | Age: 60
End: 2025-02-27
Payer: COMMERCIAL

## 2025-02-27 NOTE — TELEPHONE ENCOUNTER
[facility]/Population Health Chart Review & Patient Outreach Details For Medication Adherence Project    Reason for Outreach Encounter: 3rd Party payor non-compliance report ([employer], BCBS, [employer], etc)  2.  Patient Outreach Method: Reviewed patient chart  and [employer] message  3.   Medication in question:    Diabetes Medications              semaglutide (OZEMPIC) 2 mg/dose (8 mg/3 mL) PnIj Inject 2 mg into the skin every 7 days.                 Ozempic  last filled  1/24/25 for 28 day supply      4.  Reviewed and or Updates Made To: Patient Chart  5. Outreach Outcomes and/or actions taken: Sent inquiry to patient: Waiting for response  Additional Notes:

## 2025-03-13 ENCOUNTER — OCCUPATIONAL HEALTH (OUTPATIENT)
Dept: URGENT CARE | Facility: CLINIC | Age: 60
End: 2025-03-13

## 2025-03-13 DIAGNOSIS — Z00.00 ENCOUNTER FOR PHYSICAL EXAMINATION: Primary | ICD-10-CM

## 2025-03-13 LAB — BREATH ALCOHOL: 0

## 2025-03-27 ENCOUNTER — TELEPHONE (OUTPATIENT)
Dept: PHARMACY | Facility: CLINIC | Age: 60
End: 2025-03-27
Payer: COMMERCIAL

## 2025-03-27 NOTE — TELEPHONE ENCOUNTER
[facility]/Population Health Chart Review & Patient Outreach Details For Medication Adherence Project    Reason for Outreach Encounter: 3rd Party payor non-compliance report ([employer], BCBS, UHC, etc)  2.  Patient Outreach Method: Reviewed patient chart   3.   Medication in question:    Diabetes Medications              semaglutide (OZEMPIC) 2 mg/dose (8 mg/3 mL) PnIj Inject 2 mg into the skin every 7 days.                 ozempic  last filled  3/17 for 28 day supply      4.  Reviewed and or Updates Made To: Patient Chart  5. Outreach Outcomes and/or actions taken: Patient filled medication and is on track to be adherent  Additional Notes:

## 2025-04-11 ENCOUNTER — TELEPHONE (OUTPATIENT)
Dept: PHARMACY | Facility: CLINIC | Age: 60
End: 2025-04-11
Payer: COMMERCIAL

## 2025-04-11 NOTE — TELEPHONE ENCOUNTER
[facility]/Population Health Chart Review & Patient Outreach Details For Medication Adherence Project    Reason for Outreach Encounter: 3rd Party payor non-compliance report ([employer], BCBS, UHC, etc)  2.  Patient Outreach Method: Reviewed patient chart   3.   Medication in question:    Diabetes Medications              semaglutide (OZEMPIC) 2 mg/dose (8 mg/3 mL) PnIj Inject 2 mg into the skin every 7 days.                 ozempic  last filled  4/11 for 28 day supply      4.  Reviewed and or Updates Made To: Patient Chart  5. Outreach Outcomes and/or actions taken: Patient filled medication and is on track to be adherent  Additional Notes:

## 2025-05-11 ENCOUNTER — PATIENT MESSAGE (OUTPATIENT)
Dept: INTERNAL MEDICINE | Facility: CLINIC | Age: 60
End: 2025-05-11
Payer: COMMERCIAL

## 2025-05-11 DIAGNOSIS — D22.9 CHANGE IN SKIN MOLE: Primary | ICD-10-CM

## 2025-05-13 NOTE — TELEPHONE ENCOUNTER
A referral order to Dermatology has been entered.  
Message sent to referral coordinators.   
Pt c/o a mole on her neck that is bothering her and would like to see someone about it  Do you want her to see Dermatology? If so, please enter referral    
pain in back with straight leg raise/tenderness

## 2025-05-21 ENCOUNTER — TELEPHONE (OUTPATIENT)
Dept: PHARMACY | Facility: CLINIC | Age: 60
End: 2025-05-21
Payer: COMMERCIAL

## 2025-05-21 NOTE — TELEPHONE ENCOUNTER
[facility]/Population Health Chart Review & Patient Outreach Details For Medication Adherence Project    Reason for Outreach Encounter: 3rd Party payor non-compliance report ([employer], BCBS, C, etc)  2.  Patient Outreach Method: Reviewed patient chart   3.   Medication in question:    Diabetes Medications              semaglutide (OZEMPIC) 2 mg/dose (8 mg/3 mL) PnIj Inject 2 mg into the skin every 7 days.                   last filled  5/9/25 for 28 day supply      4.  Reviewed and or Updates Made To: Patient Chart  5. Outreach Outcomes and/or actions taken: Patient filled medication and is on track to be adherent  Additional Notes:

## 2025-06-19 ENCOUNTER — TELEPHONE (OUTPATIENT)
Dept: PHARMACY | Facility: CLINIC | Age: 60
End: 2025-06-19
Payer: COMMERCIAL

## 2025-06-19 NOTE — TELEPHONE ENCOUNTER
[facility]/Population Health Chart Review & Patient Outreach Details For Medication Adherence Project    Reason for Outreach Encounter: 3rd Party payor non-compliance report ([employer], BCBS, UHC, etc)  2.  Patient Outreach Method: Reviewed patient chart  and [employer] message  3.   Medication in question:    Diabetes Medications              semaglutide (OZEMPIC) 2 mg/dose (8 mg/3 mL) PnIj Inject 2 mg into the skin every 7 days.                 semaglutide  last filled  3/17/25 for 28 day supply      4.  Reviewed and or Updates Made To: Patient Chart  5. Outreach Outcomes and/or actions taken: Sent inquiry to patient: Waiting for response and Patient reminded to  prescription  Additional Notes:

## 2025-07-14 ENCOUNTER — LAB VISIT (OUTPATIENT)
Dept: LAB | Facility: HOSPITAL | Age: 60
End: 2025-07-14
Attending: INTERNAL MEDICINE
Payer: COMMERCIAL

## 2025-07-14 DIAGNOSIS — E78.49 OTHER HYPERLIPIDEMIA: ICD-10-CM

## 2025-07-14 DIAGNOSIS — E11.9 TYPE 2 DIABETES MELLITUS WITHOUT COMPLICATION, WITHOUT LONG-TERM CURRENT USE OF INSULIN: ICD-10-CM

## 2025-07-14 DIAGNOSIS — I25.3 ATRIAL SEPTAL ANEURYSM: ICD-10-CM

## 2025-07-14 LAB
ABSOLUTE EOSINOPHIL (OHS): 0.15 K/UL
ABSOLUTE MONOCYTE (OHS): 0.46 K/UL (ref 0.3–1)
ABSOLUTE NEUTROPHIL COUNT (OHS): 2.78 K/UL (ref 1.8–7.7)
BASOPHILS # BLD AUTO: 0.03 K/UL
BASOPHILS NFR BLD AUTO: 0.6 %
CHOLEST SERPL-MCNC: 267 MG/DL (ref 120–199)
CHOLEST/HDLC SERPL: 4.8 {RATIO} (ref 2–5)
ERYTHROCYTE [DISTWIDTH] IN BLOOD BY AUTOMATED COUNT: 13.6 % (ref 11.5–14.5)
HCT VFR BLD AUTO: 41.5 % (ref 37–48.5)
HDLC SERPL-MCNC: 56 MG/DL (ref 40–75)
HDLC SERPL: 21 % (ref 20–50)
HGB BLD-MCNC: 12.8 GM/DL (ref 12–16)
IMM GRANULOCYTES # BLD AUTO: 0.01 K/UL (ref 0–0.04)
IMM GRANULOCYTES NFR BLD AUTO: 0.2 % (ref 0–0.5)
LDLC SERPL CALC-MCNC: 193.4 MG/DL (ref 63–159)
LYMPHOCYTES # BLD AUTO: 1.46 K/UL (ref 1–4.8)
MCH RBC QN AUTO: 27.9 PG (ref 27–31)
MCHC RBC AUTO-ENTMCNC: 30.8 G/DL (ref 32–36)
MCV RBC AUTO: 90 FL (ref 82–98)
NONHDLC SERPL-MCNC: 211 MG/DL
NUCLEATED RBC (/100WBC) (OHS): 0 /100 WBC
PLATELET # BLD AUTO: 307 K/UL (ref 150–450)
PMV BLD AUTO: 11 FL (ref 9.2–12.9)
RBC # BLD AUTO: 4.59 M/UL (ref 4–5.4)
RELATIVE EOSINOPHIL (OHS): 3.1 %
RELATIVE LYMPHOCYTE (OHS): 29.9 % (ref 18–48)
RELATIVE MONOCYTE (OHS): 9.4 % (ref 4–15)
RELATIVE NEUTROPHIL (OHS): 56.8 % (ref 38–73)
TRIGL SERPL-MCNC: 88 MG/DL (ref 30–150)
WBC # BLD AUTO: 4.89 K/UL (ref 3.9–12.7)

## 2025-07-14 PROCEDURE — 80061 LIPID PANEL: CPT

## 2025-07-14 PROCEDURE — 36415 COLL VENOUS BLD VENIPUNCTURE: CPT | Mod: PO

## 2025-07-14 PROCEDURE — 83036 HEMOGLOBIN GLYCOSYLATED A1C: CPT

## 2025-07-14 PROCEDURE — 85025 COMPLETE CBC W/AUTO DIFF WBC: CPT

## 2025-07-15 LAB
EAG (OHS): 128 MG/DL (ref 68–131)
HBA1C MFR BLD: 6.1 % (ref 4–5.6)

## 2025-07-17 ENCOUNTER — OFFICE VISIT (OUTPATIENT)
Dept: INTERNAL MEDICINE | Facility: CLINIC | Age: 60
End: 2025-07-17
Payer: COMMERCIAL

## 2025-07-17 ENCOUNTER — TELEPHONE (OUTPATIENT)
Dept: INTERNAL MEDICINE | Facility: CLINIC | Age: 60
End: 2025-07-17

## 2025-07-17 VITALS
DIASTOLIC BLOOD PRESSURE: 82 MMHG | OXYGEN SATURATION: 96 % | TEMPERATURE: 97 F | HEIGHT: 57 IN | SYSTOLIC BLOOD PRESSURE: 134 MMHG | HEART RATE: 64 BPM | BODY MASS INDEX: 32.54 KG/M2 | RESPIRATION RATE: 18 BRPM | WEIGHT: 150.81 LBS

## 2025-07-17 DIAGNOSIS — Z23 NEED FOR VACCINATION: ICD-10-CM

## 2025-07-17 DIAGNOSIS — E78.49 OTHER HYPERLIPIDEMIA: ICD-10-CM

## 2025-07-17 DIAGNOSIS — E66.811 CLASS 1 OBESITY WITH SERIOUS COMORBIDITY AND BODY MASS INDEX (BMI) OF 32.0 TO 32.9 IN ADULT, UNSPECIFIED OBESITY TYPE: ICD-10-CM

## 2025-07-17 DIAGNOSIS — T46.6X5A STATIN MYOPATHY: ICD-10-CM

## 2025-07-17 DIAGNOSIS — Z12.31 ENCOUNTER FOR SCREENING MAMMOGRAM FOR BREAST CANCER: ICD-10-CM

## 2025-07-17 DIAGNOSIS — K59.03 DRUG-INDUCED CONSTIPATION: Primary | ICD-10-CM

## 2025-07-17 DIAGNOSIS — E11.9 TYPE 2 DIABETES MELLITUS WITHOUT COMPLICATION, WITHOUT LONG-TERM CURRENT USE OF INSULIN: Chronic | ICD-10-CM

## 2025-07-17 DIAGNOSIS — E11.9 TYPE 2 DIABETES MELLITUS WITHOUT COMPLICATION, WITHOUT LONG-TERM CURRENT USE OF INSULIN: Primary | ICD-10-CM

## 2025-07-17 DIAGNOSIS — G72.0 STATIN MYOPATHY: ICD-10-CM

## 2025-07-17 DIAGNOSIS — F90.9 ATTENTION DEFICIT HYPERACTIVITY DISORDER (ADHD), UNSPECIFIED ADHD TYPE: ICD-10-CM

## 2025-07-17 PROCEDURE — 99999 PR PBB SHADOW E&M-EST. PATIENT-LVL V: CPT | Mod: PBBFAC,,, | Performed by: NURSE PRACTITIONER

## 2025-07-17 RX ORDER — DOCUSATE SODIUM 50 MG/5ML
100 LIQUID ORAL 2 TIMES DAILY
Qty: 473 ML | Refills: 0 | Status: SHIPPED | OUTPATIENT
Start: 2025-07-17

## 2025-07-17 RX ORDER — METHYLPHENIDATE HYDROCHLORIDE 18 MG/1
18 TABLET ORAL EVERY MORNING
Qty: 30 TABLET | Refills: 0 | Status: SHIPPED | OUTPATIENT
Start: 2025-07-17

## 2025-07-18 NOTE — PROGRESS NOTES
Subjective:       Patient ID: Joanne Stallings is a 59 y.o. female.    Chief Complaint: Follow-up (6 month )      History of Present Illness    CHIEF COMPLAINT:  Ms. Stallings presents today for follow up    ADHD:  She has a history of ADHD diagnosed by Dr. Rodriguez and was previously treated with Concerta but had to discontinue ADHD medication to start anxiety medications while caring for a sick mother. She continues to experience ongoing struggles with attention and task completion, describing challenges with focus and feeling overwhelmed by responsibilities. She acknowledges that her inability to complete tasks contributes to feelings of depression and notes she is frequently late and feels rushed. She acknowledges challenges with increased responsibilities and managing tasks as she has gotten older. A referral to psychiatry for ADHD management has been initiated and she has been prescribed Concerta as a 30-day prescription to be used as needed on days when symptoms significantly impact functioning.    DIABETES:  She takes Ozempic (semaglutide) 2 mg weekly with inconsistent adherence, having missed two doses recently. She previously tried Metformin but discontinued due to significant GI side effects. Hemoglobin A1C is slightly elevated to 6.1 from previous December measurement.    HYPERLIPIDEMIA:  She takes rosuvastatin, with dose increased from 20 mg to 40 mg in May of last year. She experienced body aches as a side effect which resolved upon stopping rosuvastatin. She was previously on Lipitor with side effects as well. Cardiology increase Crestor with intentions to add Zetia if no improvement. Given she cannot tolerate statins will defer Zetia and go straight to Repatha.     WEIGHT MANAGEMENT:  She reports weight gain of 6 lbs since February, currently weighing 150 lbs.    CONSTIPATION:  She continues to experience chronic constipation, which has been exacerbated by Ozempic, noting difficulty with bowel  movements despite increasing fiber intake.    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 She manages multiple daily responsibilities including preparing children for school, ensuring homework is completed, cooking, cleaning, doing laundry, packing lunches, and managing bathtime. She reports handling a significant portion of childcare and household management independently.      ROS:  Gastrointestinal: +constipation  Psychiatric: +depression, +anxiety, +increased distractibility, +lack of focus/concentration         Review of patient's allergies indicates:   Allergen Reactions    Iodine Hives and Swelling    Shellfish containing products Hives and Swelling       Medication List with Changes/Refills   New Medications    DOCUSATE (COLACE) 50 MG/5 ML LIQUID    Take 10 mLs (100 mg total) by mouth 2 (two) times daily.    EVOLOCUMAB (REPATHA SURECLICK) 140 MG/ML PNIJ    Inject 1 mL (140 mg total) into the skin every 14 (fourteen) days.    METHYLPHENIDATE HCL 18 MG CR TABLET    Take 1 tablet (18 mg total) by mouth every morning.   Current Medications    ESTRADIOL (ESTRACE) 0.01 % (0.1 MG/GRAM) VAGINAL CREAM    Place 1 gram vaginally nightly x 2 weeks then 1 gram twice weekly    FLUTICASONE PROPIONATE (FLONASE) 50 MCG/ACTUATION NASAL SPRAY    USE 1 SPRAY (50 MCG TOTAL) IN EACH NOSTRIL ONCE DAILY    IBUPROFEN (ADVIL,MOTRIN) 800 MG TABLET    Take 1 tablet (800 mg total) by mouth every 6 (six) hours as needed for Pain.    LEVOCETIRIZINE (XYZAL) 5 MG TABLET    Take 1 tablet (5 mg total) by mouth every evening. for 14 days    SEMAGLUTIDE (OZEMPIC) 2 MG/DOSE (8 MG/3 ML) PNIJ    Inject 2 mg into the skin every 7 days.    TRUE METRIX GLUCOSE METER MISC    USE TO CHECK BLOOD GLUCOSE ONE TIME DAILY    TRUEPLUS LANCETS 30 GAUGE MISC    Use   Discontinued Medications    BLOOD SUGAR DIAGNOSTIC STRP    To check BG once daily, to use with insurance preferred meter    DIPHENHYDRAMINE  "(BENADRYL) 50 MG CAPSULE    Take 50mg by mouth 1 hour before contrast administration.    LANCETS MISC    1 each by Misc.(Non-Drug; Combo Route) route once daily.    PREDNISONE (DELTASONE) 50 MG TAB    Take 50mg by mouth at 13 hours, 7 hours, and 1 hour before contrast administration.    ROSUVASTATIN (CRESTOR) 40 MG TAB    Take 1 tablet (40 mg total) by mouth every evening.     Objective:   /82 (BP Location: Left arm, Patient Position: Sitting)   Pulse 64   Temp 97.1 °F (36.2 °C) (Temporal)   Resp 18   Ht 4' 9" (1.448 m)   Wt 68.4 kg (150 lb 12.7 oz)   LMP  (LMP Unknown)   SpO2 96%   BMI 32.63 kg/m²     Physical Exam  Vitals reviewed.   Constitutional:       Appearance: Normal appearance.   HENT:      Head: Normocephalic and atraumatic.   Pulmonary:      Effort: Pulmonary effort is normal.   Skin:     General: Skin is warm and dry.   Neurological:      Mental Status: She is alert and oriented to person, place, and time.       I reviewed and independently interpreted the labs and imaging below:  Last Labs:  Glucose   Date Value Ref Range Status   12/09/2024 89 70 - 110 mg/dL Final   07/22/2024 102 70 - 110 mg/dL Final     BUN   Date Value Ref Range Status   12/09/2024 17 6 - 20 mg/dL Final   07/22/2024 21 (H) 6 - 20 mg/dL Final     Creatinine   Date Value Ref Range Status   12/09/2024 0.8 0.5 - 1.4 mg/dL Final   07/22/2024 1.0 0.5 - 1.4 mg/dL Final     Cholesterol Total   Date Value Ref Range Status   07/14/2025 267 (H) 120 - 199 mg/dL Final     Comment:     The National Cholesterol Education Program (NCEP) has set the  following guidelines (reference ranges) for Cholesterol:  Optimal.....................<200 mg/dL  Borderline High.............200-239 mg/dL  High........................> or = 240 mg/dL     Cholesterol   Date Value Ref Range Status   12/09/2024 284 (H) 120 - 199 mg/dL Final     Comment:     The National Cholesterol Education Program (NCEP) has set the  following guidelines (reference " ranges) for Cholesterol:  Optimal.....................<200 mg/dL  Borderline High.............200-239 mg/dL  High........................> or = 240 mg/dL     07/22/2024 263 (H) 120 - 199 mg/dL Final     Comment:     The National Cholesterol Education Program (NCEP) has set the  following guidelines (reference ranges) for Cholesterol:  Optimal.....................<200 mg/dL  Borderline High.............200-239 mg/dL  High........................> or = 240 mg/dL       Hemoglobin A1C   Date Value Ref Range Status   12/09/2024 6.0 (H) 4.0 - 5.6 % Final     Comment:     ADA Screening Guidelines:  5.7-6.4%  Consistent with prediabetes  >or=6.5%  Consistent with diabetes    High levels of fetal hemoglobin interfere with the HbA1C  assay. Heterozygous hemoglobin variants (HbS, HgC, etc)do  not significantly interfere with this assay.   However, presence of multiple variants may affect accuracy.     07/22/2024 6.0 (H) 4.0 - 5.6 % Final     Comment:     ADA Screening Guidelines:  5.7-6.4%  Consistent with prediabetes  >or=6.5%  Consistent with diabetes    High levels of fetal hemoglobin interfere with the HbA1C  assay. Heterozygous hemoglobin variants (HbS, HgC, etc)do  not significantly interfere with this assay.   However, presence of multiple variants may affect accuracy.       Hemoglobin A1c   Date Value Ref Range Status   07/14/2025 6.1 (H) 4.0 - 5.6 % Final     Comment:     ADA Screening Guidelines:  5.7-6.4%  Consistent with prediabetes  >=6.5%  Consistent with diabetes    High levels of fetal hemoglobin interfere with the HbA1C  assay. Heterozygous hemoglobin variants (HbS, HgC, etc)do  not significantly interfere with this assay.   However, presence of multiple variants may affect accuracy.     Hemoglobin   Date Value Ref Range Status   07/22/2024 12.2 12.0 - 16.0 g/dL Final   04/15/2024 12.8 12.0 - 16.0 g/dL Final     HGB   Date Value Ref Range Status   07/14/2025 12.8 12.0 - 16.0 gm/dL Final     Hematocrit   Date  Value Ref Range Status   07/22/2024 40.2 37.0 - 48.5 % Final   04/15/2024 40.1 37.0 - 48.5 % Final     HCT   Date Value Ref Range Status   07/14/2025 41.5 37.0 - 48.5 % Final       Assessment and Plan:     Assessment & Plan    HbA1c slightly elevated to 6.1, but overall glycemic control remains good on semaglutide.  Weight gain of 6 lbs, likely due to inconsistent semaglutide administration.  Cholesterol management options given intolerance to statins:  Discussed Zetia (ezetimibe) as an alternative with lower risk of myalgia.  Opted for Repatha (evolocumab) injection every 2 weeks as preferred option.  Addressed chronic constipation related to semaglutide use.  Reviewed ADHD history and management, previously diagnosed and treated by Dr. Rodriguez.  Initiated short-term Concerta prescription while awaiting psychiatric evaluation.    1. Drug-induced constipation (Primary)    Prescribed Colace (docusate sodium) liquid 10 mL twice daily, with instructions to decrease to daily if stools become too soft.  Recommend increasing water intake and implementing exercise routine to improve bowel function.    - docusate (COLACE) 50 mg/5 mL liquid; Take 10 mLs (100 mg total) by mouth 2 (two) times daily.  Dispense: 473 mL; Refill: 0    2. Statin myopathy    - evolocumab (REPATHA SURECLICK) 140 mg/mL PnIj; Inject 1 mL (140 mg total) into the skin every 14 (fourteen) days.  Dispense: 2 mL; Refill: 0    3. Other hyperlipidemia    Educated on potential side effects including injection site reactions and flu-like symptoms.  If Repatha is not approved, will consider Zetia as alternative.  - Ordered lipid panel to be repeated in 3 months.    - evolocumab (REPATHA SURECLICK) 140 mg/mL PnIj; Inject 1 mL (140 mg total) into the skin every 14 (fourteen) days.  Dispense: 2 mL; Refill: 0    4. Type 2 diabetes mellitus without complication, without long-term current use of insulin    Chronic, stable, continue current medication  Discussed  importance of consistent medication adherence for maintaining glycemic control and managing weight.  - Reviewed proper nutrition with Ozempic, emphasizing balanced meals with adequate protein intake.    - evolocumab (REPATHA SURECLICK) 140 mg/mL PnIj; Inject 1 mL (140 mg total) into the skin every 14 (fourteen) days.  Dispense: 2 mL; Refill: 0    5. Attention deficit hyperactivity disorder (ADHD), unspecified ADHD type    Chronic, recently worse.     - Ambulatory referral/consult to Behavioral Health; Future  - methylphenidate HCl 18 MG CR tablet; Take 1 tablet (18 mg total) by mouth every morning.  Dispense: 30 tablet; Refill: 0    6. Class 1 obesity with serious comorbidity and body mass index (BMI) of 32.0 to 32.9 in adult, unspecified obesity type    Chronic, up 6 lbs recently. Resume Ozempic, diet/exercise.   - evolocumab (REPATHA SURECLICK) 140 mg/mL PnIj; Inject 1 mL (140 mg total) into the skin every 14 (fourteen) days.  Dispense: 2 mL; Refill: 0    7. Need for vaccination    - Td (Tenivac) IM vaccine (>/= 8 yo)  - pneumoc 20-kay conj-dip cr(PF) (PREVNAR-20 (PF)) injection Syrg 0.5 mL    8. Encounter for screening mammogram for breast cancer    - Mammo Digital Screening Bilat w/ Dusty (XPD); Future      ## GENERAL INSTRUCTIONS:  - Advised patient to secure medications away from children's reach.  - Follow up in 5-6 months.         This note was generated with the assistance of ambient listening technology. Verbal consent was obtained by the patient and accompanying visitor(s) for the recording of patient appointment to facilitate this note. I attest to having reviewed and edited the generated note for accuracy, though some syntax or spelling errors may persist. Please contact the author of this note for any clarification.

## 2025-07-19 ENCOUNTER — TELEPHONE (OUTPATIENT)
Dept: PHARMACY | Facility: CLINIC | Age: 60
End: 2025-07-19
Payer: COMMERCIAL

## 2025-07-19 NOTE — TELEPHONE ENCOUNTER
[facility]/Population Health Chart Review & Patient Outreach Details For Medication Adherence Project     1.Reason for Outreach Encounter: 3rd Party payor non-compliance report ([employer], [employer], [facility], etc)  2.  Patient Outreach Method: mmCHANNELhart message  3.   Medication in question: Ozempic 2mg/dpse  LAST FILLED: 3/17/25 for 28 day supply    Diabetes Medications              semaglutide (OZEMPIC) 2 mg/dose (8 mg/3 mL) PnIj Inject 2 mg into the skin every 7 days.              4.  Reviewed and or Updates Made To: Patient Chart  5. Outreach Outcomes and/or actions taken: Sent inquiry to patient: Waiting for response.

## 2025-08-19 ENCOUNTER — OFFICE VISIT (OUTPATIENT)
Dept: URGENT CARE | Facility: CLINIC | Age: 60
End: 2025-08-19
Payer: COMMERCIAL

## 2025-08-19 VITALS
HEIGHT: 57 IN | BODY MASS INDEX: 32.36 KG/M2 | SYSTOLIC BLOOD PRESSURE: 152 MMHG | TEMPERATURE: 99 F | HEART RATE: 85 BPM | WEIGHT: 150 LBS | RESPIRATION RATE: 18 BRPM | OXYGEN SATURATION: 96 % | DIASTOLIC BLOOD PRESSURE: 76 MMHG

## 2025-08-19 DIAGNOSIS — R51.9 SINUS HEADACHE: ICD-10-CM

## 2025-08-19 DIAGNOSIS — U07.1 COVID-19 VIRUS INFECTION: Primary | ICD-10-CM

## 2025-08-19 DIAGNOSIS — U07.1 COVID-19 VIRUS DETECTED: ICD-10-CM

## 2025-08-19 LAB
CTP QC/QA: YES
SARS-COV+SARS-COV-2 AG RESP QL IA.RAPID: POSITIVE

## 2025-08-19 PROCEDURE — 87811 SARS-COV-2 COVID19 W/OPTIC: CPT | Mod: QW,S$GLB,, | Performed by: NURSE PRACTITIONER

## 2025-08-19 PROCEDURE — 99214 OFFICE O/P EST MOD 30 MIN: CPT | Mod: S$GLB,,, | Performed by: NURSE PRACTITIONER

## 2025-08-21 DIAGNOSIS — E66.811 CLASS 1 OBESITY WITH SERIOUS COMORBIDITY AND BODY MASS INDEX (BMI) OF 32.0 TO 32.9 IN ADULT, UNSPECIFIED OBESITY TYPE: ICD-10-CM

## 2025-08-21 DIAGNOSIS — G72.0 STATIN MYOPATHY: ICD-10-CM

## 2025-08-21 DIAGNOSIS — T46.6X5A STATIN MYOPATHY: ICD-10-CM

## 2025-08-21 DIAGNOSIS — E78.49 OTHER HYPERLIPIDEMIA: ICD-10-CM

## 2025-08-21 DIAGNOSIS — E11.9 TYPE 2 DIABETES MELLITUS WITHOUT COMPLICATION, WITHOUT LONG-TERM CURRENT USE OF INSULIN: Chronic | ICD-10-CM

## 2025-08-21 RX ORDER — EVOLOCUMAB 140 MG/ML
140 INJECTION, SOLUTION SUBCUTANEOUS
Qty: 2 ML | Refills: 0 | Status: ACTIVE | OUTPATIENT
Start: 2025-08-21 | End: 2025-09-18

## (undated) DEVICE — BLADE SURG CARBON STEEL SZ11

## (undated) DEVICE — GLOVE BIOGEL SKINSENSE PI 7.0

## (undated) DEVICE — SYR 30CC LUER LOCK

## (undated) DEVICE — SEE MEDLINE ITEM 157216

## (undated) DEVICE — TRAY MINOR ORTHO

## (undated) DEVICE — NDL SPINAL 18GX3.5 SPINOCAN

## (undated) DEVICE — ADHESIVE DERMABOND ADVANCED

## (undated) DEVICE — SEE MEDLINE ITEM 157116

## (undated) DEVICE — GAUZE SPONGE 4X4 12PLY

## (undated) DEVICE — TUBE SET INFLOW/OUTFLOW

## (undated) DEVICE — Device

## (undated) DEVICE — DRESSING XEROFORM FOIL PK 1X8

## (undated) DEVICE — SEE MEDLINE ITEM 146313

## (undated) DEVICE — SEE MEDLINE ITEM 157131

## (undated) DEVICE — SEE MEDLINE ITEM 157160

## (undated) DEVICE — PAD ABD 8X10 STERILE

## (undated) DEVICE — NDL HYPO REG 25G X 1 1/2

## (undated) DEVICE — SHEET THYROID W/ISO-BAC

## (undated) DEVICE — CONTAINER SPECIMEN STRL 4OZ

## (undated) DEVICE — SYR B-D DISP CONTROL 10CC100/C

## (undated) DEVICE — SEE MEDLINE ITEM 152529

## (undated) DEVICE — SOL IRR NACL .9% 3000ML

## (undated) DEVICE — SEE MEDLINE ITEM 156955

## (undated) DEVICE — TAPE MEDIPORE 4IN X 2YDS

## (undated) DEVICE — DRAPE PLASTIC U 60X72

## (undated) DEVICE — APPLICATOR CHLORAPREP ORN 26ML

## (undated) DEVICE — COVER OVERHEAD SURG LT BLUE

## (undated) DEVICE — SEE MEDLINE ITEM 152622

## (undated) DEVICE — PACK BASIC

## (undated) DEVICE — SUT VICRYL CTD 2-0 GI 27 SH

## (undated) DEVICE — DRAPE STERI U-SHAPED 47X51IN

## (undated) DEVICE — ELECTRODE REM PLYHSV RETURN 9

## (undated) DEVICE — GLOVE BIOGEL PI MICRO INDIC 7

## (undated) DEVICE — DRESSING N ADH OIL EMUL 3X3

## (undated) DEVICE — GLOVE SURGICAL LATEX SZ 7